# Patient Record
Sex: FEMALE | Race: WHITE | Employment: OTHER | URBAN - METROPOLITAN AREA
[De-identification: names, ages, dates, MRNs, and addresses within clinical notes are randomized per-mention and may not be internally consistent; named-entity substitution may affect disease eponyms.]

---

## 2017-04-04 ENCOUNTER — TELEPHONE (OUTPATIENT)
Dept: DERMATOLOGY | Facility: AMBULATORY SURGERY CENTER | Age: 77
End: 2017-04-04

## 2017-04-04 NOTE — TELEPHONE ENCOUNTER
Patient Appointment Date:   04/13/17  9:30am      Guilherme Gandhi, 78 y.o., female  Is calling for their Mohs Pre-Op Assessment    does not have Hepatitis C or HIV  does confirm site   does ID site. Allergies: Allergies   Allergen Reactions    Norvasc [Amlodipine] Swelling         does not have a Pacemaker  does not have a Defibrillator    does not need antibiotics      is not taking NSAIDs    is taking aspirin (due to stent placement in 06/2012)      is not taking garlic  is not taking ginkgo  is not taking Ginseng  is taking fish oils  is not taking vit E    does not take a blood thinner    is not taking Coumadin/Warfarin         Pre operative assessment questions asked to patient. Patient has a general understanding of the procedure, and has been versed that there will be local anesthesia used in the procedure and that She will be ok to drive themselves to and from the appointment.

## 2017-04-13 ENCOUNTER — OFFICE VISIT (OUTPATIENT)
Dept: DERMATOLOGY | Facility: AMBULATORY SURGERY CENTER | Age: 77
End: 2017-04-13

## 2017-04-13 VITALS
OXYGEN SATURATION: 96 % | DIASTOLIC BLOOD PRESSURE: 71 MMHG | HEART RATE: 58 BPM | TEMPERATURE: 98.7 F | BODY MASS INDEX: 31.89 KG/M2 | RESPIRATION RATE: 18 BRPM | HEIGHT: 63 IN | SYSTOLIC BLOOD PRESSURE: 124 MMHG | WEIGHT: 180 LBS

## 2017-04-13 DIAGNOSIS — C44.219 BASAL CELL CARCINOMA OF HELIX, LEFT: Primary | ICD-10-CM

## 2017-04-13 RX ORDER — LIDOCAINE HYDROCHLORIDE AND EPINEPHRINE 10; 10 MG/ML; UG/ML
3 INJECTION, SOLUTION INFILTRATION; PERINEURAL ONCE
Qty: 1 VIAL | Refills: 0
Start: 2017-04-13 | End: 2017-04-13

## 2017-04-13 RX ORDER — BUPIVACAINE HYDROCHLORIDE AND EPINEPHRINE 2.5; 5 MG/ML; UG/ML
INJECTION, SOLUTION INFILTRATION; PERINEURAL
Qty: 1.5 ML | Refills: 0
Start: 2017-04-13 | End: 2018-01-16 | Stop reason: ALTCHOICE

## 2017-04-13 NOTE — MR AVS SNAPSHOT
Visit Information Date & Time Provider Department Dept. Phone Encounter #  
 4/13/2017  9:30 AM Casey Reynoso MD [place] [id number] [phone] [id number] Your Appointments 6/13/2017  2:30 PM  
ANNUAL with Jesus Manuel Chatterjee MD  
[facility]) Appt Note: per Dr. Ayana Young cp? [address] [facility]  
[phone] [id number] [facility] Upcoming Health Maintenance Date Due DTaP/Tdap/Td series (1 - Tdap) 5/2/1963 FOBT Q 1 YEAR AGE 50-75 5/2/1992 ZOSTER VACCINE AGE 60> 5/2/2002 GLAUCOMA SCREENING Q2Y 5/2/2007 OSTEOPOROSIS SCREENING (DEXA) 5/2/2007 Pneumococcal 65+ Low/Medium Risk (1 of 2 - PCV13) 5/2/2007 MEDICARE YEARLY EXAM 5/2/2007 INFLUENZA AGE 9 TO ADULT 8/1/2016 Allergies as of 4/13/2017  Review Complete On: 4/13/2017 By: Casey Reynoso MD  
  
 Severity Noted Reaction Type Reactions Norvasc [Amlodipine]  12/02/2014    Swelling Current Immunizations  Reviewed on 9/21/2012 Name Date Influenza Vaccine Split 11/21/2011 Not reviewed this visit You Were Diagnosed With   
  
 Codes Comments Basal cell carcinoma of helix, left    -  Primary ICD-10-CM: W39.279 ICD-9-CM: 173.21 Vitals BP Pulse Temp Resp Height(growth percentile) Weight(growth percentile) 124/71 (BP 1 Location: Left arm, BP Patient Position: Sitting) (!) 58 98.7 °F (37.1 °C) (Oral) 18 5' 3\" (1.6 m) 180 lb (81.6 kg) SpO2 BMI OB Status Smoking Status 96% 31.89 kg/m2 Postmenopausal Former Smoker Vitals History BMI and BSA Data Body Mass Index Body Surface Area  
 31.89 kg/m 2 1.9 m 2 Preferred Pharmacy Pharmacy Name Phone [id number], [address] [place] [address] [phone] Your Updated Medication List  
  
   
 This list is accurate as of: 4/13/17  9:48 AM.  Always use your most recent med list.  
  
  
  
  
 aspirin 81 mg chewable tablet Take 1 Tab by mouth daily. b complex vitamins tablet Take 1 Tab by mouth daily. doxazosin 1 mg tablet Commonly known as:  CARDURA TAKE 1 TABLET BY MOUTH EVERY DAY  
  
 FISH OIL 1,000 mg Cap Generic drug:  omega-3 fatty acids-vitamin e Take 1 Cap by mouth two (2) times a day. LIPITOR 20 mg tablet Generic drug:  atorvastatin TAKE 1 TABLET BY MOUTH EVERY DAY  
  
 lisinopril-hydroCHLOROthiazide 20-12.5 mg per tablet Commonly known as:  PRINZIDE, ZESTORETIC  
TAKE 1 TABLET BY MOUTH TWO (2) TIMES A DAY. metoprolol tartrate 25 mg tablet Commonly known as:  LOPRESSOR  
TAKE 1 TABLET BY MOUTH EVERY 12 HOURS  
  
 multivitamin tablet Commonly known as:  ONE A DAY Take 1 Tab by mouth daily. NexIUM 20 mg capsule Generic drug:  esomeprazole Take 22.3 mg by mouth daily. nitroglycerin 0.4 mg SL tablet Commonly known as:  NITROSTAT  
1 tablet by SubLINGual route every five (5) minutes as needed (call 911 if not relieved by 3). VITAMIN D3 2,000 unit Tab Generic drug:  cholecalciferol (vitamin D3) Take  by mouth. ZANTAC 75 75 mg tablet Generic drug:  raNITIdine Take 75 mg by mouth two (2) times a day. * ZOLOFT 25 mg tablet Generic drug:  sertraline Take 25 mg by mouth daily. * ZOLOFT PO Take 50 mg by mouth daily. * Notice: This list has 2 medication(s) that are the same as other medications prescribed for you. Read the directions carefully, and ask your doctor or other care provider to review them with you. Patient Instructions WOUND CARE INSTRUCTIONS 1. Keep the dressing clean and dry and do not remove for 48 hours. 2. Then change the dressing once a day as follows: 
a. Wash hands before and after each dressing change. b. Remove dressing and wash site gently with mild soap and water, rinse, and pat dry. 
c. Apply an ointment (Bacitracin, Polysporin, Neosporin, Petroleum jelly or Aquaphor). d. Apply a non-stick (Telfa) dressing or Band-Aid to cover the wound. Remove pressure bandage, Saturday. You may shower daily at this point, no bandage necessary. Glue will eventually come off within the next 2 weeks. If you still feel rough glue at this point, you may apply vaseline to site daily until fully removed. 3. Watch for: BLEEDING: A small amount of drainage may occur. If bleeding occurs, elevate and rest the surgery site. Apply gauze and steady pressure for 15 minutes. If bleeding continues, call this office. INFECTION: Signs of infection include increased redness, pain, warmth, drainage of pus, and fever. If this occurs, call this office. 4. Special Instructions (follow any that are checked): ·  You have stitches that need to be removed in 0 days ·  Avoid bending at the waist and heavy lifting for two days. ·  Sleep with your head elevated for the next two nights. ·  Rest the surgery site and keep it elevated as much as possible for two days. ·  You may apply an ice-pack for 10-15 minutes every waking hour for the rest of the day. ·  Eat a soft diet and avoid hot food and hot drinks for the rest of the day. ·  Other instructions: Follow up as needed Take Tylenol for pain as needed. Once the site is healed with no remaining bandages or open areas, protect your surgical site and scar from the sun, as this area will be more sensitive. Use a broad spectrum sunscreen SPF 30 or higher daily, and a chemical free product (one containing zinc oxide or titanium dioxide) is a good choice if the area is sensitive. You may begin to gently massage the surgical site in 2-3 weeks, rubbing in a circular motion along the scar.  This can help reduce swelling and thickness of a scar. A scar cream may be used beginnning 1 month after the surgery. If you have any questions or concerns, please call our office Monday through Friday at [phone]. Introducing [facility]! Dear Chula Hays: Thank you for requesting a ENDYMION account. Our records indicate that you already have an active ENDYMION account. You can access your account anytime at [email or web address] [email or web address] Did you know that you can access your hospital and ER discharge instructions at any time in ENDYMION? You can also review all of your test results from your hospital stay or ER visit. Additional Information If you have questions, please visit the Frequently Asked Questions section of the ENDYMION website at [email or web address] Remember, ENDYMION is NOT to be used for urgent needs. For medical emergencies, dial 911. Now available from your iPhone and Android! Please provide this summary of care documentation to your next provider. Your primary care clinician is listed as Tereso Gamble. If you have any questions after today's visit, please call [phone].

## 2017-04-13 NOTE — PROGRESS NOTES
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 Procedure explained with full understanding. Vitals:    04/13/17 0928   BP: 124/71   Pulse: (!) 58   Resp: 18   Temp: 98.7 °F (37.1 °C)   TempSrc: Oral   SpO2: 96%   Weight: 81.6 kg (180 lb)   Height: 5' 3\" (1.6 m)     preoperatively, will continue to monitor. Post-op: Written and verbal post-op wound care instructions given to patient with full understanding of care. Surgical wound bandaged with Vaseline, Telfa, 2x2 gauze, and coverall tape. All questions and concerns addressed. Vitals stable postoperatively.

## 2017-04-13 NOTE — PATIENT INSTRUCTIONS
WOUND CARE INSTRUCTIONS    1. Keep the dressing clean and dry and do not remove for 48 hours. 2. Then change the dressing once a day as follows:  a. Wash hands before and after each dressing change. b. Remove dressing and wash site gently with mild soap and water, rinse, and pat dry.  c. Apply an ointment (Bacitracin, Polysporin, Neosporin, Petroleum jelly or Aquaphor). d. Apply a non-stick (Telfa) dressing or Band-Aid to cover the wound. Remove pressure bandage, Saturday. You may shower daily at this point, no bandage necessary. Glue will eventually come off within the next 2 weeks. If you still feel rough glue at this point, you may apply vaseline to site daily until fully removed. 3. Watch for:  BLEEDING: A small amount of drainage may occur. If bleeding occurs, elevate and rest the surgery site. Apply gauze and steady pressure for 15 minutes. If bleeding continues, call this office. INFECTION: Signs of infection include increased redness, pain, warmth, drainage of pus, and fever. If this occurs, call this office. 4. Special Instructions (follow any that are checked):  · [] You have stitches that need to be removed in 0 days  · [x] Avoid bending at the waist and heavy lifting for two days. · [x] Sleep with your head elevated for the next two nights. · [x] Rest the surgery site and keep it elevated as much as possible for two days. · [x] You may apply an ice-pack for 10-15 minutes every waking hour for the rest of the day. · [] Eat a soft diet and avoid hot food and hot drinks for the rest of the day. · [] Other instructions: Follow up as needed  Take Tylenol for pain as needed. Once the site is healed with no remaining bandages or open areas, protect your surgical site and scar from the sun, as this area will be more sensitive.   Use a broad spectrum sunscreen SPF 30 or higher daily, and a chemical free product (one containing zinc oxide or titanium dioxide) is a good choice if the area is sensitive. You may begin to gently massage the surgical site in 2-3 weeks, rubbing in a circular motion along the scar. This can help reduce swelling and thickness of a scar. A scar cream may be used beginnning 1 month after the surgery. If you have any questions or concerns, please call our office Monday through Friday at [phone].

## 2017-04-13 NOTE — PROGRESS NOTES
This note is written by Ha Polo, as dictated by Miguel A Martinez. Ro Dawn MD.    CC: Basal cell carcinoma on the left superior helical rim    History of present illness:     Bhakti Encarnacion is a 78 y.o. female referred by [facility]. 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 This is a new basal cell carcinoma present for several years described as an asymptomatic, slowly enlarging patch, no prior treatment. 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 She reports tenderness and bleeding while healing from the biopsy. She is feeling well and in her usual state of health today. She has no pain, no current illnesses, no other skin concerns. Her allergies, medications, medical, and social history are reviewed by me today. She reports a family history of skin cancer. Exam:     She is an awake, alert, and oriented 78 y.o. female who appears well and in no distress. There is no preauricular, submandibular, or cervical lymphadenopathy. I examined her face. She has an 8 x 6 mm indistinct pink papule on her left superior helical rim. She confirms location. Assessment/plan:    1. Basal cell carcinoma, left superior helical rim. I discussed the diagnosis of basal cell carcinoma and summarized the pathology report. Mohs surgery is indicated by site, size, and poor definition. The procedure was discussed, verbal and written consent were obtained. I performed the procedure. One stage was required to reach a tumor free plane. The surgical defect was managed with complex repair. There were no complications. She will follow up as needed as the site heals. Indications, risks, and options were discussed with Bhakti Encarnacion preoperatively. Risks including, but not limited to: pain, bleeding, infection, tumor recurrence, scarring and damage to motor and/or sensory nerves, were discussed.  Bhakti Encarnacion chose Mohs surgery. Luisa Estrada was an acceptable surgery candidate. Luisa Estrada was placed in the appropriate position on the operating table in the Mohs surgery procedure room. The area was prepped and draped in the standard manner. Gentian violet was used to outline the clinical margins of the tumor. Local anesthesia was then obtained. The grossly visible tumor was then removed, an underlying layer was excised and mapped according to the Mohs technique, and the individual specimens examined microscopically. The process was repeated until microscopic examination of the tissue specimens confirmed a tumor-free plane. Hemostasis was obtained with electrosurgery and pressure. The wound was covered between stages with moist saline gauze. Wound care instructions (written and verbal) and a follow up appointment were given to Luisa Estrada before discharge. Luisa Estrada was discharged in good condition. The wound management options of second intent healing, layered closure, local flap, and/or full thickness skin graft were discussed. Luisa Estrada understands the aims, risks, alternatives, and possible complications and elects to proceed with a complex layered closure. Wound margins were made vertical, edges undermined in the perichondrial plane, standing cones corrected at both poles followed by layered closure. The wound was closed with buried 6-0 polysorb suture in the muscle and deep subcutis to reduce width of the wound, a second layer in the dermis to reduce tension on the skin edges, and skin edges were approximated with 6-0 fast gut suture. The final closure length was 28 mm. The wound was bandaged with skin glue, Telfa, gauze and Coverroll. Wound care instructions (written and verbal) and a follow up appointment were given to Luisa Estrada before discharge. Lusia Estrada was discharged in good condition. 2. History of skin cancer.  I discussed the diagnosis and recommend routine [facility] for surveillance. The documentation recorded by the scribe accurately reflects the service I personally performed and the decisions made by me. [facility]   OFFICE PROCEDURE PROGRESS NOTE     Chart reviewed for the following:     Hunter Dan MD, have reviewed the History, Physical and updated the Allergic reactions for [address] performed immediately prior to start of procedure:     Hunter Dan MD, have performed the following reviews on Hua Hobbs prior to the start of the procedure:     * Patient was identified by name and date of birth   * Agreement on procedure being performed was verified   * Risks and Benefits explained to the patient   * Procedure site verified and marked as necessary   * Patient was positioned for comfort   * Consent was signed and verified     Time: 9:40 AM  Date of procedure: 4/13/2017  Procedure performed by: Nga Jiang.  Padmini Dan MD   Provider assisted by: LPN   Patient assisted by: self   How tolerated by patient: tolerated the procedure well with no complications   Comments: none

## 2017-05-16 ENCOUNTER — HOSPITAL ENCOUNTER (EMERGENCY)
Age: 77
Discharge: HOME | End: 2017-05-17
Attending: EMERGENCY MEDICINE
Payer: MEDICARE

## 2017-05-16 DIAGNOSIS — R05.9 COUGH: Primary | ICD-10-CM

## 2017-05-16 DIAGNOSIS — J02.9 SORE THROAT: ICD-10-CM

## 2017-05-16 DIAGNOSIS — R68.89 SENSATION OF SWOLLEN THROAT: ICD-10-CM

## 2017-05-16 PROCEDURE — 99283 EMERGENCY DEPT VISIT LOW MDM: CPT

## 2017-05-16 PROCEDURE — 94640 AIRWAY INHALATION TREATMENT: CPT

## 2017-05-16 PROCEDURE — 77030029684 HC NEB SM VOL KT MONA -A

## 2017-05-17 ENCOUNTER — APPOINTMENT (OUTPATIENT)
Dept: GENERAL RADIOLOGY | Age: 77
End: 2017-05-17
Attending: EMERGENCY MEDICINE
Payer: MEDICARE

## 2017-05-17 VITALS
SYSTOLIC BLOOD PRESSURE: 186 MMHG | HEIGHT: 63 IN | BODY MASS INDEX: 32.03 KG/M2 | RESPIRATION RATE: 18 BRPM | TEMPERATURE: 98.7 F | WEIGHT: 180.78 LBS | OXYGEN SATURATION: 96 % | HEART RATE: 92 BPM | DIASTOLIC BLOOD PRESSURE: 95 MMHG

## 2017-05-17 LAB
APPEARANCE UR: ABNORMAL
BACTERIA URNS QL MICRO: NEGATIVE /HPF
BILIRUB UR QL: NEGATIVE
COLOR UR: ABNORMAL
DEPRECATED S PYO AG THROAT QL EIA: NEGATIVE
EPITH CASTS URNS QL MICRO: ABNORMAL /LPF
GLUCOSE UR STRIP.AUTO-MCNC: NEGATIVE MG/DL
HGB UR QL STRIP: NEGATIVE
KETONES UR QL STRIP.AUTO: NEGATIVE MG/DL
LEUKOCYTE ESTERASE UR QL STRIP.AUTO: NEGATIVE
NITRITE UR QL STRIP.AUTO: NEGATIVE
PH UR STRIP: 5.5 [PH] (ref 5–8)
PROT UR STRIP-MCNC: NEGATIVE MG/DL
RBC #/AREA URNS HPF: ABNORMAL /HPF (ref 0–5)
SP GR UR REFRACTOMETRY: 1.02 (ref 1–1.03)
UA: UC IF INDICATED,UAUC: ABNORMAL
UROBILINOGEN UR QL STRIP.AUTO: 0.2 EU/DL (ref 0.2–1)
WBC URNS QL MICRO: ABNORMAL /HPF (ref 0–4)

## 2017-05-17 PROCEDURE — 81001 URINALYSIS AUTO W/SCOPE: CPT | Performed by: EMERGENCY MEDICINE

## 2017-05-17 PROCEDURE — 87880 STREP A ASSAY W/OPTIC: CPT | Performed by: EMERGENCY MEDICINE

## 2017-05-17 PROCEDURE — 70360 X-RAY EXAM OF NECK: CPT

## 2017-05-17 PROCEDURE — 74011000250 HC RX REV CODE- 250: Performed by: EMERGENCY MEDICINE

## 2017-05-17 PROCEDURE — 87070 CULTURE OTHR SPECIMN AEROBIC: CPT | Performed by: EMERGENCY MEDICINE

## 2017-05-17 PROCEDURE — 74011250637 HC RX REV CODE- 250/637: Performed by: EMERGENCY MEDICINE

## 2017-05-17 RX ORDER — PREDNISONE 10 MG/1
TABLET ORAL
Qty: 48 TAB | Refills: 0 | Status: SHIPPED | OUTPATIENT
Start: 2017-05-17 | End: 2017-11-07 | Stop reason: ALTCHOICE

## 2017-05-17 RX ORDER — IPRATROPIUM BROMIDE AND ALBUTEROL SULFATE 2.5; .5 MG/3ML; MG/3ML
SOLUTION RESPIRATORY (INHALATION)
Status: DISCONTINUED
Start: 2017-05-17 | End: 2017-05-17 | Stop reason: HOSPADM

## 2017-05-17 RX ORDER — IPRATROPIUM BROMIDE AND ALBUTEROL SULFATE 2.5; .5 MG/3ML; MG/3ML
3 SOLUTION RESPIRATORY (INHALATION)
Status: COMPLETED | OUTPATIENT
Start: 2017-05-17 | End: 2017-05-17

## 2017-05-17 RX ORDER — CODEINE PHOSPHATE AND GUAIFENESIN 10; 100 MG/5ML; MG/5ML
5 SOLUTION ORAL
Qty: 118 ML | Refills: 0 | Status: SHIPPED | OUTPATIENT
Start: 2017-05-17 | End: 2018-07-19 | Stop reason: ALTCHOICE

## 2017-05-17 RX ORDER — DEXAMETHASONE SODIUM PHOSPHATE 4 MG/ML
10 INJECTION, SOLUTION INTRA-ARTICULAR; INTRALESIONAL; INTRAMUSCULAR; INTRAVENOUS; SOFT TISSUE
Status: COMPLETED | OUTPATIENT
Start: 2017-05-17 | End: 2017-05-17

## 2017-05-17 RX ORDER — LIDOCAINE HYDROCHLORIDE 20 MG/ML
15 SOLUTION OROPHARYNGEAL
Status: COMPLETED | OUTPATIENT
Start: 2017-05-17 | End: 2017-05-17

## 2017-05-17 RX ADMIN — DEXAMETHASONE SODIUM PHOSPHATE 10 MG: 4 INJECTION, SOLUTION INTRAMUSCULAR; INTRAVENOUS at 00:24

## 2017-05-17 RX ADMIN — IPRATROPIUM BROMIDE AND ALBUTEROL SULFATE 3 ML: .5; 3 SOLUTION RESPIRATORY (INHALATION) at 02:44

## 2017-05-17 RX ADMIN — ALUMINUM HYDROXIDE AND MAGNESIUM HYDROXIDE 30 ML: 200; 200 SUSPENSION ORAL at 00:24

## 2017-05-17 RX ADMIN — LIDOCAINE HYDROCHLORIDE 15 ML: 20 SOLUTION ORAL; TOPICAL at 00:24

## 2017-05-17 NOTE — ED NOTES
Provider at bedside for dispo and follow up. Discharge plan reviewed and paperwork given, teaching demonstrated and read back, pain level within manageable comfortable limits, ambulatory to exit, gait steady, safety maintained.

## 2017-05-17 NOTE — DISCHARGE INSTRUCTIONS
Cough: Care Instructions  Your Care Instructions  A cough is your body's response to something that bothers your throat or airways. Many things can cause a cough. You might cough because of a cold or the flu, bronchitis, or asthma. Smoking, postnasal drip, allergies, and stomach acid that backs up into your throat also can cause coughs. A cough is a symptom, not a disease. Most coughs stop when the cause, such as a cold, goes away. You can take a few steps at home to cough less and feel better. Follow-up care is a key part of your treatment and safety. Be sure to make and go to all appointments, and call your doctor if you are having problems. It's also a good idea to know your test results and keep a list of the medicines you take. How can you care for yourself at home? · Drink lots of water and other fluids. This helps thin the mucus and soothes a dry or sore throat. Honey or lemon juice in hot water or tea may ease a dry cough. · Take cough medicine as directed by your doctor. · Prop up your head on pillows to help you breathe and ease a dry cough. · Try cough drops to soothe a dry or sore throat. Cough drops don't stop a cough. Medicine-flavored cough drops are no better than candy-flavored drops or hard candy. · Do not smoke. Avoid secondhand smoke. If you need help quitting, talk to your doctor about stop-smoking programs and medicines. These can increase your chances of quitting for good. When should you call for help? Call 911 anytime you think you may need emergency care. For example, call if:  · You have severe trouble breathing. Call your doctor now or seek immediate medical care if:  · You cough up blood. · You have new or worse trouble breathing. · You have a new or higher fever. · You have a new rash.   Watch closely for changes in your health, and be sure to contact your doctor if:  · You cough more deeply or more often, especially if you notice more mucus or a change in the color of your mucus. · You have new symptoms, such as a sore throat, an earache, or sinus pain. · You do not get better as expected. Where can you learn more? Go to http://anita-keaton.info/. Enter D279 in the search box to learn more about \"Cough: Care Instructions. \"  Current as of: May 27, 2016  Content Version: 11.2  © 2006-2017 [employer]. Care instructions adapted under license by [employer] (which disclaims liability or warranty for this information). If you have questions about a medical condition or this instruction, always ask your healthcare professional. [place] [address] any warranty or liability for your use of this information. Sore Throat: Care Instructions  Your Care Instructions    Infection by bacteria or a virus causes most sore throats. Cigarette smoke, dry air, air pollution, allergies, and yelling can also cause a sore throat. Sore throats can be painful and annoying. Fortunately, most sore throats go away on their own. If you have a bacterial infection, your doctor may prescribe antibiotics. Follow-up care is a key part of your treatment and safety. Be sure to make and go to all appointments, and call your doctor if you are having problems. It's also a good idea to know your test results and keep a list of the medicines you take. How can you care for yourself at home? · If your doctor prescribed antibiotics, take them as directed. Do not stop taking them just because you feel better. You need to take the full course of antibiotics. · Gargle with warm salt water once an hour to help reduce swelling and relieve discomfort. Use 1 teaspoon of salt mixed in 1 cup of warm water. · Take an over-the-counter pain medicine, such as acetaminophen (Tylenol), ibuprofen (Advil, Motrin), or naproxen (Aleve). Read and follow all instructions on the label.   · Be careful when taking over-the-counter cold or flu medicines and Tylenol at the same time. Many of these medicines have acetaminophen, which is Tylenol. Read the labels to make sure that you are not taking more than the recommended dose. Too much acetaminophen (Tylenol) can be harmful. · Drink plenty of fluids. Fluids may help soothe an irritated throat. Hot fluids, such as tea or soup, may help decrease throat pain. · Use over-the-counter throat lozenges to soothe pain. Regular cough drops or hard candy may also help. These should not be given to young children because of the risk of choking. · Do not smoke or allow others to smoke around you. If you need help quitting, talk to your doctor about stop-smoking programs and medicines. These can increase your chances of quitting for good. · Use a vaporizer or humidifier to add moisture to your bedroom. Follow the directions for cleaning the machine. When should you call for help? Call your doctor now or seek immediate medical care if:  · You have new or worse trouble swallowing. · Your sore throat gets much worse on one side. Watch closely for changes in your health, and be sure to contact your doctor if you do not get better as expected. Where can you learn more? Go to http://anita-keaton.info/. Enter 062 441 80 [id number] in the search box to learn more about \"Sore Throat: Care Instructions. \"  Current as of: July 29, 2016  Content Version: 11.2  © 1415-7233 [employer]. Care instructions adapted under license by [employer] (which disclaims liability or warranty for this information). If you have questions about a medical condition or this instruction, always ask your healthcare professional. [place] [address] any warranty or liability for your use of this information. Rapid Strep Test: About This Test  What is it? A rapid strep test checks the bacteria in your throat to see if strep is the cause of your sore throat. Why is this test done?   It may be done so your doctor can find out right away whether you have strep throat. There is another test for strep, called a throat culture, but that test takes a few days to get the results. How can you prepare for the test?  You don't need to do anything before you have this test.  What happens during the test?  · You will be asked to tilt your head back and open your mouth as wide as possible. · Your doctor will press your tongue down with a flat stick (tongue depressor) and then examine your mouth and throat. · A clean cotton swab will be rubbed over the back of your throat, around your tonsils, and over any red areas or sores to collect a sample. How long does the test take? · The test takes less than a minute. · Results are available in 10 to 15 minutes. When should you call for help? Call your doctor now or seek immediate medical care if:  · Your pain gets worse on one side of your throat, or you have trouble opening your mouth. · You have a new or higher fever, or you have a fever with a stiff neck or severe headache. · Swallowing becomes harder, or you have any trouble breathing. · You are sensitive to light or feel very sleepy or confused. Watch closely for changes in your health, and be sure to contact your doctor if:  · You do not start to feel better after 2 days. Follow-up care is a key part of your treatment and safety. Be sure to make and go to all appointments, and call your doctor if you are having problems. It's also a good idea to keep a list of the medicines you take. Ask your doctor when you can expect to have your test results. Where can you learn more? Go to http://anita-keaton.info/. Enter B356 in the search box to learn more about \"Rapid Strep Test: About This Test.\"  Current as of: July 29, 2016  Content Version: 11.2  © 7228-5690 [employer]. Care instructions adapted under license by [employer] (which disclaims liability or warranty for this information).  If you have questions about a medical condition or this instruction, always ask your healthcare professional. [place] [address] any warranty or liability for your use of this information.

## 2017-05-17 NOTE — ED PROVIDER NOTES
HPI Comments: Johs Jones is a 78 y.o. female with PMhx significant for HTN, depression, hypercholesteremia, CAD, and skin CA who presents ambulatory to the ED with cc of a progressively worsening sore throat since 5/15/17. Pt states that since yesterday (5/16) she has also been experiencing progressively worsening productive cough, noting she has coughing fits where she feels short of breath. She denies any hx of similar symptoms. She reports a hx of CAD (s/p stent placement), noting she has a follow up with Dr. Jeff Steinberg (cardiology) next month. She reports daily use of ASA 81 mg. She denies any fevers, nausea, or vomiting. Social Hx: - Tobacco, - EtOH, - Illicit Drugs  PMHx significant for: HTN, depression, hypercholesteremia, CAD, skin CA  Family PMhx is significant for: Skin CA  PSHx significant for: Cardiac cath, stent placement  Cardiology: Dr. Jeff Steinberg  PCP: Michaela Goode MD    There are no other complaints, changes or physical findings at this time. The history is provided by the patient. No  was used. Past Medical History:   Diagnosis Date    CAD (coronary artery disease)     Family history of skin cancer     Hypercholesteremia     Hypertension     Psychiatric disorder     depression    Skin cancer        Past Surgical History:   Procedure Laterality Date    AMB POC MOHS 1 STAGE H/N/HF/G      CARDIAC CATHETERIZATION  5/19/2012         DRUG ELUTING STENT SINGLE VESS  5/19/2012     Dr Alejandra Yuan MOHS PROCEDURES  04/13/2017    BCC left superior helical rim by Dr. Kari Garcia         Family History:   Problem Relation Age of Onset    Coronary Artery Disease Other      daughter stent age 28       Social History     Social History    Marital status:      Spouse name: N/A    Number of children: N/A    Years of education: N/A     Occupational History    Not on file.      Social History Main Topics    Smoking status: Former Smoker     Quit date: 5/25/2012    Smokeless tobacco: Current User    Alcohol use Yes      Comment: Socially.  Drug use: No    Sexual activity: Not on file     Other Topics Concern    Not on file     Social History Narrative         ALLERGIES: Norvasc [amlodipine]    Review of Systems   Constitutional: Negative. Negative for fever. HENT: Positive for sore throat. Eyes: Negative. Respiratory: Positive for cough and shortness of breath. Cardiovascular: Negative for chest pain. Gastrointestinal: Negative for abdominal pain, nausea and vomiting. Endocrine: Negative. Genitourinary: Negative. Negative for difficulty urinating, dysuria and hematuria. Musculoskeletal: Negative. Skin: Negative. Allergic/Immunologic: Negative. Neurological: Negative. Psychiatric/Behavioral: Negative for suicidal ideas. All other systems reviewed and are negative. Vitals:    05/16/17 2355   BP: (!) 178/93   Pulse: 96   Resp: 18   Temp: 98.7 °F (37.1 °C)   SpO2: 95%   Weight: 82 kg (180 lb 12.4 oz)   Height: 5' 3\" (1.6 m)            Physical Exam   Constitutional: She is oriented to person, place, and time. She appears well-developed and well-nourished. No distress. HENT:   Head: Normocephalic and atraumatic. Nose: Nose normal.   Mouth/Throat: Uvula is midline, oropharynx is clear and moist and mucous membranes are normal. No oral lesions. No trismus in the jaw. No uvula swelling. Eyes: Conjunctivae and EOM are normal. No scleral icterus. Neck: Normal range of motion. No tracheal deviation present. Cardiovascular: Normal rate, regular rhythm, normal heart sounds and intact distal pulses. Exam reveals no friction rub. No murmur heard. Pulmonary/Chest: Effort normal and breath sounds normal. No stridor. No respiratory distress. She has no wheezes. She has no rales. Abdominal: Soft. Bowel sounds are normal. She exhibits no distension. There is no tenderness. There is no rebound.    Musculoskeletal: Normal range of motion. She exhibits no tenderness. Neurological: She is alert and oriented to person, place, and time. No cranial nerve deficit. Skin: Skin is warm and dry. No rash noted. She is not diaphoretic. Psychiatric: She has a normal mood and affect. Her speech is normal and behavior is normal. Judgment and thought content normal. Cognition and memory are normal.   Nursing note and vitals reviewed. MDM  Number of Diagnoses or Management Options  Cough:   Sensation of swollen throat:   Sore throat:   Diagnosis management comments: DDX:  Pharyngitis, uti, esophagitis    Plan:  Strep swab, cxr, lidocaine, decadron, ua    Impression:  Uri, cough, sensation of throat swelling       Amount and/or Complexity of Data Reviewed  Clinical lab tests: reviewed and ordered  Tests in the radiology section of CPT®: ordered and reviewed  Review and summarize past medical records: yes    Patient Progress  Patient progress: stable    ED Course       Procedures    I reviewed our electronic medical record system for any past medical records that were available that may contribute to the patients current condition, the nursing notes and and vital signs from today's visit    Nursing notes will be reviewed as they become available in realtime while the pt has been in the ED. Kortney Benitez MD    I personally reviewed pt's imaging. Official read by radiology listed below. Kortney Benitez MD    3:36 AM  Progress note:  Pt noted to be feeling better at this time, Feels ready for discharge. She states that she feels like her breathing has improved and her voice is coming back, although she notes her throat is still sore. She states she would like to be discharged at this time. Discussed lab and imaging findings with pt. Will follow up as instructed. All questions have been answered, pt voiced understanding and agreement with plan.   Specific return precautions provided as well as instructions to return to the ED should sx worsen at any time. Linda Harrington MD    LABORATORY TESTS:  Recent Results (from the past 12 hour(s))   STREP AG SCREEN, GROUP A    Collection Time: 05/17/17 12:12 AM   Result Value Ref Range    Group A Strep Ag ID NEGATIVE  NEG     URINALYSIS W/ REFLEX CULTURE    Collection Time: 05/17/17  2:00 AM   Result Value Ref Range    Color YELLOW/STRAW      Appearance CLOUDY (A) CLEAR      Specific gravity 1.022 1.003 - 1.030      pH (UA) 5.5 5.0 - 8.0      Protein NEGATIVE  NEG mg/dL    Glucose NEGATIVE  NEG mg/dL    Ketone NEGATIVE  NEG mg/dL    Bilirubin NEGATIVE  NEG      Blood NEGATIVE  NEG      Urobilinogen 0.2 0.2 - 1.0 EU/dL    Nitrites NEGATIVE  NEG      Leukocyte Esterase NEGATIVE  NEG      WBC 0-4 0 - 4 /hpf    RBC 0-5 0 - 5 /hpf    Epithelial cells MODERATE (A) FEW /lpf    Bacteria NEGATIVE  NEG /hpf    UA:UC IF INDICATED CULTURE NOT INDICATED BY UA RESULT CNI         IMAGING RESULTS:  XR NECK SOFT TISSUE   Final Result   EXAM: Soft tissue neck series.     CLINICAL INDICATION: Pain     COMPARISON: None.     TECHNIQUE: AP and lateral views of the neck are performed with soft tissue  technique.      FINDINGS: There is normal appearance of the aerodigestive tract with no  abnormality of the hypopharynx, larynx, epiglottis, aryepiglottic folds,  valleculae, or partial trachea evident. There is no prevertebral soft tissue  swelling. No radiographically evident foreign body. Osseous structures and  visualized lung apices appear grossly unremarkable. Atherosclerotic  calcifications of the carotid bifurcations and aortic arch are noted.     IMPRESSION  IMPRESSION: No acute findings. Taty Bourgeois        MEDICATIONS GIVEN:  Medications   lidocaine (XYLOCAINE) 2 % viscous solution 15 mL (15 mL Mouth/Throat Given 5/17/17 0024)   aluminum-magnesium hydroxide (MAALOX) oral suspension 30 mL (30 mL Oral Given 5/17/17 0024)   dexamethasone (DECADRON) 4 mg/mL injection 10 mg (10 mg Oral Given 5/17/17 0024)   albuterol-ipratropium (Dyke Karma) 2.5 MG-0.5 MG/3 ML (3 mL Nebulization Given 5/17/17 0244)       IMPRESSION:  1. Cough    2. Sore throat    3. Sensation of swollen throat        PLAN:  1. Current Discharge Medication List      START taking these medications    Details   guaiFENesin-codeine (CHERATUSSIN AC) 100-10 mg/5 mL solution Take 5 mL by mouth three (3) times daily as needed for Cough. Max Daily Amount: 15 mL. Qty: 118 mL, Refills: 0      predniSONE (STERAPRED DS) 10 mg dose pack Take as directed on packaging  Qty: 48 Tab, Refills: 0           2. Follow-up Information     Follow up With Details Comments [address] Zaid Baumann MD Schedule an appointment as soon as possible for a visit in 2 days  [address] [place] [address]  [phone]          Return to ED if worse     Discharge Note:  3:39 AM  The patient has been re-evaluated and is ready for discharge. Reviewed available results with patient. Counseled patient on diagnosis and care plan. Patient has expressed understanding, and all questions have been answered. Patient agrees with plan and agrees to follow up as recommended, or return to the ED if their symptoms worsen. Discharge instructions have been provided and explained to the patient, along with reasons to return to the ED. Attestation: This note is prepared by James Anderson, acting as Scribe for Josef Woods MD.    Josef Woods MD: The scribe's documentation has been prepared under my direction and personally reviewed by me in its entirety. I confirm that the note above accurately reflects all work, treatment, procedures, and medical decision making performed by me. Patsy Hummel

## 2017-05-19 LAB
BACTERIA SPEC CULT: NORMAL
SERVICE CMNT-IMP: NORMAL

## 2017-07-24 ENCOUNTER — OFFICE VISIT (OUTPATIENT)
Dept: CARDIOLOGY CLINIC | Age: 77
End: 2017-07-24

## 2017-07-24 VITALS
DIASTOLIC BLOOD PRESSURE: 78 MMHG | HEART RATE: 88 BPM | HEIGHT: 63 IN | RESPIRATION RATE: 18 BRPM | BODY MASS INDEX: 31.45 KG/M2 | WEIGHT: 177.5 LBS | SYSTOLIC BLOOD PRESSURE: 122 MMHG | OXYGEN SATURATION: 9 %

## 2017-07-24 DIAGNOSIS — I10 BENIGN ESSENTIAL HYPERTENSION: ICD-10-CM

## 2017-07-24 DIAGNOSIS — E78.2 MIXED HYPERLIPIDEMIA: ICD-10-CM

## 2017-07-24 DIAGNOSIS — I25.10 CORONARY ARTERY DISEASE INVOLVING NATIVE CORONARY ARTERY OF NATIVE HEART WITHOUT ANGINA PECTORIS: Primary | ICD-10-CM

## 2017-07-24 DIAGNOSIS — Z98.61 S/P PTCA (PERCUTANEOUS TRANSLUMINAL CORONARY ANGIOPLASTY): ICD-10-CM

## 2017-07-24 DIAGNOSIS — I44.7 LEFT BUNDLE-BRANCH BLOCK: ICD-10-CM

## 2017-07-24 RX ORDER — LANOLIN ALCOHOL/MO/W.PET/CERES
1000 CREAM (GRAM) TOPICAL DAILY
COMMUNITY

## 2017-07-24 NOTE — MR AVS SNAPSHOT
Visit Information Date & Time Provider Department Dept. Phone Encounter #  
 7/24/2017  1:45 PM Jin Elliott, [address] [place] [address] [facility] [phone] [id number] Upcoming Health Maintenance Date Due DTaP/Tdap/Td series (1 - Tdap) 5/2/1963 ZOSTER VACCINE AGE 60> 3/2/2002 GLAUCOMA SCREENING Q2Y 5/2/2007 OSTEOPOROSIS SCREENING (DEXA) 5/2/2007 Pneumococcal 65+ Low/Medium Risk (1 of 2 - PCV13) 5/2/2007 MEDICARE YEARLY EXAM 5/2/2007 INFLUENZA AGE 9 TO ADULT 8/1/2017 Allergies as of 7/24/2017  Review Complete On: 7/24/2017 By: Jin Elliott MD  
  
 Severity Noted Reaction Type Reactions Norvasc [Amlodipine]  12/02/2014    Swelling Current Immunizations  Reviewed on 9/21/2012 Name Date Influenza Vaccine Split 11/21/2011 Not reviewed this visit You Were Diagnosed With   
  
 Codes Comments Coronary artery disease involving native coronary artery of native heart without angina pectoris    -  Primary ICD-10-CM: I25.10 ICD-9-CM: 414.01 Benign essential hypertension     ICD-10-CM: I10 
ICD-9-CM: 401.1 Left bundle-branch block     ICD-10-CM: I44.7 ICD-9-CM: 426.3 S/P PTCA (percutaneous transluminal coronary angioplasty)     ICD-10-CM: Z98.61 ICD-9-CM: V45.82 Mixed hyperlipidemia     ICD-10-CM: E78.2 ICD-9-CM: 272.2 Vitals BP Pulse Resp Height(growth percentile) Weight(growth percentile) SpO2  
 122/78 (BP 1 Location: Right arm, BP Patient Position: Sitting) 88 18 5' 3\" (1.6 m) 177 lb 8 oz (80.5 kg) (!) 9% BMI OB Status Smoking Status 31.44 kg/m2 Postmenopausal Former Smoker Vitals History BMI and BSA Data Body Mass Index Body Surface Area  
 31.44 kg/m 2 1.89 m 2 Preferred Pharmacy Pharmacy Name Phone [id number], [address] [facility] [phone] Your Updated Medication List  
  
   
 This list is accurate as of: 7/24/17  2:16 PM.  Always use your most recent med list.  
  
  
  
  
 aspirin 81 mg chewable tablet Take 1 Tab by mouth daily. b complex vitamins tablet Take 1 Tab by mouth daily. bupivacaine-EPINEPHrine 0.25 %-1:200,000 Soln Commonly known as:  Benz Savory Used for mohs surgery  
  
 cyanocobalamin 1,000 mcg tablet Take 1,000 mcg by mouth daily. doxazosin 1 mg tablet Commonly known as:  CARDURA TAKE 1 TABLET BY MOUTH EVERY DAY  
  
 FISH OIL 1,000 mg Cap Generic drug:  omega-3 fatty acids-vitamin e Take 1 Cap by mouth two (2) times a day. guaiFENesin-codeine 100-10 mg/5 mL solution Commonly known as:  Vernel Chevy Take 5 mL by mouth three (3) times daily as needed for Cough. Max Daily Amount: 15 mL. LIPITOR 20 mg tablet Generic drug:  atorvastatin TAKE 1 TABLET BY MOUTH EVERY DAY  
  
 lisinopril-hydroCHLOROthiazide 20-12.5 mg per tablet Commonly known as:  PRINZIDE, ZESTORETIC  
TAKE 1 TABLET BY MOUTH TWO (2) TIMES A DAY. metoprolol tartrate 25 mg tablet Commonly known as:  LOPRESSOR  
TAKE 1 TABLET BY MOUTH EVERY 12 HOURS  
  
 multivitamin tablet Commonly known as:  ONE A DAY Take 1 Tab by mouth daily. NexIUM 20 mg capsule Generic drug:  esomeprazole Take 22.3 mg by mouth daily. nitroglycerin 0.4 mg SL tablet Commonly known as:  NITROSTAT  
1 tablet by SubLINGual route every five (5) minutes as needed (call 911 if not relieved by 3). predniSONE 10 mg dose pack Commonly known as:  STERAPRED DS Take as directed on packaging VITAMIN D3 2,000 unit Tab Generic drug:  cholecalciferol (vitamin D3) Take  by mouth. ZANTAC 75 75 mg tablet Generic drug:  raNITIdine Take 75 mg by mouth two (2) times a day. * ZOLOFT 25 mg tablet Generic drug:  sertraline Take 25 mg by mouth daily. * ZOLOFT PO Take 50 mg by mouth daily. * Notice: This list has 2 medication(s) that are the same as other medications prescribed for you. Read the directions carefully, and ask your doctor or other care provider to review them with you. We Performed the Following AMB POC EKG ROUTINE W/ 12 LEADS, INTER & REP [70818 CPT(R)] Introducing [facility] SERVICES! Dear Manson Gitelman: Thank you for requesting a [employer] account. Our records indicate that you already have an active [employer] account. You can access your account anytime at [email or web address] Did you know that you can access your hospital and ER discharge instructions at any time in [employer]? You can also review all of your test results from your hospital stay or ER visit. Additional Information If you have questions, please visit the Frequently Asked Questions section of the [employer] website at [email or web address] Remember, [employer] is NOT to be used for urgent needs. For medical emergencies, dial 911. Now available from your iPhone and Android! Please provide this summary of care documentation to your next provider. Your primary care clinician is listed as Patel Augustine. If you have any questions after today's visit, please call [phone].

## 2017-07-24 NOTE — PROGRESS NOTES
[facility], [address] [facility]  [phone]     Subjective:      Janene Andres is a 78 y.o. female is here for routine f/u. The patient denies chest pain/ shortness of breath, orthopnea, PND, LE edema, palpitations, syncope, or presyncope. Patient Active Problem List    Diagnosis Date Noted    Benign essential hypertension 05/19/2012    Hyperlipidemia 05/19/2012    Depression 05/19/2012    Unstable angina pectoris ([address] 05/19/2012    Left bundle-branch block 05/19/2012    CAD (coronary artery disease) 05/19/2012    S/P PTCA (percutaneous transluminal coronary angioplasty) 05/19/2012      Michelle Lopez MD  Past Medical History:   Diagnosis Date    CAD (coronary artery disease)     Family history of skin cancer     Hypercholesteremia     Hypertension     Psychiatric disorder     depression    Skin cancer       Past Surgical History:   Procedure Laterality Date    AMB POC MOHS 1 STAGE H/N/HF/G      CARDIAC CATHETERIZATION  5/19/2012         DRUG ELUTING STENT SINGLE VESS  5/19/2012     [facility]    BCC left superior helical rim by Dr. Kayla Brennan [Amlodipine] Swelling      Family History   Problem Relation Age of Onset    Coronary Artery Disease Other      daughter stent age 28      Social History     Social History    Marital status:      Spouse name: N/A    Number of children: N/A    Years of education: N/A     Occupational History    Not on file.      Social History Main Topics    Smoking status: Former Smoker     Packs/day: 0.50     Years: 50.00     Quit date: 5/25/2012    Smokeless tobacco: Never Used    Alcohol use Yes      Comment: occasional    Drug use: No    Sexual activity: Not on file     Other Topics Concern    Not on file     Social History Narrative      Current Outpatient Prescriptions   Medication Sig    cyanocobalamin 1,000 mcg tablet Take 1,000 mcg by mouth daily.  LIPITOR 20 mg tablet TAKE 1 TABLET BY MOUTH EVERY DAY    lisinopril-hydroCHLOROthiazide (PRINZIDE, ZESTORETIC) 20-12.5 mg per tablet TAKE 1 TABLET BY MOUTH TWO (2) TIMES A DAY.  doxazosin (CARDURA) 1 mg tablet TAKE 1 TABLET BY MOUTH EVERY DAY    metoprolol tartrate (LOPRESSOR) 25 mg tablet TAKE 1 TABLET BY MOUTH EVERY 12 HOURS    ZOLOFT 25 mg tablet Take 25 mg by mouth daily.  nitroglycerin (NITROSTAT) 0.4 mg SL tablet 1 tablet by SubLINGual route every five (5) minutes as needed (call 911 if not relieved by 3).  omega-3 fatty acids-vitamin e (FISH OIL) 1,000 mg Cap Take 1 Cap by mouth two (2) times a day.  cholecalciferol, vitamin D3, (VITAMIN D3) 2,000 unit Tab Take  by mouth.  aspirin 81 mg chewable tablet Take 1 Tab by mouth daily.  guaiFENesin-codeine (CHERATUSSIN AC) 100-10 mg/5 mL solution Take 5 mL by mouth three (3) times daily as needed for Cough. Max Daily Amount: 15 mL.  predniSONE (STERAPRED DS) 10 mg dose pack Take as directed on packaging    bupivacaine-EPINEPHrine (MARCAINE-EPINEPHRINE) 0.25 %-1:200,000 soln Used for mohs surgery    ranitidine (ZANTAC 75) 75 mg tablet Take 75 mg by mouth two (2) times a day.  b complex vitamins tablet Take 1 Tab by mouth daily.  esomeprazole (NEXIUM) 20 mg capsule Take 22.3 mg by mouth daily.  multivitamin (ONE A DAY) tablet Take 1 Tab by mouth daily.  SERTRALINE HCL (ZOLOFT PO) Take 50 mg by mouth daily. No current facility-administered medications for this visit. Review of Symptoms:  11 systems reviewed, negative other than as stated in the HPI    Physical ExamPhysical Exam:    Vitals:    07/24/17 1331 07/24/17 1342   BP: 122/80 122/78   Pulse: 88    Resp: 18    SpO2: (!) 9%    Weight: 177 lb 8 oz (80.5 kg)    Height: 5' 3\" (1.6 m)      Body mass index is 31.44 kg/(m^2). General PE   Gen:  NAD  Mental Status - Alert. General Appearance - Not in acute distress.    Chest and Lung Exam   Inspection: Accessory muscles - No use of accessory muscles in breathing. Auscultation:   Breath sounds: - Normal.   Cardiovascular   Inspection: Jugular vein - Bilateral - Inspection Normal.   Palpation/Percussion:   Apical Impulse: - Normal.   Auscultation: Rhythm - Regular. Heart Sounds - S1 WNL and S2 WNL. No S3 or S4. Murmurs & Other Heart Sounds: Auscultation of the heart reveals - No Murmurs. Peripheral Vascular   Upper Extremity: Inspection - Bilateral - No Cyanotic nailbeds or Digital clubbing. Lower Extremity:   Palpation: Edema - Bilateral - No edema. Abdomen:   Soft, non-tender, bowel sounds are active.   Neuro: A&O times 3, CN and motor grossly WNL    Labs:   Lab Results   Component Value Date/Time    Cholesterol, total 161 10/16/2014 10:38 AM    Cholesterol, total 168 09/17/2013 12:00 AM    Cholesterol, total 172 01/17/2013 10:41 AM    Cholesterol, total 164 09/05/2012 10:30 AM    Cholesterol, total 131 05/20/2012 03:50 AM    HDL Cholesterol 85 10/16/2014 10:38 AM    HDL Cholesterol 82 09/17/2013 12:00 AM    HDL Cholesterol 84 01/17/2013 10:41 AM    HDL Cholesterol 65 09/05/2012 10:30 AM    HDL Cholesterol 48 05/20/2012 03:50 AM    LDL, calculated 65 10/16/2014 10:38 AM    LDL, calculated 70 09/17/2013 12:00 AM    LDL, calculated 66 01/17/2013 10:41 AM    LDL, calculated 66 09/05/2012 10:30 AM    LDL, calculated 46.8 05/20/2012 03:50 AM    Triglyceride 56 10/16/2014 10:38 AM    Triglyceride 79 09/17/2013 12:00 AM    Triglyceride 111 01/17/2013 10:41 AM    Triglyceride 167 09/05/2012 10:30 AM    Triglyceride 181 05/20/2012 03:50 AM    CHOL/HDL Ratio 2.7 05/20/2012 03:50 AM     Lab Results   Component Value Date/Time    CK 77 04/18/2013 02:41 AM     Lab Results   Component Value Date/Time    Sodium 139 01/02/2015 09:00 AM    Potassium 4.0 01/02/2015 09:00 AM    Chloride 96 01/02/2015 09:00 AM    CO2 26 01/02/2015 09:00 AM    Anion gap 9 09/12/2014 01:25 PM    Glucose 96 01/02/2015 09:00 AM BUN 18 01/02/2015 09:00 AM    Creatinine 0.85 01/02/2015 09:00 AM    BUN/Creatinine ratio 21 01/02/2015 09:00 AM    GFR est AA 79 01/02/2015 09:00 AM    GFR est non-AA 69 01/02/2015 09:00 AM    Calcium 9.4 01/02/2015 09:00 AM    Bilirubin, total 0.5 10/16/2014 10:38 AM    AST (SGOT) 24 10/16/2014 10:38 AM    Alk. phosphatase 99 10/16/2014 10:38 AM    Protein, total 6.5 10/16/2014 10:38 AM    Albumin 4.4 10/16/2014 10:38 AM    Globulin 3.7 09/12/2014 01:25 PM    A-G Ratio 2.1 10/16/2014 10:38 AM    ALT (SGPT) 11 10/16/2014 10:38 AM       EKG:  NSR      Assessment       1. Coronary artery disease involving native coronary artery of native heart without angina pectoris    2. Benign essential hypertension    3. Left bundle-branch block    4. S/P PTCA (percutaneous transluminal coronary angioplasty)    5. Mixed hyperlipidemia        Orders Placed This Encounter    AMB POC EKG ROUTINE W/ 12 LEADS, INTER & REP     Order Specific Question:   Reason for Exam:     Answer:   routine    cyanocobalamin 1,000 mcg tablet     Sig: Take 1,000 mcg by mouth daily. Plan:     Doing well with no cardiac symptoms. CAD:  Stable. Promus element CLEMENTE to the proximal LAD May 19, 2012. Negative stress test in 2015. Continue aspirin, beta-blocker, and statin. Lipids and labs followed by PCP, scanned and at goal 12/2016. HTN controlled    Preventive:  Counseled on diet and exercise- eventual goal of 30-60 minutes 5-7 times a week as per AHA guidelines. She previously goes out every day and does something in a climate-controlled environment such as walking around in a store for a long period of time. Encourage more of the same. Continue current care and f/u in 12 months.     Stacia Jones MD

## 2017-07-31 RX ORDER — DOXAZOSIN 1 MG/1
TABLET ORAL
Qty: 90 TAB | Refills: 2 | Status: SHIPPED | OUTPATIENT
Start: 2017-07-31 | End: 2018-05-01 | Stop reason: SDUPTHER

## 2017-09-12 RX ORDER — METOPROLOL TARTRATE 25 MG/1
TABLET, FILM COATED ORAL
Qty: 180 TAB | Refills: 2 | Status: SHIPPED | OUTPATIENT
Start: 2017-09-12 | End: 2017-10-17 | Stop reason: SDUPTHER

## 2017-10-04 ENCOUNTER — TELEPHONE (OUTPATIENT)
Dept: CARDIOLOGY CLINIC | Age: 77
End: 2017-10-04

## 2017-10-04 NOTE — TELEPHONE ENCOUNTER
Please call patient regarding symptoms, irregular heart rate and high blood pressure, and short winded. .. She keeps feeling like she wants to take a deep breathe. Thanks.

## 2017-10-04 NOTE — TELEPHONE ENCOUNTER
If palpitations are occurring every day, she would benefit from a 24-hour Holter monitor. If more severe palpitations occurring greater than 48 hours apart, she should get an event monitor. Follow-up to be determined after that.

## 2017-10-04 NOTE — TELEPHONE ENCOUNTER
Patient C/O palpitations off and on since weekend with fatigue her BP this am 176/102 pulse 68 current weight 178#  BP this evening 131/86 pulse 61 no distress at this time please advise thanks

## 2017-10-05 ENCOUNTER — CLINICAL SUPPORT (OUTPATIENT)
Dept: CARDIOLOGY CLINIC | Age: 77
End: 2017-10-05

## 2017-10-05 DIAGNOSIS — R00.2 PALPITATIONS: Primary | ICD-10-CM

## 2017-10-12 ENCOUNTER — TELEPHONE (OUTPATIENT)
Dept: CARDIOLOGY CLINIC | Age: 77
End: 2017-10-12

## 2017-10-12 PROBLEM — I48.0 PAF (PAROXYSMAL ATRIAL FIBRILLATION) (HCC): Status: ACTIVE | Noted: 2017-10-12

## 2017-10-12 NOTE — TELEPHONE ENCOUNTER
----- Message from Tamika Cardoza MD sent at 10/12/2017 12:20 PM EDT -----  Bonnie Jaqui- a few short runs of PAF- please f/u with me or an NP in the near future to discuss.

## 2017-10-12 NOTE — TELEPHONE ENCOUNTER
Verified patient with two identifiers. Pt informed of monitor results. Appt made to see Abhishek Squires DNP to discuss. Pt verbalized understanding.

## 2017-10-17 ENCOUNTER — OFFICE VISIT (OUTPATIENT)
Dept: CARDIOLOGY CLINIC | Age: 77
End: 2017-10-17

## 2017-10-17 VITALS
HEIGHT: 63 IN | RESPIRATION RATE: 20 BRPM | WEIGHT: 180.5 LBS | OXYGEN SATURATION: 96 % | SYSTOLIC BLOOD PRESSURE: 124 MMHG | DIASTOLIC BLOOD PRESSURE: 68 MMHG | BODY MASS INDEX: 31.98 KG/M2 | HEART RATE: 62 BPM

## 2017-10-17 DIAGNOSIS — I10 BENIGN ESSENTIAL HYPERTENSION: ICD-10-CM

## 2017-10-17 DIAGNOSIS — I25.10 CORONARY ARTERY DISEASE INVOLVING NATIVE CORONARY ARTERY OF NATIVE HEART WITHOUT ANGINA PECTORIS: ICD-10-CM

## 2017-10-17 DIAGNOSIS — I48.0 PAF (PAROXYSMAL ATRIAL FIBRILLATION) (HCC): Primary | ICD-10-CM

## 2017-10-17 DIAGNOSIS — Z98.61 S/P PTCA (PERCUTANEOUS TRANSLUMINAL CORONARY ANGIOPLASTY): ICD-10-CM

## 2017-10-17 DIAGNOSIS — I44.7 LEFT BUNDLE-BRANCH BLOCK: ICD-10-CM

## 2017-10-17 RX ORDER — METOPROLOL TARTRATE 50 MG/1
50 TABLET ORAL 2 TIMES DAILY
Qty: 60 TAB | Refills: 6 | Status: SHIPPED | OUTPATIENT
Start: 2017-10-17 | End: 2017-11-07 | Stop reason: ALTCHOICE

## 2017-10-17 RX ORDER — LISINOPRIL AND HYDROCHLOROTHIAZIDE 12.5; 2 MG/1; MG/1
1 TABLET ORAL DAILY
Qty: 180 TAB | Refills: 3 | Status: SHIPPED | OUTPATIENT
Start: 2017-10-17 | End: 2017-11-07 | Stop reason: SDUPTHER

## 2017-10-17 NOTE — PROGRESS NOTES
Dash Martínez DNP, ANP-BC  Subjective/HPI:     Yvette Henry is a 78 y.o. female is here for Holter monitor follow-up noting episodes of paroxysmal atrial fibrillation with associated symptoms. Initially Ms. Katerina Degroot had reported 3 days of fluttering and palpitations which started after 1/4 mile walk. She has maintained aspirin as well as beta-blocker therapy at present time she continues to feel an occasional palpitation but the frequency and intensity has steadily been decreasing. Results:   Underlying Rhythm: Normal sinus rhythm      Atrial Arrhythmias: premature atrial contractions; rare and 4 short runs of symptomatic paroxysmal atrial fibrillation with RVR    AV Conduction: bundle branch block    Ventricular Arrhythmias: premature ventricular contractions; rare     ST Segment Analysis:non-specific changes     Symptom Correlation:  Palpitations correspond to PAF with RVR. PCP Provider  Ubaldo Taylor MD  Past Medical History:   Diagnosis Date    CAD (coronary artery disease)     Family history of skin cancer     Hypercholesteremia     Hypertension     Psychiatric disorder     depression    Skin cancer       Past Surgical History:   Procedure Laterality Date    AMB POC MOHS 1 STAGE H/N/HF/G      CARDIAC CATHETERIZATION  5/19/2012         DRUG ELUTING STENT SINGLE VESS  5/19/2012     Dr Michelle Ramon   [place]  04/13/2017    BCC left superior helical rim by Dr. Kinga Wellington [Amlodipine] Swelling      Family History   Problem Relation Age of Onset    Coronary Artery Disease Other      daughter stent age 28      Current Outpatient Prescriptions   Medication Sig    metoprolol tartrate (LOPRESSOR) 50 mg tablet Take 1 Tab by mouth two (2) times a day.  lisinopril-hydroCHLOROthiazide (PRINZIDE, ZESTORETIC) 20-12.5 mg per tablet Take 1 Tab by mouth daily.  apixaban (ELIQUIS) 5 mg tablet Take 1 Tab by mouth two (2) times a day.     doxazosin (CARDURA) 1 mg tablet TAKE 1 TABLET BY MOUTH EVERY DAY    cyanocobalamin 1,000 mcg tablet Take 1,000 mcg by mouth daily.  LIPITOR 20 mg tablet TAKE 1 TABLET BY MOUTH EVERY DAY    ZOLOFT 25 mg tablet Take 25 mg by mouth daily.  ranitidine (ZANTAC 75) 75 mg tablet Take 75 mg by mouth as needed.  b complex vitamins tablet Take 1 Tab by mouth daily.  esomeprazole (NEXIUM) 20 mg capsule Take 22.3 mg by mouth as needed.  nitroglycerin (NITROSTAT) 0.4 mg SL tablet 1 tablet by SubLINGual route every five (5) minutes as needed (call 911 if not relieved by 3).  omega-3 fatty acids-vitamin e (FISH OIL) 1,000 mg Cap Take 1 Cap by mouth two (2) times a day.  cholecalciferol, vitamin D3, (VITAMIN D3) 2,000 unit Tab Take  by mouth.  aspirin 81 mg chewable tablet Take 1 Tab by mouth daily.  guaiFENesin-codeine (CHERATUSSIN AC) 100-10 mg/5 mL solution Take 5 mL by mouth three (3) times daily as needed for Cough. Max Daily Amount: 15 mL.  predniSONE (STERAPRED DS) 10 mg dose pack Take as directed on packaging    bupivacaine-EPINEPHrine (MARCAINE-EPINEPHRINE) 0.25 %-1:200,000 soln Used for mohs surgery    multivitamin (ONE A DAY) tablet Take 1 Tab by mouth daily. No current facility-administered medications for this visit. Vitals:    10/17/17 1405 10/17/17 1415   BP: 122/70 124/68   Pulse: 62    Resp: 20    SpO2: 96%    Weight: 180 lb 8 oz (81.9 kg)    Height: 5' 3\" (1.6 m)      Social History     Social History    Marital status:      Spouse name: N/A    Number of children: N/A    Years of education: N/A     Occupational History    Not on file.      Social History Main Topics    Smoking status: Former Smoker     Packs/day: 0.50     Years: 50.00     Quit date: 5/25/2012    Smokeless tobacco: Never Used    Alcohol use Yes      Comment: occasional    Drug use: No    Sexual activity: Not on file     Other Topics Concern    Not on file     Social History Narrative I have reviewed the nurses notes, vitals, problem list, allergy list, medical history, family, social history and medications. Review of Symptoms: At present time today    General: Pt denies excessive weight gain or loss. Pt is able to conduct ADL's  HEENT: Denies blurred vision, headaches, epistaxis and difficulty swallowing. Respiratory: Denies shortness of breath, REYNA, wheezing or stridor. Cardiovascular: Denies precordial pain, palpitations, edema or PND  Gastrointestinal: Denies poor appetite, indigestion, abdominal pain or blood in stool  Musculoskeletal: Denies pain or swelling from muscles or joints  Neurologic: Denies tremor, paresthesias, or sensory motor disturbance  Skin: Denies rash, itching or texture change. Physical Exam:      General: Well developed, in no acute distress, cooperative and alert  HEENT: No carotid bruits, no JVD, trach is midline. Neck Supple, PEERL, EOM intact. Heart:  Normal S1/S2 negative S3 or S4. Regular, no murmur, gallop or rub.   Respiratory: Clear bilaterally x 4, no wheezing or rales  Abdomen:   Soft, non-tender, no masses, bowel sounds are active.   Extremities:  No edema, normal cap refill, no cyanosis, atraumatic. Neuro: A&Ox3, speech clear, gait stable. Skin: Skin color is normal. No rashes or lesions.  Non diaphoretic  Vascular: 2+ pulses symmetric in all extremities    Cardiographics    ECG: Sinus rhythm with left bundle branch block   Results for orders placed or performed in visit on 10/05/17   CARDIAC HOLTER MONITOR, 24 HOURS    Narrative    ECG Monitor/24 hours, Complete    Reason for Holter Monitor   PALPITATIONS    Heartbeat    Slowest 48  Average 60  Fastest  91      Results:   Underlying Rhythm: Normal sinus rhythm      Atrial Arrhythmias: premature atrial contractions; rare and 4 short runs of symptomatic paroxysmal atrial fibrillation with RVR    AV Conduction: bundle branch block    Ventricular Arrhythmias: premature ventricular contractions; rare     ST Segment Analysis:non-specific changes     Symptom Correlation:  Palpitations correspond to PAF with RVR. Devon Hyde MD      Results for orders placed or performed during the hospital encounter of 09/12/14   EKG, 12 LEAD, INITIAL   Result Value Ref Range    Ventricular Rate 60 BPM    Atrial Rate 60 BPM    P-R Interval 172 ms    QRS Duration 146 ms    Q-T Interval 470 ms    QTC Calculation (Bezet) 470 ms    Calculated P Axis 12 degrees    Calculated R Axis -7 degrees    Calculated T Axis 57 degrees    Diagnosis       Normal sinus rhythm  Left bundle branch block  When compared with ECG of 18-APR-2013 02:38,  No significant change was found  Confirmed by Karey Beyer ([id number]) on 9/12/2014 1:39:12 PM         Cardiology Labs:  Lab Results   Component Value Date/Time    Cholesterol, total 161 10/16/2014 10:38 AM    HDL Cholesterol 85 10/16/2014 10:38 AM    LDL, calculated 65 10/16/2014 10:38 AM    Triglyceride 56 10/16/2014 10:38 AM    CHOL/HDL Ratio 2.7 05/20/2012 03:50 AM       Lab Results   Component Value Date/Time    Sodium 139 01/02/2015 09:00 AM    Potassium 4.0 01/02/2015 09:00 AM    Chloride 96 01/02/2015 09:00 AM    CO2 26 01/02/2015 09:00 AM    Anion gap 9 09/12/2014 01:25 PM    Glucose 96 01/02/2015 09:00 AM    BUN 18 01/02/2015 09:00 AM    Creatinine 0.85 01/02/2015 09:00 AM    BUN/Creatinine ratio 21 01/02/2015 09:00 AM    GFR est AA 79 01/02/2015 09:00 AM    GFR est non-AA 69 01/02/2015 09:00 AM    Calcium 9.4 01/02/2015 09:00 AM    Bilirubin, total 0.5 10/16/2014 10:38 AM    AST (SGOT) 24 10/16/2014 10:38 AM    Alk. phosphatase 99 10/16/2014 10:38 AM    Protein, total 6.5 10/16/2014 10:38 AM    Albumin 4.4 10/16/2014 10:38 AM    Globulin 3.7 09/12/2014 01:25 PM    A-G Ratio 2.1 10/16/2014 10:38 AM    ALT (SGPT) 11 10/16/2014 10:38 AM           Assessment:     Assessment:     Diagnoses and all orders for this visit:    1.  [facility]ox[facility]  - AMB POC EKG ROUTINE W/ 12 LEADS, INTER & REP  -     METABOLIC PANEL, COMPREHENSIVE  -     THYROID PANEL W/TSH  -     MAGNESIUM    2. Benign essential hypertension    3. Coronary artery disease involving native coronary artery of native heart without angina pectoris    4. S/P PTCA (percutaneous transluminal coronary angioplasty)    5. Left bundle-branch block    Other orders  -     metoprolol tartrate (LOPRESSOR) 50 mg tablet; Take 1 Tab by mouth two (2) times a day. -     lisinopril-hydroCHLOROthiazide (PRINZIDE, ZESTORETIC) 20-12.5 mg per tablet; Take 1 Tab by mouth daily. -     apixaban (ELIQUIS) 5 mg tablet; Take 1 Tab by mouth two (2) times a day. ICD-10-CM ICD-9-CM    1. PAF (paroxysmal atrial fibrillation) ([facility]) I48.0 427.31 AMB POC EKG ROUTINE W/ 12 LEADS, INTER & REP      METABOLIC PANEL, COMPREHENSIVE      THYROID PANEL W/TSH      MAGNESIUM   2. Benign essential hypertension I10 401.1    3. Coronary artery disease involving native coronary artery of native heart without angina pectoris I25.10 414.01    4. S/P PTCA (percutaneous transluminal coronary angioplasty) Z98.61 V45.82    5. Left bundle-branch block I44.7 426.3      Orders Placed This Encounter    METABOLIC PANEL, COMPREHENSIVE    THYROID PANEL W/TSH    MAGNESIUM    AMB POC EKG ROUTINE W/ 12 LEADS, INTER & REP     Order Specific Question:   Reason for Exam:     Answer:   Lorrie Quintanilla    metoprolol tartrate (LOPRESSOR) 50 mg tablet     Sig: Take 1 Tab by mouth two (2) times a day. Dispense:  60 Tab     Refill:  6    lisinopril-hydroCHLOROthiazide (PRINZIDE, ZESTORETIC) 20-12.5 mg per tablet     Sig: Take 1 Tab by mouth daily. Dispense:  180 Tab     Refill:  3    apixaban (ELIQUIS) 5 mg tablet     Sig: Take 1 Tab by mouth two (2) times a day. Dispense:  60 Tab     Refill:  3        Plan:     Patient is a 74-year-old female with newly diagnosed atrial fibrillation on Holter monitor coinciding with her symptoms.   Presenting in normal sinus rhythm today with chronic left bundle branch block I will increase beta-blocker Lopressor 50 mg twice a day, reduce lisinopril hydrochlorothiazide to reduce the chances of hypotension. Chads vas 2 score is 4. Will start patient on Eliquis 5 mg twice a day, checking labs to include metabolic panel thyroid magnesium.  ECHO  Follow-up in 1 month    Nan Kay NP

## 2017-10-17 NOTE — MR AVS SNAPSHOT
Visit Information Date & Time Provider Department Dept. Phone Encounter #  
 10/17/2017  2:15 PM Larence Heimlich, NP [facility] [phone] Your Appointments 12/26/2017  8:30 AM  
SURGERY with MD Ramez Swann [address] [place] [address]) Appt Note: Est  BCC  LT nose Dr. Claudell Fells [facility] [address] Lopez[address] [facility] [address]  
[address] [place] [address] [address] [facility] [address] Upcoming Health Maintenance Date Due DTaP/Tdap/Td series (1 - Tdap) 5/2/1963 ZOSTER VACCINE AGE 60> 3/2/2002 GLAUCOMA SCREENING Q2Y 5/2/2007 OSTEOPOROSIS SCREENING (DEXA) 5/2/2007 Pneumococcal 65+ Low/Medium Risk (1 of 2 - PCV13) 5/2/2007 MEDICARE YEARLY EXAM 5/2/2007 INFLUENZA AGE 9 TO ADULT 8/1/2017 Allergies as of 10/17/2017  Review Complete On: 10/17/2017 By: Richie Neither, LPN Severity Noted Reaction Type Reactions Norvasc [Amlodipine]  12/02/2014    Swelling Current Immunizations  Reviewed on 9/21/2012 Name Date Influenza Vaccine Split 11/21/2011 Not reviewed this visit You Were Diagnosed With   
  
 Codes Comments PAF (paroxysmal atrial fibrillation) ([facility]    -  Primary ICD-10-CM: I48.0 ICD-9-CM: 427.31 Vitals BP Pulse Resp Height(growth percentile) Weight(growth percentile) SpO2  
 124/68 (BP 1 Location: Right arm, BP Patient Position: Sitting) 62 20 5' 3\" (1.6 m) 180 lb 8 oz (81.9 kg) 96% BMI OB Status Smoking Status 31.97 kg/m2 Postmenopausal Former Smoker Vitals History BMI and BSA Data Body Mass Index Body Surface Area  
 31.97 kg/m 2 1.91 m 2 Preferred Pharmacy Pharmacy Name Phone [id number] [address] [phone] Your Updated Medication List  
  
   
 This list is accurate as of: 10/17/17  2:59 PM.  Always use your most recent med list.  
  
  
  
  
 apixaban 5 mg tablet Commonly known as:  Ren Fraser Take 1 Tab by mouth two (2) times a day. aspirin 81 mg chewable tablet Take 1 Tab by mouth daily. b complex vitamins tablet Take 1 Tab by mouth daily. bupivacaine-EPINEPHrine 0.25 %-1:200,000 Soln Commonly known as:  Juan Bryson Used for mohs surgery  
  
 cyanocobalamin 1,000 mcg tablet Take 1,000 mcg by mouth daily. doxazosin 1 mg tablet Commonly known as:  CARDURA TAKE 1 TABLET BY MOUTH EVERY DAY  
  
 FISH OIL 1,000 mg Cap Generic drug:  omega-3 fatty acids-vitamin e Take 1 Cap by mouth two (2) times a day. guaiFENesin-codeine 100-10 mg/5 mL solution Commonly known as:  [place] Sow Take 5 mL by mouth three (3) times daily as needed for Cough. Max Daily Amount: 15 mL. LIPITOR 20 mg tablet Generic drug:  atorvastatin TAKE 1 TABLET BY MOUTH EVERY DAY  
  
 lisinopril-hydroCHLOROthiazide 20-12.5 mg per tablet Commonly known as:  Helyn Blinks Take 1 Tab by mouth daily. metoprolol tartrate 50 mg tablet Commonly known as:  LOPRESSOR Take 1 Tab by mouth two (2) times a day. multivitamin tablet Commonly known as:  ONE A DAY Take 1 Tab by mouth daily. NexIUM 20 mg capsule Generic drug:  esomeprazole Take 22.3 mg by mouth as needed. nitroglycerin 0.4 mg SL tablet Commonly known as:  NITROSTAT  
1 tablet by SubLINGual route every five (5) minutes as needed (call 911 if not relieved by 3). predniSONE 10 mg dose pack Commonly known as:  STERAPRED DS Take as directed on packaging VITAMIN D3 2,000 unit Tab Generic drug:  cholecalciferol (vitamin D3) Take  by mouth. ZANTAC 75 75 mg tablet Generic drug:  raNITIdine Take 75 mg by mouth as needed. ZOLOFT 25 mg tablet Generic drug:  sertraline Take 25 mg by mouth daily. Prescriptions Sent to Pharmacy Refills  
 metoprolol tartrate (LOPRESSOR) 50 mg tablet 6 Sig: Take 1 Tab by mouth two (2) times a day. Class: Normal  
 [facility] Ph #: [phone] Route: Oral  
 lisinopril-hydroCHLOROthiazide (PRINZIDE, ZESTORETIC) 20-12.5 mg per tablet 3 Sig: Take 1 Tab by mouth daily. Class: Normal  
 Pharmacy: [facility]/pharmacy #[id number] Ph #: [phone] Route: Oral  
 apixaban (ELIQUIS) 5 mg tablet 3 Sig: Take 1 Tab by mouth two (2) times a day. Class: Normal  
 [id number] Ph #: [phone] Route: Oral  
  
We Performed the Following AMB POC EKG ROUTINE W/ 12 LEADS, INTER & REP [11820 CPT(R)] MAGNESIUM [id number] CPT(R)] METABOLIC PANEL, COMPREHENSIVE [83577 CPT(R)] THYROID PANEL W/TSH [55005 CPT(R)] Introducing [facility]! Dear Nayely Higgins: Thank you for requesting a CastleOS account. Our records indicate that you already have an active CastleOS account. You can access your account anytime at [email or web address] Did you know that you can access your hospital and ER discharge instructions at any time in CastleOS? You can also review all of your test results from your hospital stay or ER visit. Additional Information If you have questions, please visit the Frequently Asked Questions section of the CastleOS website at [email or web address] Remember, CastleOS is NOT to be used for urgent needs. For medical emergencies, dial 911. Now available from your iPhone and Android! Please provide this summary of care documentation to your next provider. Your primary care clinician is listed as Emerson Huff.  If you have any questions after today's visit, please call [phone].

## 2017-10-18 ENCOUNTER — TELEPHONE (OUTPATIENT)
Dept: CARDIOLOGY CLINIC | Age: 77
End: 2017-10-18

## 2017-10-18 LAB
ALBUMIN SERPL-MCNC: 4.3 G/DL (ref 3.5–4.8)
ALBUMIN/GLOB SERPL: 1.7 {RATIO} (ref 1.2–2.2)
ALP SERPL-CCNC: 93 IU/L (ref 39–117)
ALT SERPL-CCNC: 11 IU/L (ref 0–32)
AST SERPL-CCNC: 17 IU/L (ref 0–40)
BILIRUB SERPL-MCNC: 0.6 MG/DL (ref 0–1.2)
BUN SERPL-MCNC: 13 MG/DL (ref 8–27)
BUN/CREAT SERPL: 16 (ref 12–28)
CALCIUM SERPL-MCNC: 9.7 MG/DL (ref 8.7–10.3)
CHLORIDE SERPL-SCNC: 94 MMOL/L (ref 96–106)
CO2 SERPL-SCNC: 27 MMOL/L (ref 18–29)
CREAT SERPL-MCNC: 0.79 MG/DL (ref 0.57–1)
FT4I SERPL CALC-MCNC: 1.2 (ref 1.2–4.9)
GLOBULIN SER CALC-MCNC: 2.6 G/DL (ref 1.5–4.5)
GLUCOSE SERPL-MCNC: 89 MG/DL (ref 65–99)
MAGNESIUM SERPL-MCNC: 1.5 MG/DL (ref 1.6–2.3)
POTASSIUM SERPL-SCNC: 4 MMOL/L (ref 3.5–5.2)
PROT SERPL-MCNC: 6.9 G/DL (ref 6–8.5)
SODIUM SERPL-SCNC: 138 MMOL/L (ref 134–144)
T3RU NFR SERPL: 26 % (ref 24–39)
T4 SERPL-MCNC: 4.7 UG/DL (ref 4.5–12)
TSH SERPL DL<=0.005 MIU/L-ACNC: 1.81 UIU/ML (ref 0.45–4.5)

## 2017-10-18 RX ORDER — LANOLIN ALCOHOL/MO/W.PET/CERES
400 CREAM (GRAM) TOPICAL DAILY
Qty: 30 TAB | Refills: 6 | Status: SHIPPED | OUTPATIENT
Start: 2017-10-18 | End: 2017-11-07 | Stop reason: ALTCHOICE

## 2017-10-18 NOTE — TELEPHONE ENCOUNTER
----- Message from Anitra Moser NP sent at 10/18/2017  8:57 AM EDT -----  Please call patient, magnesium level is low which can also contribute to a fib. I sent in script for mag ox 400mg daily.

## 2017-10-18 NOTE — PROGRESS NOTES
Please call patient, magnesium level is low which can also contribute to a fib. I sent in script for mag ox 400mg daily.

## 2017-11-07 ENCOUNTER — OFFICE VISIT (OUTPATIENT)
Dept: CARDIOLOGY CLINIC | Age: 77
End: 2017-11-07

## 2017-11-07 VITALS
HEIGHT: 63 IN | HEART RATE: 60 BPM | WEIGHT: 180 LBS | OXYGEN SATURATION: 95 % | DIASTOLIC BLOOD PRESSURE: 90 MMHG | BODY MASS INDEX: 31.89 KG/M2 | RESPIRATION RATE: 12 BRPM | SYSTOLIC BLOOD PRESSURE: 160 MMHG

## 2017-11-07 DIAGNOSIS — I10 BENIGN ESSENTIAL HYPERTENSION: ICD-10-CM

## 2017-11-07 DIAGNOSIS — I44.7 LEFT BUNDLE-BRANCH BLOCK: ICD-10-CM

## 2017-11-07 DIAGNOSIS — I25.10 CORONARY ARTERY DISEASE INVOLVING NATIVE CORONARY ARTERY OF NATIVE HEART WITHOUT ANGINA PECTORIS: ICD-10-CM

## 2017-11-07 DIAGNOSIS — E78.2 MIXED HYPERLIPIDEMIA: ICD-10-CM

## 2017-11-07 DIAGNOSIS — I48.0 PAF (PAROXYSMAL ATRIAL FIBRILLATION) (HCC): Primary | ICD-10-CM

## 2017-11-07 RX ORDER — METOPROLOL SUCCINATE 50 MG/1
50 TABLET, EXTENDED RELEASE ORAL
Qty: 90 TAB | Refills: 3 | Status: SHIPPED | OUTPATIENT
Start: 2017-11-07 | End: 2018-10-24 | Stop reason: SDUPTHER

## 2017-11-07 RX ORDER — LISINOPRIL AND HYDROCHLOROTHIAZIDE 12.5; 2 MG/1; MG/1
1 TABLET ORAL 2 TIMES DAILY
Qty: 180 TAB | Refills: 3 | Status: SHIPPED | OUTPATIENT
Start: 2017-11-07 | End: 2018-11-16 | Stop reason: SDUPTHER

## 2017-11-07 NOTE — MR AVS SNAPSHOT
Visit Information Date & Time Provider Department Dept. Phone Encounter #  
 11/7/2017  1:15 PM Antony Bear NP [facility] [phone] [id number] Your Appointments 12/26/2017  8:30 AM  
SURGERY with MD Kash Fall [address]) Appt Note: Est  BCC  LT nose Dr. Jarett Portillo [facility] [id number] [place] [address] Upcoming Health Maintenance Date Due DTaP/Tdap/Td series (1 - Tdap) 5/2/1963 ZOSTER VACCINE AGE 60> 3/2/2002 GLAUCOMA SCREENING Q2Y 5/2/2007 OSTEOPOROSIS SCREENING (DEXA) 5/2/2007 Pneumococcal 65+ Low/Medium Risk (1 of 2 - PCV13) 5/2/2007 MEDICARE YEARLY EXAM 5/2/2007 Influenza Age 5 to Adult 8/1/2017 Allergies as of 11/7/2017  Review Complete On: 11/7/2017 By: Joan Bryson LPN Severity Noted Reaction Type Reactions Norvasc [Amlodipine]  12/02/2014    Swelling Current Immunizations  Reviewed on 9/21/2012 Name Date Influenza Vaccine Split 11/21/2011 Not reviewed this visit You Were Diagnosed With   
  
 Codes Comments PAF (paroxysmal atrial fibrillation) ([facility]    -  Primary ICD-10-CM: I48.0 ICD-9-CM: 427.31 Benign essential hypertension     ICD-10-CM: I10 
ICD-9-CM: 401.1 Mixed hyperlipidemia     ICD-10-CM: E78.2 ICD-9-CM: 272.2 Coronary artery disease involving native coronary artery of native heart without angina pectoris     ICD-10-CM: I25.10 ICD-9-CM: 414.01 Left bundle-branch block     ICD-10-CM: I44.7 ICD-9-CM: 426. 3 Vitals BP Pulse Resp Height(growth percentile) Weight(growth percentile) SpO2  
 160/90 (BP Patient Position: Sitting) 60 12 5' 3\" (1.6 m) 180 lb (81.6 kg) 95% BMI OB Status Smoking Status 31.89 kg/m2 Postmenopausal Former Smoker BMI and BSA Data Body Mass Index Body Surface Area  
 31.89 kg/m 2 1.9 m 2 Preferred Pharmacy Pharmacy Name Phone [id number] Weathers, [address] [place] [address] Street [phone] Your Updated Medication List  
  
   
This list is accurate as of: 11/7/17  1:54 PM.  Always use your most recent med list.  
  
  
  
  
 apixaban 5 mg tablet Commonly known as:  Shanae Spatz Take 1 Tab by mouth two (2) times a day. aspirin 81 mg chewable tablet Take 1 Tab by mouth daily. b complex vitamins tablet Take 1 Tab by mouth daily. bupivacaine-EPINEPHrine 0.25 %-1:200,000 Soln Commonly known as:  Alek Gallon Used for mohs surgery  
  
 cyanocobalamin 1,000 mcg tablet Take 1,000 mcg by mouth daily. doxazosin 1 mg tablet Commonly known as:  CARDURA TAKE 1 TABLET BY MOUTH EVERY DAY  
  
 FISH OIL 1,000 mg Cap Generic drug:  omega-3 fatty acids-vitamin e Take 1 Cap by mouth two (2) times a day. guaiFENesin-codeine 100-10 mg/5 mL solution Commonly known as:  [place] Garcia Take 5 mL by mouth three (3) times daily as needed for Cough. Max Daily Amount: 15 mL. LIPITOR 20 mg tablet Generic drug:  atorvastatin TAKE 1 TABLET BY MOUTH EVERY DAY  
  
 lisinopril-hydroCHLOROthiazide 20-12.5 mg per tablet Commonly known as:  [place] Haste Take 1 Tab by mouth two (2) times a day. metoprolol succinate 50 mg XL tablet Commonly known as:  TOPROL-XL Take 1 Tab by mouth nightly. NexIUM 20 mg capsule Generic drug:  esomeprazole Take 22.3 mg by mouth as needed. nitroglycerin 0.4 mg SL tablet Commonly known as:  NITROSTAT  
1 tablet by SubLINGual route every five (5) minutes as needed (call 911 if not relieved by 3). VITAMIN D3 2,000 unit Tab Generic drug:  cholecalciferol (vitamin D3) Take  by mouth. ZANTAC 75 75 mg tablet Generic drug:  raNITIdine Take 75 mg by mouth as needed. ZOLOFT 25 mg tablet Generic drug:  sertraline Take 25 mg by mouth daily. Prescriptions Sent to Pharmacy Refills  
 lisinopril-hydroCHLOROthiazide (PRINZIDE, ZESTORETIC) 20-12.5 mg per tablet 3 Sig: Take 1 Tab by mouth two (2) times a day. Class: Normal  
 [id number] [address] Ph #: [phone] Route: Oral  
 metoprolol succinate (TOPROL-XL) 50 mg XL tablet 3 Sig: Take 1 Tab by mouth nightly. Class: Normal  
 Pharmacy: [facility]/pharmacy #[id number] Ph #: [phone] Route: Oral  
  
We Performed the Following MAGNESIUM [id number] CPT(R)] Introducing [facility]! Dear Jaskaran Harrington: Thank you for requesting a Vrvana account. Our records indicate that you already have an active Vrvana account. You can access your account anytime at [email or web address] Did you know that you can access your hospital and ER discharge instructions at any time in Vrvana? You can also review all of your test results from your hospital stay or ER visit. Additional Information If you have questions, please visit the Frequently Asked Questions section of the Vrvana website at [email or web address] Remember, Vrvana is NOT to be used for urgent needs. For medical emergencies, dial 911. Now available from your iPhone and Android! Please provide this summary of care documentation to your next provider. Your primary care clinician is listed as Harsha Chester. If you have any questions after today's visit, please call [phone].

## 2017-11-07 NOTE — PROGRESS NOTES
Puneet Ruiz DNP, ANP-BC  Subjective/HPI:     Sheryl Field is a 78 y.o. female is here for routine f/u. The patient denies chest pain/ shortness of breath, orthopnea, PND, LE edema, palpitations, syncope, presyncope or fatigue. PCP Provider  Lucy Franklin MD  Past Medical History:   Diagnosis Date    CAD (coronary artery disease)     Family history of skin cancer     Hypercholesteremia     Hypertension     Psychiatric disorder     depression    Skin cancer       Past Surgical History:   Procedure Laterality Date    AMB POC MOHS 1 STAGE H/N/HF/G      CARDIAC CATHETERIZATION  5/19/2012         DRUG ELUTING STENT SINGLE VESS  5/19/2012     Dr Jamie Flores Hard MOHS PROCEDURES  04/13/2017    BCC left superior helical rim by Dr. Enoch Ford [Amlodipine] Swelling      Family History   Problem Relation Age of Onset    Coronary Artery Disease Other      daughter stent age 28      Current Outpatient Prescriptions   Medication Sig    lisinopril-hydroCHLOROthiazide (PRINZIDE, ZESTORETIC) 20-12.5 mg per tablet Take 1 Tab by mouth two (2) times a day.  metoprolol succinate (TOPROL-XL) 50 mg XL tablet Take 1 Tab by mouth nightly.  apixaban (ELIQUIS) 5 mg tablet Take 1 Tab by mouth two (2) times a day.  doxazosin (CARDURA) 1 mg tablet TAKE 1 TABLET BY MOUTH EVERY DAY    cyanocobalamin 1,000 mcg tablet Take 1,000 mcg by mouth daily.  guaiFENesin-codeine (CHERATUSSIN AC) 100-10 mg/5 mL solution Take 5 mL by mouth three (3) times daily as needed for Cough. Max Daily Amount: 15 mL.  bupivacaine-EPINEPHrine (MARCAINE-EPINEPHRINE) 0.25 %-1:200,000 soln Used for mohs surgery    LIPITOR 20 mg tablet TAKE 1 TABLET BY MOUTH EVERY DAY    ZOLOFT 25 mg tablet Take 25 mg by mouth daily.  ranitidine (ZANTAC 75) 75 mg tablet Take 75 mg by mouth as needed.  b complex vitamins tablet Take 1 Tab by mouth daily.     esomeprazole (NEXIUM) 20 mg capsule Take 22.3 mg by mouth as needed.  nitroglycerin (NITROSTAT) 0.4 mg SL tablet 1 tablet by SubLINGual route every five (5) minutes as needed (call 911 if not relieved by 3).  omega-3 fatty acids-vitamin e (FISH OIL) 1,000 mg Cap Take 1 Cap by mouth two (2) times a day.  cholecalciferol, vitamin D3, (VITAMIN D3) 2,000 unit Tab Take  by mouth.  aspirin 81 mg chewable tablet Take 1 Tab by mouth daily. No current facility-administered medications for this visit. Vitals:    11/07/17 1331   BP: 160/90   Pulse: 60   Resp: 12   SpO2: 95%   Weight: 180 lb (81.6 kg)   Height: 5' 3\" (1.6 m)     Social History     Social History    Marital status:      Spouse name: N/A    Number of children: N/A    Years of education: N/A     Occupational History    Not on file. Social History Main Topics    Smoking status: Former Smoker     Packs/day: 0.50     Years: 50.00     Quit date: 5/25/2012    Smokeless tobacco: Never Used    Alcohol use Yes      Comment: occasional    Drug use: No    Sexual activity: Not on file     Other Topics Concern    Not on file     Social History Narrative       I have reviewed the nurses notes, vitals, problem list, allergy list, medical history, family, social history and medications. Review of Symptoms:    General: Pt denies excessive weight gain or loss. Pt is able to conduct ADL's  HEENT: Denies blurred vision, headaches, epistaxis and difficulty swallowing. Respiratory: Denies shortness of breath, REYNA, wheezing or stridor. Cardiovascular: Denies precordial pain, palpitations, edema or PND  Gastrointestinal: Denies poor appetite, indigestion, abdominal pain or blood in stool  Musculoskeletal: Denies pain or swelling from muscles or joints  Neurologic: Denies tremor, paresthesias, or sensory motor disturbance  Skin: Denies rash, itching or texture change.       Physical Exam:      General: Well developed, in no acute distress, cooperative and alert  HEENT: No carotid bruits, no JVD, trach is midline. Neck Supple, PEERL, EOM intact. Heart:  Normal S1/S2 negative S3 or S4. Regular, no murmur, gallop or rub.   Respiratory: Clear bilaterally x 4, no wheezing or rales  Abdomen:   Soft, non-tender, no masses, bowel sounds are active.   Extremities:  No edema, normal cap refill, no cyanosis, atraumatic. Neuro: A&Ox3, speech clear, gait stable. Skin: Skin color is normal. No rashes or lesions. Non diaphoretic  Vascular: 2+ pulses symmetric in all extremities    Cardiographics    ECG: Sinus left bundle  Results for orders placed or performed in visit on 10/05/17   CARDIAC HOLTER MONITOR, 24 HOURS    Narrative    ECG Monitor/24 hours, Complete    Reason for Holter Monitor   PALPITATIONS    Heartbeat    Slowest 48  Average 60  Fastest  91      Results:   Underlying Rhythm: Normal sinus rhythm      Atrial Arrhythmias: premature atrial contractions; rare and 4 short runs of symptomatic paroxysmal atrial fibrillation with RVR    AV Conduction: bundle branch block    Ventricular Arrhythmias: premature ventricular contractions; rare     ST Segment Analysis:non-specific changes     Symptom Correlation:  Palpitations correspond to PAF with RVR.       Morris Mondragon MD      Results for orders placed or performed during the hospital encounter of 09/12/14   EKG, 12 LEAD, INITIAL   Result Value Ref Range    Ventricular Rate 60 BPM    Atrial Rate 60 BPM    P-R Interval 172 ms    QRS Duration 146 ms    Q-T Interval 470 ms    QTC Calculation (Bezet) 470 ms    Calculated P Axis 12 degrees    Calculated R Axis -7 degrees    Calculated T Axis 57 degrees    Diagnosis       Normal sinus rhythm  Left bundle branch block  When compared with ECG of 18-APR-2013 02:38,  No significant change was found  Confirmed by Zuhair Claire ([id number]) on 9/12/2014 1:39:12 PM         Cardiology Labs:  Lab Results   Component Value Date/Time    Cholesterol, total 161 10/16/2014 10:38 AM    HDL Cholesterol 85 10/16/2014 10:38 AM    LDL, calculated 65 10/16/2014 10:38 AM    Triglyceride 56 10/16/2014 10:38 AM    CHOL/HDL Ratio 2.7 05/20/2012 03:50 AM       Lab Results   Component Value Date/Time    Sodium 138 10/17/2017 03:12 PM    Potassium 4.0 10/17/2017 03:12 PM    Chloride 94 10/17/2017 03:12 PM    CO2 27 10/17/2017 03:12 PM    Anion gap 9 09/12/2014 01:25 PM    Glucose 89 10/17/2017 03:12 PM    BUN 13 10/17/2017 03:12 PM    Creatinine 0.79 10/17/2017 03:12 PM    BUN/Creatinine ratio 16 10/17/2017 03:12 PM    GFR est AA 85 10/17/2017 03:12 PM    GFR est non-AA 73 10/17/2017 03:12 PM    Calcium 9.7 10/17/2017 03:12 PM    Bilirubin, total 0.6 10/17/2017 03:12 PM    AST (SGOT) 17 10/17/2017 03:12 PM    Alk. phosphatase 93 10/17/2017 03:12 PM    Protein, total 6.9 10/17/2017 03:12 PM    Albumin 4.3 10/17/2017 03:12 PM    Globulin 3.7 09/12/2014 01:25 PM    A-G Ratio 1.7 10/17/2017 03:12 PM    ALT (SGPT) 11 10/17/2017 03:12 PM           Assessment:     Assessment:     Diagnoses and all orders for this visit:    1. PAF (paroxysmal atrial fibrillation) (HCC)  -     MAGNESIUM    2. Benign essential hypertension  -     MAGNESIUM    3. Mixed hyperlipidemia  -     MAGNESIUM    4. Coronary artery disease involving native coronary artery of native heart without angina pectoris  -     MAGNESIUM    5. Left bundle-branch block  -     MAGNESIUM    Other orders  -     lisinopril-hydroCHLOROthiazide (PRINZIDE, ZESTORETIC) 20-12.5 mg per tablet; Take 1 Tab by mouth two (2) times a day. -     metoprolol succinate (TOPROL-XL) 50 mg XL tablet; Take 1 Tab by mouth nightly. ICD-10-CM ICD-9-CM    1.  PAF (paroxysmal atrial fibrillation) (HCC) I48.0 427.31 MAGNESIUM   2. Benign essential hypertension I10 401.1 MAGNESIUM   3. Mixed hyperlipidemia E78.2 272.2 MAGNESIUM   4. Coronary artery disease involving native coronary artery of native heart without angina pectoris I25.10 414.01 MAGNESIUM   5. Left bundle-branch block I44.7 426.3 MAGNESIUM     Orders Placed This Encounter    MAGNESIUM    lisinopril-hydroCHLOROthiazide (PRINZIDE, ZESTORETIC) 20-12.5 mg per tablet     Sig: Take 1 Tab by mouth two (2) times a day. Dispense:  180 Tab     Refill:  3    metoprolol succinate (TOPROL-XL) 50 mg XL tablet     Sig: Take 1 Tab by mouth nightly. Dispense:  90 Tab     Refill:  3        Plan:     1. Hypertension: Elevated will resume lisinopril/hydrochlorothiazide twice daily. 2.  Paroxysmal atrial fibrillation: Tolerating metoprolol tartrate twice daily however experiencing fatigue, will change metoprolol XL 50 mg nightly. Continue home blood pressure monitoring call if systolic blood pressures greater than 140 mmHg. Follow-up in 2 months. patient pending dermatological procedure for small area of skin cancer, when requested may hold Eliquis 24 hours prior to the procedure, resume evening of the procedure.   Leo Antunez, OPAL

## 2017-11-23 LAB — MAGNESIUM SERPL-MCNC: 1.7 MG/DL (ref 1.6–2.3)

## 2017-11-24 ENCOUNTER — TELEPHONE (OUTPATIENT)
Dept: CARDIOLOGY CLINIC | Age: 77
End: 2017-11-24

## 2017-11-24 NOTE — PROGRESS NOTES
Please call patient magnesium back to normal but on low side, I would recommend taking Mag Ox on a regular basis, lets do Magnesium Oxide 400mg 1 po every other day as a standing script.

## 2017-11-24 NOTE — TELEPHONE ENCOUNTER
----- Message from Nevaeh Reyes NP sent at 11/24/2017  9:02 AM EST -----  Please call patient magnesium back to normal but on low side, I would recommend taking Mag Ox on a regular basis, lets do Magnesium Oxide 400mg 1 po every other day as a standing script.

## 2017-11-24 NOTE — TELEPHONE ENCOUNTER
Patient informed she has purchased the OTC magnesium 250 mg recommend 2 capsules every other  day if wants the prescription after this will call has refused from the pharmacy the 400 mg at this time.

## 2017-12-15 ENCOUNTER — TELEPHONE (OUTPATIENT)
Dept: DERMATOLOGY | Facility: AMBULATORY SURGERY CENTER | Age: 77
End: 2017-12-15

## 2017-12-15 NOTE — TELEPHONE ENCOUNTER
Patient Appointment Date: tues 12/26 @ [address] [facility], 78 y.o., female  Is calling for their Mohs Pre-Op Assessment    does not have Hepatitis C   does not have HIV (If YES, set up consult appointment)  does confirm site (if pathology available)  does ID site. (Can they still visibly see the site)    Brief description of tumor: (symptoms, If prior treatment, duration)    Allergies: Allergies   Allergen Reactions    Norvasc [Amlodipine] Swelling         does not have an Electrical Implanted Device (Pacemaker, Defibrillator, AICD, brain stimulator)      does not need antibiotics      is not taking NSAIDs    is taking aspirin      is not taking Garlic  is not taking Ginkgo  is not taking Ginseng  is taking Fish oils  is not taking Vit E    does take a blood thinner(i.e. Coumadin/Warfarin, Plavix, Brilinta, Pradaxa, Xarelto, Effient) Eliquis    is not taking Coumadin/Warfarin (If taking needs to have PT/INR drawn and faxed results within a week of surgery)      Pre operative assessment questions asked to patient. Patient has a general understanding of the procedure, and has been versed that there will be local anesthesia used in the procedure and that She will be ok to drive themselves to and from the appointment.

## 2017-12-15 NOTE — TELEPHONE ENCOUNTER
KELLEM regarding MOHs pre op assessment. Pt instructed to call back at earliest convenience.      Appointment on tues 12/26 @ 327  Site: L nose

## 2017-12-26 ENCOUNTER — OFFICE VISIT (OUTPATIENT)
Dept: DERMATOLOGY | Facility: AMBULATORY SURGERY CENTER | Age: 77
End: 2017-12-26

## 2017-12-26 VITALS
OXYGEN SATURATION: 98 % | WEIGHT: 180 LBS | SYSTOLIC BLOOD PRESSURE: 138 MMHG | HEIGHT: 63 IN | BODY MASS INDEX: 31.89 KG/M2 | HEART RATE: 63 BPM | RESPIRATION RATE: 16 BRPM | TEMPERATURE: 98 F | DIASTOLIC BLOOD PRESSURE: 70 MMHG

## 2017-12-26 DIAGNOSIS — C44.311 BASAL CELL CARCINOMA OF LEFT NASAL SIDEWALL: Primary | ICD-10-CM

## 2017-12-26 NOTE — PROGRESS NOTES
This note is written by Nancie Hamm, as dictated by Everett Monreal. Yenifer Awan MD.    CC: Basal cell carcinoma on the left nasal sidewall     History of present illness:     Gene Zayas is a 78 y.o. female referred by Romy Ferguson PA-C. She has a biopsy-proven nodular basal cell carcinoma on the lest nasal sidewall. This is a new basal cell carcinoma present for less than six months described as a red spot with no prior treatment. Biopsy confirmed the diagnosis of basal cell carcinoma, and I reviewed the written pathology. She is feeling well and in her usual state of health today. She has no pain, no current illnesses, no other skin concerns. Her allergies, medications, medical, and social history are reviewed by me today. I performed Mohs surgery on 04/13/2017 to treat a basal cell carcinoma on her left superior helical rim. Exam:     She is an awake, alert, and oriented 78 y.o. female who appears well and in no distress. There is no preauricular, submandibular, or cervical lymphadenopathy. I examined her nose. She has a 3 x 3 mm pink scar on her left nasal sidewall. She confirms location. She has a well healed surgical site on her left superior helical rim, no evidence of lesion recurrence. Assessment/plan:    1. Basal cell carcinoma, left nasal sidewall. I discussed the diagnosis of basal cell carcinoma and summarized the pathology report. Mohs surgery is indicated by site and size. The procedure was discussed, verbal and written consent were obtained. I performed the procedure. One stage was required to reach a tumor free plane. The surgical defect was managed with intermediate repair. There were no complications. She will follow up as needed as the site heals. Indications, risks, and options were discussed with Gene Zayas preoperatively.  Risks including, but not limited to: pain, bleeding, infection, tumor recurrence, scarring and damage to motor and/or sensory nerves, were discussed. Panchito Tyler chose Mohs surgery. Panchito Tyler was an acceptable surgery candidate. Panchito Tyler was placed in the appropriate position on the operating table in the Mohs surgery procedure room. The area was prepped and draped in the standard manner. Gentian violet was used to outline the clinical margins of the tumor. Local anesthesia was then obtained. The grossly visible tumor was then removed, an underlying layer was excised and mapped according to the Mohs technique, and the individual specimens examined microscopically. The process was repeated until microscopic examination of the tissue specimens confirmed a tumor-free plane. Hemostasis was obtained with electrosurgery and pressure. The wound was covered between stages with moist saline gauze. The wound management options of second intent healing, layered closure, local flap, and/or full thickness skin graft were discussed. Panchito Tyler understands the aims, risks, alternatives, and possible complications and elects to proceed with an intermediate layered closure. Wound margins were made vertical, edges undermined in the muscular plane, standing cones corrected at both poles followed by layered closure. The wound was closed with buried 6-0 polysorb suture in the subcutis to reduce tension on the skin edges, and skin edges were approximated with 6-0 polysorb suture in the dermis to reduce tension on the epidermis. The final closure length was 11 mm. The wound was bandaged with Vaseline, Telfa, gauze and Coverroll. Wound care instructions (written and verbal) and a follow up appointment were given to Panchito Tyler before discharge. Panchito Tyler was discharged in good condition. 2. History of nonmelanoma skin cancer. I discussed the diagnosis and recommend routine examinations with Hilda Bain PA-C for surveillance.     The documentation recorded by the scribe accurately reflects the service I personally performed and the decisions made by me. KEVIN [facility]   OFFICE PROCEDURE PROGRESS NOTE     Chart reviewed for the following:     Behzad Fernandez MD, have reviewed the History, Physical and updated the Allergic reactions for [address] performed immediately prior to start of procedure:     Behzad Fernandez MD, have performed the following reviews on Lamont Almonte prior to the start of the procedure:     * Patient was identified by name and date of birth   * Agreement on procedure being performed was verified   * Risks and Benefits explained to the patient   * Procedure site verified and marked as necessary   * Patient was positioned for comfort   * Consent was signed and verified     Time: 8:15 AM   Date of procedure: 12/26/2017  Procedure performed by: Nahun Prather.  Blake Fernandez MD   Provider assisted by: MA  Patient assisted by: self   How tolerated by patient: tolerated the procedure well with no complications   Comments: none

## 2017-12-26 NOTE — PATIENT INSTRUCTIONS
WOUND CARE INSTRUCTIONS    1. Keep the dressing clean and dry and do not remove for 48 hours. 2. Then change the dressing once a day as follows:  a. Wash hands before and after each dressing change. b. Remove dressing and wash site gently with mild soap and water, rinse, and pat dry.  c. Apply an ointment (Bacitracin, Polysporin, Neosporin, Petroleum jelly or Aquaphor). d. Apply a non-stick (Telfa) dressing or Band-Aid to cover the wound. Remove pressure bandage on Thursday. You may shower daily at this point, no bandage necessary. Glue will eventually come off within the next 2 weeks. If you still feel rough glue at this point, you may apply vaseline to site daily until fully removed. 3. Watch for:  BLEEDING: A small amount of drainage may occur. If bleeding occurs, elevate and rest the surgery site. Apply gauze and steady pressure for 15 minutes. If bleeding continues, call this office. INFECTION: Signs of infection include increased redness, pain, warmth, drainage of pus, and fever. If this occurs, call this office. 4. Special Instructions (follow any that are checked):  · [x] You have stitches that DO NOT need to be removed. · [x] Avoid bending at the waist and heavy lifting for two days. · [x] Sleep with your head elevated for the next two nights. · [x] Rest the surgery site and keep it elevated as much as possible for two days. · [x] You may apply an ice-pack for 10-15 minutes every waking hour for the rest of the day. · [] Eat a soft diet and avoid hot food and hot drinks for the rest of the day. · [] Other instructions: Follow up as directed. Take Tylenol or Ibuprofen for pain as needed. Once the site is healed with no remaining bandages or open areas, protect your surgical site and scar from the sun, as this area will be more sensitive.   Use a broad spectrum sunscreen SPF 30 or higher daily, and a chemical free product (one containing zinc oxide or titanium dioxide) is a good choice if the area is sensitive. You may begin to gently massage the surgical site in 2-3 weeks, rubbing in a circular motion along the scar. This can help reduce swelling and thickness of a scar. A scar cream may be used beginnning 1 month after the surgery. If you have any questions or concerns, please call our office Monday through Friday at [phone].

## 2017-12-26 NOTE — MR AVS SNAPSHOT
Visit Information Date & Time Provider Department Dept. Phone Encounter #  
 12/26/2017  8:30 AM [facility] [address] [phone] [id number] Your Appointments 1/3/2018  2:00 PM  
2 MONTH with [facility]) [id number] [facility]  
[phone] [address] [facility] Upcoming Health Maintenance Date Due DTaP/Tdap/Td series (1 - Tdap) 5/2/1963 ZOSTER VACCINE AGE 60> 3/2/2002 GLAUCOMA SCREENING Q2Y 5/2/2007 OSTEOPOROSIS SCREENING (DEXA) 5/2/2007 Pneumococcal 65+ Low/Medium Risk (1 of 2 - PCV13) 5/2/2007 MEDICARE YEARLY EXAM 5/2/2007 Influenza Age 5 to Adult 8/1/2017 Allergies as of 12/26/2017  Review Complete On: 12/26/2017 By: Rosaline Steinberg Severity Noted Reaction Type Reactions Norvasc [Amlodipine]  12/02/2014    Swelling Current Immunizations  Reviewed on 9/21/2012 Name Date Influenza Vaccine Split 11/21/2011 Not reviewed this visit You Were Diagnosed With   
  
 Codes Comments Basal cell carcinoma of left nasal sidewall    -  Primary ICD-10-CM: C44.311 ICD-9-CM: 173.31 Vitals BP Pulse Temp Resp Height(growth percentile) Weight(growth percentile) 138/70 (BP 1 Location: Left arm, BP Patient Position: Sitting) 63 98 °F (36.7 °C) (Oral) 16 5' 3\" (1.6 m) 180 lb (81.6 kg) SpO2 BMI OB Status Smoking Status 98% 31.89 kg/m2 Postmenopausal Former Smoker BMI and BSA Data Body Mass Index Body Surface Area  
 31.89 kg/m 2 1.9 m 2 Preferred Pharmacy Pharmacy Name Phone [id number] [address] [phone] Your Updated Medication List  
  
   
This list is accurate as of: 12/26/17  8:46 AM.  Always use your most recent med list.  
  
  
  
  
 apixaban 5 mg tablet Commonly known as:  Inna Falling Take 1 Tab by mouth two (2) times a day. aspirin 81 mg chewable tablet Take 1 Tab by mouth daily. b complex vitamins tablet Take 1 Tab by mouth daily. bupivacaine-EPINEPHrine 0.25 %-1:200,000 Soln Commonly known as:  Roylene Punt Used for mohs surgery  
  
 cyanocobalamin 1,000 mcg tablet Take 1,000 mcg by mouth daily. doxazosin 1 mg tablet Commonly known as:  CARDURA TAKE 1 TABLET BY MOUTH EVERY DAY  
  
 FISH OIL 1,000 mg Cap Generic drug:  omega-3 fatty acids-vitamin e Take 1 Cap by mouth two (2) times a day. guaiFENesin-codeine 100-10 mg/5 mL solution Commonly known as:  Gwenlyn Hugo Take 5 mL by mouth three (3) times daily as needed for Cough. Max Daily Amount: 15 mL. LIPITOR 20 mg tablet Generic drug:  atorvastatin TAKE 1 TABLET BY MOUTH EVERY DAY  
  
 lisinopril-hydroCHLOROthiazide 20-12.5 mg per tablet Commonly known as:  Joe Posey Take 1 Tab by mouth two (2) times a day. metoprolol succinate 50 mg XL tablet Commonly known as:  TOPROL-XL Take 1 Tab by mouth nightly. NexIUM 20 mg capsule Generic drug:  esomeprazole Take 22.3 mg by mouth as needed. nitroglycerin 0.4 mg SL tablet Commonly known as:  NITROSTAT  
1 tablet by SubLINGual route every five (5) minutes as needed (call 911 if not relieved by 3). VITAMIN D3 2,000 unit Tab Generic drug:  cholecalciferol (vitamin D3) Take  by mouth. ZANTAC 75 75 mg tablet Generic drug:  raNITIdine Take 75 mg by mouth as needed. ZOLOFT 25 mg tablet Generic drug:  sertraline Take 25 mg by mouth daily. Patient Instructions WOUND CARE INSTRUCTIONS 1. Keep the dressing clean and dry and do not remove for 48 hours. 2. Then change the dressing once a day as follows: 
a. Wash hands before and after each dressing change. b. Remove dressing and wash site gently with mild soap and water, rinse, and pat dry. 
c. Apply an ointment (Bacitracin, Polysporin, Neosporin, Petroleum jelly or Aquaphor). d. Apply a non-stick (Telfa) dressing or Band-Aid to cover the wound. Remove pressure bandage on Thursday. You may shower daily at this point, no bandage necessary. Glue will eventually come off within the next 2 weeks. If you still feel rough glue at this point, you may apply vaseline to site daily until fully removed. 3. Watch for: BLEEDING: A small amount of drainage may occur. If bleeding occurs, elevate and rest the surgery site. Apply gauze and steady pressure for 15 minutes. If bleeding continues, call this office. INFECTION: Signs of infection include increased redness, pain, warmth, drainage of pus, and fever. If this occurs, call this office. 4. Special Instructions (follow any that are checked): ·  You have stitches that DO NOT need to be removed. ·  Avoid bending at the waist and heavy lifting for two days. ·  Sleep with your head elevated for the next two nights. ·  Rest the surgery site and keep it elevated as much as possible for two days. ·  You may apply an ice-pack for 10-15 minutes every waking hour for the rest of the day. ·  Eat a soft diet and avoid hot food and hot drinks for the rest of the day. ·  Other instructions: Follow up as directed. Take Tylenol or Ibuprofen for pain as needed. Once the site is healed with no remaining bandages or open areas, protect your surgical site and scar from the sun, as this area will be more sensitive. Use a broad spectrum sunscreen SPF 30 or higher daily, and a chemical free product (one containing zinc oxide or titanium dioxide) is a good choice if the area is sensitive. You may begin to gently massage the surgical site in 2-3 weeks, rubbing in a circular motion along the scar.  This can help reduce swelling and thickness of a scar. A scar cream may be used beginnning 1 month after the surgery. If you have any questions or concerns, please call our office Monday through Friday at [phone]. Introducing [facility] SERVICES! Dear Nayely Higgins: Thank you for requesting a Curves account. Our records indicate that you have previously registered for a Curves account but its currently inactive. Please call our Curves support line at [phone]. Additional Information If you have questions, please visit the Frequently Asked Questions section of the Curves website at [email or web address] Remember, Curves is NOT to be used for urgent needs. For medical emergencies, dial 911. Now available from your iPhone and Android! Please provide this summary of care documentation to your next provider. Your primary care clinician is listed as Emerson Huff. If you have any questions after today's visit, please call [phone].

## 2017-12-26 NOTE — PROGRESS NOTES
Pre-op: Patient presents today for the evaluation of BCC to the L nasal sidewall. Procedure explained with full understanding. Vitals:    12/26/17 0825   BP: 138/70   Pulse: 63   Resp: 16   Temp: 98 °F (36.7 °C)   TempSrc: Oral   SpO2: 98%   Weight: 81.6 kg (180 lb)   Height: 5' 3\" (1.6 m)     preoperatively, will continue to monitor. Post-op: Written and verbal post-op wound care instructions given to patient with full understanding of care. Surgical wound bandaged with Vaseline, Telfa, 2x2 gauze, and coverall tape. All questions and concerns addressed. Vitals stable postoperatively.

## 2018-01-16 ENCOUNTER — OFFICE VISIT (OUTPATIENT)
Dept: CARDIOLOGY CLINIC | Age: 78
End: 2018-01-16

## 2018-01-16 VITALS
OXYGEN SATURATION: 95 % | BODY MASS INDEX: 32.36 KG/M2 | DIASTOLIC BLOOD PRESSURE: 60 MMHG | HEART RATE: 73 BPM | HEIGHT: 63 IN | WEIGHT: 182.6 LBS | SYSTOLIC BLOOD PRESSURE: 102 MMHG

## 2018-01-16 DIAGNOSIS — I10 BENIGN ESSENTIAL HYPERTENSION: Primary | ICD-10-CM

## 2018-01-16 DIAGNOSIS — E78.2 MIXED HYPERLIPIDEMIA: ICD-10-CM

## 2018-01-16 DIAGNOSIS — I48.0 PAF (PAROXYSMAL ATRIAL FIBRILLATION) (HCC): ICD-10-CM

## 2018-01-16 NOTE — PROGRESS NOTES
Mariia Oliveira DNP, ANP-BC  Subjective/HPI:     Billie Anthony is a 78 y.o. female with pmhx CAD remote PTCA CLEMENTE pox LAD 05'12, HTN, HLD, PAF currently maintaining SR who presents to the office for f/u on BP. Since last visit patient is restarted lisinopril hydrochlorothiazide, with improvement in her overall blood pressure. She also reports the daily dosing of metoprolol XL is triggering less fatigue. Patient denies chest pain/ shortness of breath, orthopnea, PND, LE edema, fluttering or palpitations. Overall, she is tolerating current treatment plan. PCP Provider  Concetta Camacho MD  Past Medical History:   Diagnosis Date    CAD (coronary artery disease)     Family history of skin cancer     Hypercholesteremia     Hypertension     Psychiatric disorder     depression    Skin cancer       Past Surgical History:   Procedure Laterality Date    AMB POC MOHS 1 STAGE H/N/HF/G      CARDIAC CATHETERIZATION  5/19/2012         DRUG ELUTING STENT SINGLE VESS  5/19/2012     Dr Stefan Oscar    HX MOHS PROCEDURES  04/13/2017    BCC left superior helical rim by Dr. Ashley Liu  12/26/2017    BCC L nasal sidewall by Dr. Emmie Carrera [Amlodipine] Swelling      Family History   Problem Relation Age of Onset    Coronary Artery Disease Other      daughter stent age 28      Current Outpatient Prescriptions   Medication Sig    lisinopril-hydroCHLOROthiazide (PRINZIDE, ZESTORETIC) 20-12.5 mg per tablet Take 1 Tab by mouth two (2) times a day.  metoprolol succinate (TOPROL-XL) 50 mg XL tablet Take 1 Tab by mouth nightly.  apixaban (ELIQUIS) 5 mg tablet Take 1 Tab by mouth two (2) times a day.  doxazosin (CARDURA) 1 mg tablet TAKE 1 TABLET BY MOUTH EVERY DAY    cyanocobalamin 1,000 mcg tablet Take 1,000 mcg by mouth daily.  LIPITOR 20 mg tablet TAKE 1 TABLET BY MOUTH EVERY DAY    ZOLOFT 25 mg tablet Take 25 mg by mouth daily.     ranitidine (ZANTAC 75) 75 mg tablet Take 75 mg by mouth as needed.  b complex vitamins tablet Take 1 Tab by mouth daily.  esomeprazole (NEXIUM) 20 mg capsule Take 22.3 mg by mouth as needed.  nitroglycerin (NITROSTAT) 0.4 mg SL tablet 1 tablet by SubLINGual route every five (5) minutes as needed (call 911 if not relieved by 3).  omega-3 fatty acids-vitamin e (FISH OIL) 1,000 mg Cap Take 1 Cap by mouth two (2) times a day.  cholecalciferol, vitamin D3, (VITAMIN D3) 2,000 unit Tab Take  by mouth.  aspirin 81 mg chewable tablet Take 1 Tab by mouth daily.  guaiFENesin-codeine (CHERATUSSIN AC) 100-10 mg/5 mL solution Take 5 mL by mouth three (3) times daily as needed for Cough. Max Daily Amount: 15 mL. No current facility-administered medications for this visit. Vitals:    01/16/18 1306 01/16/18 1317   BP: 106/58 102/60   Pulse: 73    SpO2: 95%    Weight: 182 lb 9.6 oz (82.8 kg)    Height: 5' 3\" (1.6 m)      Social History     Social History    Marital status:      Spouse name: N/A    Number of children: N/A    Years of education: N/A     Occupational History    Not on file. Social History Main Topics    Smoking status: Former Smoker     Packs/day: 0.50     Years: 50.00     Quit date: 5/25/2012    Smokeless tobacco: Never Used    Alcohol use Yes      Comment: occasional    Drug use: No    Sexual activity: Not on file     Other Topics Concern    Not on file     Social History Narrative       I have reviewed the nurses notes, vitals, problem list, allergy list, medical history, family, social history and medications. Review of Symptoms:    General: Pt denies excessive weight gain or loss. Pt is able to conduct ADL's  HEENT: Denies blurred vision, headaches, epistaxis and difficulty swallowing. Respiratory: Denies shortness of breath, REYNA, wheezing or stridor.   Cardiovascular: Denies precordial pain, palpitations, edema or PND  Gastrointestinal: Denies poor appetite, indigestion, abdominal pain or blood in stool  Musculoskeletal: Denies pain or swelling from muscles or joints  Neurologic: Denies tremor, paresthesias, or sensory motor disturbance  Skin: Denies rash, itching or texture change. Physical Exam:      General: Well developed, in no acute distress, cooperative and alert  HEENT: No carotid bruits, no JVD, trach is midline. Neck Supple, PEERL, EOM intact. Heart:  Normal S1/S2 negative S3 or S4. Regular, no murmur, gallop or rub.   Respiratory: Clear bilaterally x 4, no wheezing or rales  Abdomen:   Soft, non-tender, no masses, bowel sounds are active.   Extremities:  No edema, normal cap refill, no cyanosis, atraumatic. Neuro: A&Ox3, speech clear, gait stable. Skin: Skin color is normal. No rashes or lesions. Non diaphoretic  Vascular: 2+ pulses symmetric in all extremities    Cardiographics    ECG: Sinus rhythm with left bundle branch block unchanged from previous tracing   Results for orders placed or performed in visit on 10/05/17   CARDIAC HOLTER MONITOR, 24 HOURS    Narrative    ECG Monitor/24 hours, Complete    Reason for Holter Monitor   PALPITATIONS    Heartbeat    Slowest 48  Average 60  Fastest  91      Results:   Underlying Rhythm: Normal sinus rhythm      Atrial Arrhythmias: premature atrial contractions; rare and 4 short runs of symptomatic paroxysmal atrial fibrillation with RVR    AV Conduction: bundle branch block    Ventricular Arrhythmias: premature ventricular contractions; rare     ST Segment Analysis:non-specific changes     Symptom Correlation:  Palpitations correspond to PAF with RVR.       Claudia Vo MD      Results for orders placed or performed during the hospital encounter of 09/12/14   EKG, 12 LEAD, INITIAL   Result Value Ref Range    Ventricular Rate 60 BPM    Atrial Rate 60 BPM    P-R Interval 172 ms    QRS Duration 146 ms    Q-T Interval 470 ms    QTC Calculation (Bezet) 470 ms    Calculated P Axis 12 degrees Calculated R Axis -7 degrees    Calculated T Axis 57 degrees    Diagnosis       Normal sinus rhythm  Left bundle branch block  When compared with ECG of 18-APR-2013 02:38,  No significant change was found  Confirmed by Dannie Frank ([id number]) on 9/12/2014 1:39:12 PM         Cardiology Labs:  Lab Results   Component Value Date/Time    Cholesterol, total 161 10/16/2014 10:38 AM    HDL Cholesterol 85 10/16/2014 10:38 AM    LDL, calculated 65 10/16/2014 10:38 AM    Triglyceride 56 10/16/2014 10:38 AM    CHOL/HDL Ratio 2.7 05/20/2012 03:50 AM       Lab Results   Component Value Date/Time    Sodium 138 10/17/2017 03:12 PM    Potassium 4.0 10/17/2017 03:12 PM    Chloride 94 10/17/2017 03:12 PM    CO2 27 10/17/2017 03:12 PM    Anion gap 9 09/12/2014 01:25 PM    Glucose 89 10/17/2017 03:12 PM    BUN 13 10/17/2017 03:12 PM    Creatinine 0.79 10/17/2017 03:12 PM    BUN/Creatinine ratio 16 10/17/2017 03:12 PM    GFR est AA 85 10/17/2017 03:12 PM    GFR est non-AA 73 10/17/2017 03:12 PM    Calcium 9.7 10/17/2017 03:12 PM    Bilirubin, total 0.6 10/17/2017 03:12 PM    AST (SGOT) 17 10/17/2017 03:12 PM    Alk. phosphatase 93 10/17/2017 03:12 PM    Protein, total 6.9 10/17/2017 03:12 PM    Albumin 4.3 10/17/2017 03:12 PM    Globulin 3.7 09/12/2014 01:25 PM    A-G Ratio 1.7 10/17/2017 03:12 PM    ALT (SGPT) 11 10/17/2017 03:12 PM           Assessment:     Assessment:     Diagnoses and all orders for this visit:    1. Benign essential hypertension  -     AMB POC EKG ROUTINE W/ 12 LEADS, INTER & REP    2. PAF (paroxysmal atrial fibrillation) (HCC)  -     AMB POC EKG ROUTINE W/ 12 LEADS, INTER & REP    3. Mixed hyperlipidemia        ICD-10-CM ICD-9-CM    1. Benign essential hypertension I10 401.1 AMB POC EKG ROUTINE W/ 12 LEADS, INTER & REP   2. PAF (paroxysmal atrial fibrillation) (HCC) I48.0 427.31 AMB POC EKG ROUTINE W/ 12 LEADS, INTER & REP   3.  Mixed hyperlipidemia E78.2 272.2      Orders Placed This Encounter    AMB POC EKG ROUTINE W/ 12 LEADS, INTER & REP     Order Specific Question:   Reason for Exam:     Answer:   ROUTINE        Plan:     1. Paroxysmal atrial fibrillation: Maintaining sinus rhythm with metoprolol XL continue current therapy. Tolerating Eliquis without bruising or bleeding. 2.  Hypertension: Controlled with current regimen (BB, Hctz/Ace-I, Doxazosin). 3.  HLD - Cont ASA, Lipitor, fish oil. Pt has NTG if needed. PCP following lab. Follow-up in 6 months  Leeann Dudley NP    This note was created using voice recognition software. Despite editing, there may be syntax errors.

## 2018-01-16 NOTE — PROGRESS NOTES
Chief Complaint   Patient presents with    Irregular Heart Beat     1. Have you been to the ER, urgent care clinic since your last visit? Hospitalized since your last visit? NO    2. Have you seen or consulted any other health care providers outside of the [facility] since your last visit? Include any pap smears or colon screening.  WAS SEEN BY PCP 12/17

## 2018-01-16 NOTE — MR AVS SNAPSHOT
Lilliana Milian [address] [facility]. 
[phone] Patient: Jani Garcia MRN: [id number] OMF:9/5/1921 Visit Information Date & Time Provider Department Dept. Phone Encounter #  
 1/16/2018  1:15 PM Cheryle Morelle, NP [facility] [phone] Upcoming Health Maintenance Date Due DTaP/Tdap/Td series (1 - Tdap) 5/2/1963 ZOSTER VACCINE AGE 60> 3/2/2002 GLAUCOMA SCREENING Q2Y 5/2/2007 OSTEOPOROSIS SCREENING (DEXA) 5/2/2007 Pneumococcal 65+ Low/Medium Risk (1 of 2 - PCV13) 5/2/2007 MEDICARE YEARLY EXAM 5/2/2007 Influenza Age 5 to Adult 8/1/2017 Allergies as of 1/16/2018  Review Complete On: 1/16/2018 By: Cheryle Morelle, NP Severity Noted Reaction Type Reactions Norvasc [Amlodipine]  12/02/2014    Swelling Current Immunizations  Reviewed on 9/21/2012 Name Date Influenza Vaccine Split 11/21/2011 Not reviewed this visit You Were Diagnosed With   
  
 Codes Comments Benign essential hypertension    -  Primary ICD-10-CM: I10 
ICD-9-CM: 401.1 PAF (paroxysmal atrial fibrillation) (HCC)     ICD-10-CM: I48.0 ICD-9-CM: 427.31 Mixed hyperlipidemia     ICD-10-CM: E78.2 ICD-9-CM: 272.2 Vitals BP Pulse Height(growth percentile) Weight(growth percentile) SpO2 BMI  
 102/60 73 5' 3\" (1.6 m) 182 lb 9.6 oz (82.8 kg) 95% 32.35 kg/m2 OB Status Smoking Status Postmenopausal Former Smoker Vitals History BMI and BSA Data Body Mass Index Body Surface Area  
 32.35 kg/m 2 1.92 m 2 Preferred Pharmacy Pharmacy Name Phone [id number] [address] [phone] Your Updated Medication List  
  
   
This list is accurate as of: 1/16/18  1:43 PM.  Always use your most recent med list.  
  
  
  
  
 apixaban 5 mg tablet Commonly known as:  Chuy Pore Take 1 Tab by mouth two (2) times a day. aspirin 81 mg chewable tablet Take 1 Tab by mouth daily. b complex vitamins tablet Take 1 Tab by mouth daily. cyanocobalamin 1,000 mcg tablet Take 1,000 mcg by mouth daily. doxazosin 1 mg tablet Commonly known as:  CARDURA TAKE 1 TABLET BY MOUTH EVERY DAY  
  
 FISH OIL 1,000 mg Cap Generic drug:  omega-3 fatty acids-vitamin e Take 1 Cap by mouth two (2) times a day. guaiFENesin-codeine 100-10 mg/5 mL solution Commonly known as:  Suszanne Minor Take 5 mL by mouth three (3) times daily as needed for Cough. Max Daily Amount: 15 mL. LIPITOR 20 mg tablet Generic drug:  atorvastatin TAKE 1 TABLET BY MOUTH EVERY DAY  
  
 lisinopril-hydroCHLOROthiazide 20-12.5 mg per tablet Commonly known as:  Charlaine Kohut Take 1 Tab by mouth two (2) times a day. metoprolol succinate 50 mg XL tablet Commonly known as:  TOPROL-XL Take 1 Tab by mouth nightly. NexIUM 20 mg capsule Generic drug:  esomeprazole Take 22.3 mg by mouth as needed. nitroglycerin 0.4 mg SL tablet Commonly known as:  NITROSTAT  
1 tablet by SubLINGual route every five (5) minutes as needed (call 911 if not relieved by 3). VITAMIN D3 2,000 unit Tab Generic drug:  cholecalciferol (vitamin D3) Take  by mouth. ZANTAC 75 75 mg tablet Generic drug:  raNITIdine Take 75 mg by mouth as needed. ZOLOFT 25 mg tablet Generic drug:  sertraline Take 25 mg by mouth daily. We Performed the Following AMB POC EKG ROUTINE W/ 12 LEADS, INTER & REP [39319 CPT(R)] Introducing [facility]! Dear Antoine Peña: Thank you for requesting a WebPesados account. Our records indicate that you have previously registered for a WebPesados account but its currently inactive. Please call our WebPesados support line at [phone]. Additional Information If you have questions, please visit the Frequently Asked Questions section of the [employer] website at [email or web address] Remember, Edicy is NOT to be used for urgent needs. For medical emergencies, dial 911. Now available from your iPhone and Android! Please provide this summary of care documentation to your next provider. Your primary care clinician is listed as Sierra Bhatti. If you have any questions after today's visit, please call [phone].

## 2018-02-06 RX ORDER — APIXABAN 5 MG/1
TABLET, FILM COATED ORAL
Qty: 60 TAB | Refills: 3 | Status: SHIPPED | OUTPATIENT
Start: 2018-02-06 | End: 2018-05-30 | Stop reason: SDUPTHER

## 2018-05-02 RX ORDER — DOXAZOSIN 1 MG/1
TABLET ORAL
Qty: 90 TAB | Refills: 2 | Status: SHIPPED | OUTPATIENT
Start: 2018-05-02 | End: 2019-02-06 | Stop reason: SDUPTHER

## 2018-05-30 RX ORDER — APIXABAN 5 MG/1
TABLET, FILM COATED ORAL
Qty: 60 TAB | Refills: 3 | Status: SHIPPED | OUTPATIENT
Start: 2018-05-30 | End: 2018-09-19 | Stop reason: SDUPTHER

## 2018-07-19 ENCOUNTER — OFFICE VISIT (OUTPATIENT)
Dept: CARDIOLOGY CLINIC | Age: 78
End: 2018-07-19

## 2018-07-19 VITALS
OXYGEN SATURATION: 97 % | BODY MASS INDEX: 32.92 KG/M2 | HEIGHT: 63 IN | DIASTOLIC BLOOD PRESSURE: 70 MMHG | WEIGHT: 185.8 LBS | RESPIRATION RATE: 16 BRPM | SYSTOLIC BLOOD PRESSURE: 120 MMHG | HEART RATE: 60 BPM

## 2018-07-19 DIAGNOSIS — I10 BENIGN ESSENTIAL HYPERTENSION: ICD-10-CM

## 2018-07-19 DIAGNOSIS — I25.10 ASHD (ARTERIOSCLEROTIC HEART DISEASE): ICD-10-CM

## 2018-07-19 DIAGNOSIS — Z98.61 S/P PTCA (PERCUTANEOUS TRANSLUMINAL CORONARY ANGIOPLASTY): ICD-10-CM

## 2018-07-19 DIAGNOSIS — I48.0 PAF (PAROXYSMAL ATRIAL FIBRILLATION) (HCC): Primary | ICD-10-CM

## 2018-07-19 DIAGNOSIS — E78.2 MIXED HYPERLIPIDEMIA: ICD-10-CM

## 2018-07-19 DIAGNOSIS — I44.7 LEFT BUNDLE-BRANCH BLOCK: ICD-10-CM

## 2018-07-19 RX ORDER — BACLOFEN 20 MG
TABLET ORAL
COMMUNITY
End: 2021-08-03

## 2018-07-19 NOTE — MR AVS SNAPSHOT
[address] [employer] 
[phone] Patient: Gene Zayas MRN: [id number]:[id number] Visit Information Date & Time Provider Department Dept. Phone Encounter #  
 7/19/2018  9:45 AM Celina Monte, [address] [place] [address] [facility] [phone] [id number] Follow-up Instructions Routing History Follow-up and Disposition History Your Appointments 8/13/2018  8:00 AM  
ECHO CARDIOGRAMS 2D with ECHO, [facility] Appt Note: $15CP 7/19/18ksr / per Dr W/2D ECHO COMPLETE ADULT (TTE) W OR WO CONTR [56766 CPT(R)]  
 [id number] [facility]  
[phone] [address] [facility] Upcoming Health Maintenance Date Due DTaP/Tdap/Td series (1 - Tdap) 5/2/1963 ZOSTER VACCINE AGE 60> 3/2/2002 GLAUCOMA SCREENING Q2Y 5/2/2007 Bone Densitometry (Dexa) Screening 5/2/2007 Pneumococcal 65+ Low/Medium Risk (1 of 2 - PCV13) 5/2/2007 MEDICARE YEARLY EXAM 3/14/2018 Influenza Age 5 to Adult 8/1/2018 Allergies as of 7/19/2018  Review Complete On: 7/19/2018 By: Celina Monte MD  
  
 Severity Noted Reaction Type Reactions Norvasc [Amlodipine]  12/02/2014    Swelling Current Immunizations  Reviewed on 9/21/2012 Name Date Influenza Vaccine Split 11/21/2011 Not reviewed this visit You Were Diagnosed With   
  
 Codes Comments PAF (paroxysmal [facility] fibrillation) ([facility]    -  Primary ICD-10-CM: I48.0 ICD-9-CM: 427.31 Benign essential hypertension     ICD-10-CM: I10 
ICD-9-CM: 401.1 ASHD (arteriosclerotic heart disease)     ICD-10-CM: I25.10 ICD-9-CM: 414.00 S/P PTCA (percutaneous transluminal coronary angioplasty)     ICD-10-CM: Z98.61 ICD-9-CM: V45.82 Mixed hyperlipidemia     ICD-10-CM: E78.2 ICD-9-CM: 272.2 Left bundle-branch block     ICD-10-CM: I44.7 ICD-9-CM: 426. 3 Vitals BP Pulse Resp Height(growth percentile) Weight(growth percentile) SpO2  
 120/70 (BP 1 Location: Right arm, BP Patient Position: Sitting) 60 16 5' 3\" (1.6 m) 185 lb 12.8 oz (84.3 kg) 97% BMI OB Status Smoking Status 32.91 kg/m2 Postmenopausal Former Smoker Vitals History BMI and BSA Data Body Mass Index Body Surface Area  
 32.91 kg/m 2 1.94 m 2 Preferred Pharmacy Pharmacy Name Phone [id number] [phone] Your Updated Medication List  
  
   
This list is accurate as of 7/19/18 10:47 AM.  Always use your most recent med list.  
  
  
  
  
 aspirin 81 mg chewable tablet Take 1 Tab by mouth daily. atorvastatin 20 mg tablet Commonly known as:  LIPITOR Take 1 Tab by mouth daily. Refills per Dr. Juanjo Khan who is doing labs, please  
  
 b complex vitamins tablet Take 1 Tab by mouth daily. cyanocobalamin 1,000 mcg tablet Take 1,000 mcg by mouth daily. doxazosin 1 mg tablet Commonly known as:  CARDURA TAKE 1 TABLET BY MOUTH EVERY DAY  
  
 ELIQUIS 5 mg tablet Generic drug:  apixaban TAKE 1 TAB BY MOUTH TWO (2) TIMES A DAY. FISH OIL 1,000 mg Cap Generic drug:  omega-3 fatty acids-vitamin e Take 1 Cap by mouth two (2) times a day. lisinopril-hydroCHLOROthiazide 20-12.5 mg per tablet Commonly known as:  Donnamarie Parrot Take 1 Tab by mouth two (2) times a day. magnesium oxide 500 mg Tab Take  by mouth.  
  
 metoprolol succinate 50 mg XL tablet Commonly known as:  TOPROL-XL Take 1 Tab by mouth nightly. NexIUM 20 mg capsule Generic drug:  esomeprazole Take 22.3 mg by mouth as needed. nitroglycerin 0.4 mg SL tablet Commonly known as:  NITROSTAT  
1 tablet by SubLINGual route every five (5) minutes as needed (call 911 if not relieved by 3). VITAMIN D3 2,000 unit Tab Generic drug:  cholecalciferol (vitamin D3) Take  by mouth. ZOLOFT 25 mg tablet Generic drug:  sertraline Take 25 mg by mouth daily. We Performed the Following AMB POC EKG ROUTINE W/ 12 LEADS, INTER & REP [52081 CPT(R)] To-Do List   
 07/19/2018 ECHO:  2D ECHO COMPLETE ADULT (TTE) W OR WO CONTR Introducing [facility]! [facility] introduces Five Below patient portal. Now you can access parts of your medical record, email your doctor's office, and request medication refills online. 1. In your internet browser, go to [email or web address] 2. Click on the First Time User? Click Here link in the Sign In box. You will see the New Member Sign Up page. 3. Enter your Five Below Access Code exactly as it appears below. You will not need to use this code after youve completed the sign-up process. If you do not sign up before the expiration date, you must request a new code. · Five Below Access Code: [email or web address] Expires: 10/17/2018  9:13 AM 
 
4. Enter the last four digits of your Social Security Number (xxxx) and Date of Birth (mm/dd/yyyy) as indicated and click Submit. You will be taken to the next sign-up page. 5. Create a Five Below ID. This will be your Five Below login ID and cannot be changed, so think of one that is secure and easy to remember. 6. Create a Five Below password. You can change your password at any time. 7. Enter your Password Reset Question and Answer. This can be used at a later time if you forget your password. 8. Enter your e-mail address. You will receive e-mail notification when new information is available in [address] 9. Click Sign Up. You can now view and download portions of your medical record. 10. Click the Download Summary menu link to download a portable copy of your medical information.  
 
If you have questions, please visit the Frequently Asked Questions section of the [facility]. Remember, [employer] is NOT to be used for urgent needs. For medical emergencies, dial 911. Now available from your iPhone and Android! Please provide this summary of care documentation to your next provider. Your primary care clinician is listed as April Brizuela. If you have any questions after today's visit, please call [phone].

## 2018-07-19 NOTE — PROGRESS NOTES
1. Have you been to the ER, urgent care clinic since your last visit? Hospitalized since your last visit? No    2. Have you seen or consulted any other health care providers outside of the [address] [place] [address] since your last visit? Include any pap smears or colon screening. Yes Dermatology & PCP    Patient C/O fatigue and irregular heart beats.

## 2018-07-19 NOTE — PROGRESS NOTES
[address] [place] [address], [address] [facility]  [phone]     Subjective:      Yvette Henry is a 70 y.o. female is here for routine f/u. Reports occasional palpitation, unchanged mild REYNA, unchanged lack of energy. Unable to do much d/t limiting back pain. Denies chest pain orthopnea, PND, LE edema, palpitations, syncope, or presyncope. Patient Active Problem List    Diagnosis Date Noted    PA[facility]ox[facility]mal [facility] fibrillation) ([facility] 10/12/2017    Benign essential hypertension 05/19/2012    Hyperlipidemia 05/19/2012    Depression 05/19/2012    Unstable angina pectoris ([facility] 05/19/2012    Left bundle-branch block 05/19/2012    CAD (coronary artery disease) 05/19/2012    S/P PTCA (percutaneous transluminal coronary angioplasty) 05/19/2012      Ubaldo Taylor MD  Past Medical History:   Diagnosis Date    CAD (coronary artery disease)     Family history of skin cancer     Hypercholesteremia     Hypertension     Psychiatric disorder     depression    Skin cancer       Past Surgical History:   Procedure Laterality Date    AMB POC MOHS 1 STAGE H/N/HF/G      CARDIAC CATHETERIZATION  5/19/2012         DRUG ELUTING STENT SINGLE VESS  5/19/2012     Dr Michelle Ramon    HX MOHS PROCEDURES  04/13/2017    BCC left superior helical rim by Dr. Veda Littlejohn  12/26/2017    BCC L nasal sidewall by Dr. Elana Saint [Amlodipine] Swelling      Family History   Problem Relation Age of Onset    Coronary Artery Disease Other      daughter stent age 28      Social History     Social History    Marital status:      Spouse name: N/A    Number of children: N/A    Years of education: N/A     Occupational History    Not on file.      Social History Main Topics    Smoking status: Former Smoker     Packs/day: 0.50     Years: 50.00     Quit date: 5/25/2012    Smokeless tobacco: Never Used    Alcohol use Yes      Comment: occasional    Drug use: No    Sexual activity: Not on file     Other Topics Concern    Not on file     Social History Narrative      Current Outpatient Prescriptions   Medication Sig    magnesium oxide 500 mg tab Take  by mouth.  ELIQUIS 5 mg tablet TAKE 1 TAB BY MOUTH TWO (2) TIMES A DAY.  doxazosin (CARDURA) 1 mg tablet TAKE 1 TABLET BY MOUTH EVERY DAY    atorvastatin (LIPITOR) 20 mg tablet Take 1 Tab by mouth daily. Refills per Dr. Bam Johnson who is doing labs, please    lisinopril-hydroCHLOROthiazide (PRINZIDE, ZESTORETIC) 20-12.5 mg per tablet Take 1 Tab by mouth two (2) times a day.  metoprolol succinate (TOPROL-XL) 50 mg XL tablet Take 1 Tab by mouth nightly.  cyanocobalamin 1,000 mcg tablet Take 1,000 mcg by mouth daily.  ZOLOFT 25 mg tablet Take 25 mg by mouth daily.  b complex vitamins tablet Take 1 Tab by mouth daily.  esomeprazole (NEXIUM) 20 mg capsule Take 22.3 mg by mouth as needed.  nitroglycerin (NITROSTAT) 0.4 mg SL tablet 1 tablet by SubLINGual route every five (5) minutes as needed (call 911 if not relieved by 3).  omega-3 fatty acids-vitamin e (FISH OIL) 1,000 mg Cap Take 1 Cap by mouth two (2) times a day.  cholecalciferol, vitamin D3, (VITAMIN D3) 2,000 unit Tab Take  by mouth.  aspirin 81 mg chewable tablet Take 1 Tab by mouth daily. No current facility-administered medications for this visit. Review of Symptoms:  11 systems reviewed, negative other than as stated in the HPI    Physical ExamPhysical Exam:    Vitals:    07/19/18 0941 07/19/18 0942   BP: 110/70 120/70   Pulse: 60    Resp: 16    SpO2: 97%    Weight: 185 lb 12.8 oz (84.3 kg)    Height: 5' 3\" (1.6 m)      Body mass index is 32.91 kg/(m^2). General PE   Gen:  NAD  Mental Status - Alert. General Appearance - Not in acute distress. Chest and Lung Exam   Inspection: Accessory muscles - No use of accessory muscles in breathing.    Auscultation:   Breath sounds: - Normal. Cardiovascular   Inspection: Jugular vein - Bilateral - Inspection Normal.   Palpation/Percussion:   Apical Impulse: - Normal.   Auscultation: Rhythm - Regular. Heart Sounds - S1 WNL and S2 WNL. No S3 or S4. Murmurs & Other Heart Sounds: Auscultation of the heart reveals - No Murmurs. Peripheral Vascular   Upper Extremity: Inspection - Bilateral - No Cyanotic nailbeds or Digital clubbing. Lower Extremity:   Palpation: Edema - Bilateral - No edema. Abdomen:   Soft, non-tender, bowel sounds are active.   Neuro: A&O times 3, CN and motor grossly WNL    Labs:   Lab Results   Component Value Date/Time    Cholesterol, total 161 10/16/2014 10:38 AM    Cholesterol, total 168 09/17/2013 12:00 AM    Cholesterol, total 172 01/17/2013 10:41 AM    Cholesterol, total 164 09/05/2012 10:30 AM    Cholesterol, total 131 05/20/2012 03:50 AM    HDL Cholesterol 85 10/16/2014 10:38 AM    HDL Cholesterol 82 09/17/2013 12:00 AM    HDL Cholesterol 84 01/17/2013 10:41 AM    HDL Cholesterol 65 09/05/2012 10:30 AM    HDL Cholesterol 48 05/20/2012 03:50 AM    LDL, calculated 65 10/16/2014 10:38 AM    LDL, calculated 70 09/17/2013 12:00 AM    LDL, calculated 66 01/17/2013 10:41 AM    LDL, calculated 66 09/05/2012 10:30 AM    LDL, calculated 46.8 05/20/2012 03:50 AM    Triglyceride 56 10/16/2014 10:38 AM    Triglyceride 79 09/17/2013 12:00 AM    Triglyceride 111 01/17/2013 10:41 AM    Triglyceride 167 (H) 09/05/2012 10:30 AM    Triglyceride 181 (H) 05/20/2012 03:50 AM    CHOL/HDL Ratio 2.7 05/20/2012 03:50 AM     Lab Results   Component Value Date/Time    CK 77 04/18/2013 02:41 AM     Lab Results   Component Value Date/Time    Sodium 138 10/17/2017 03:12 PM    Potassium 4.0 10/17/2017 03:12 PM    Chloride 94 (L) 10/17/2017 03:12 PM    CO2 27 10/17/2017 03:12 PM    Anion gap 9 09/12/2014 01:25 PM    Glucose 89 10/17/2017 03:12 PM    BUN 13 10/17/2017 03:12 PM    Creatinine 0.79 10/17/2017 03:12 PM    BUN/Creatinine ratio 16 10/17/2017 03:12 PM    GFR est AA 85 10/17/2017 03:12 PM    GFR est non-AA 73 10/17/2017 03:12 PM    Calcium 9.7 10/17/2017 03:12 PM    Bilirubin, total 0.6 10/17/2017 03:12 PM    AST (SGOT) 17 10/17/2017 03:12 PM    Alk. phosphatase 93 10/17/2017 03:12 PM    Protein, total 6.9 10/17/2017 03:12 PM    Albumin 4.3 10/17/2017 03:12 PM    Globulin 3.7 09/12/2014 01:25 PM    A-G Ratio 1.7 10/17/2017 03:12 PM    ALT (SGPT) 11 10/17/2017 03:12 PM       EKG:  NSR, LBBB     Assessment:        1. PAF (paroxysmal atrial fibrillation) ([address]    2. Benign essential hypertension    3. ASHD (arteriosclerotic heart disease)    4. S/P PTCA (percutaneous transluminal coronary angioplasty)    5. Mixed hyperlipidemia    6. Left bundle-branch block        Orders Placed This Encounter    AMB POC EKG ROUTINE W/ 12 LEADS, INTER & REP     Order Specific Question:   Reason for Exam:     Answer:   routine    2D ECHO COMPLETE ADULT (TTE) W OR WO CONTR     Standing Status:   Future     Standing Expiration Date:   1/16/2019     Order Specific Question:   Reason for Exam:     Answer:   HF    magnesium oxide 500 mg tab     Sig: Take  by mouth. Plan:     Pt presents for 6 mos f/u    ASHD, remote PTCA CLEMENTE pox LAD 05'12  Negative nuclear stress test in 12/14  Mild unchanged REYNA. Stable. Continue ASA, BB, statin    PAF  Holter 10/17: Palpitations correspond to PAF with RVR. Occasional palpitations, mild fatigue when HR is \"irregular\"  Only one episode in the last few months that she can recall. Maintaining sinus rhythm with metoprolol XL continue current therapy. Tolerating Eliquis without bruising or bleeding. Check echo, none in years to r/o structural heart disease     Hypertension  Controlled with current regimen (BB, Hctz/Ace-I, Doxazosin). HLD   On statin. Lipids and labs followed by PCP. LDL 64 byPCP 12/2017. D/t limiting back pain, she is unable to do exercise therapy. Has seen back specialist years ago.   Advised consider ortho appointment and or PT. Continue current care and f/u in 12 months.     Radha Contreras MD

## 2018-08-13 ENCOUNTER — CLINICAL SUPPORT (OUTPATIENT)
Dept: CARDIOLOGY CLINIC | Age: 78
End: 2018-08-13

## 2018-08-13 DIAGNOSIS — I44.7 LEFT BUNDLE-BRANCH BLOCK: ICD-10-CM

## 2018-08-13 DIAGNOSIS — E78.2 MIXED HYPERLIPIDEMIA: ICD-10-CM

## 2018-08-13 DIAGNOSIS — Z98.61 S/P PTCA (PERCUTANEOUS TRANSLUMINAL CORONARY ANGIOPLASTY): ICD-10-CM

## 2018-08-13 DIAGNOSIS — I25.10 ASHD (ARTERIOSCLEROTIC HEART DISEASE): ICD-10-CM

## 2018-08-13 DIAGNOSIS — I10 BENIGN ESSENTIAL HYPERTENSION: ICD-10-CM

## 2018-08-13 DIAGNOSIS — I48.0 PAF (PAROXYSMAL ATRIAL FIBRILLATION) (HCC): ICD-10-CM

## 2018-09-19 RX ORDER — APIXABAN 5 MG/1
TABLET, FILM COATED ORAL
Qty: 60 TAB | Refills: 3 | Status: SHIPPED | OUTPATIENT
Start: 2018-09-19 | End: 2019-01-23 | Stop reason: SDUPTHER

## 2018-10-24 RX ORDER — METOPROLOL SUCCINATE 50 MG/1
50 TABLET, EXTENDED RELEASE ORAL
Qty: 90 TAB | Refills: 3 | Status: SHIPPED | OUTPATIENT
Start: 2018-10-24 | End: 2019-10-20 | Stop reason: SDUPTHER

## 2018-11-19 RX ORDER — LISINOPRIL AND HYDROCHLOROTHIAZIDE 12.5; 2 MG/1; MG/1
1 TABLET ORAL 2 TIMES DAILY
Qty: 180 TAB | Refills: 2 | Status: SHIPPED | OUTPATIENT
Start: 2018-11-19 | End: 2019-08-01 | Stop reason: SDUPTHER

## 2019-01-23 ENCOUNTER — APPOINTMENT (OUTPATIENT)
Dept: PHYSICAL THERAPY | Age: 79
End: 2019-01-23

## 2019-01-23 RX ORDER — APIXABAN 5 MG/1
TABLET, FILM COATED ORAL
Qty: 60 TAB | Refills: 3 | Status: SHIPPED | OUTPATIENT
Start: 2019-01-23 | End: 2019-05-15 | Stop reason: SDUPTHER

## 2019-02-06 RX ORDER — DOXAZOSIN 1 MG/1
TABLET ORAL
Qty: 90 TAB | Refills: 2 | Status: SHIPPED | OUTPATIENT
Start: 2019-02-06 | End: 2019-10-31 | Stop reason: SDUPTHER

## 2019-04-24 ENCOUNTER — HOSPITAL ENCOUNTER (OUTPATIENT)
Dept: GENERAL RADIOLOGY | Age: 79
Discharge: HOME | End: 2019-04-24
Attending: PHYSICIAN ASSISTANT
Payer: MEDICARE

## 2019-04-24 DIAGNOSIS — R13.10 DYSPHAGIA: ICD-10-CM

## 2019-04-24 DIAGNOSIS — Z83.71 FAMILY HISTORY OF COLONIC POLYPS: ICD-10-CM

## 2019-04-24 DIAGNOSIS — K21.9 GASTROESOPHAGEAL REFLUX DISEASE: ICD-10-CM

## 2019-04-24 DIAGNOSIS — Z01.89 OTHER LABORATORY EXAMINATION: ICD-10-CM

## 2019-04-24 DIAGNOSIS — K59.04 CHRONIC IDIOPATHIC CONSTIPATION: ICD-10-CM

## 2019-04-24 PROCEDURE — 74230 X-RAY XM SWLNG FUNCJ C+: CPT

## 2019-04-24 PROCEDURE — 92611 MOTION FLUOROSCOPY/SWALLOW: CPT

## 2019-04-24 NOTE — PROGRESS NOTES
[facility], [address] [place] [address]    Speech Pathology Modified barium swallow Study  Patient: Juan Manuel Joaquin (53 y.o. female)  Date: 4/24/2019  Referring Provider: ZANDER Chao    SUBJECTIVE:   Patient ambulatory to study. She reports she has a dry cough upon waking in the morning. She does not feel like she has a cough when she eats/drinks. She has a history of GERD but has stopped taking Nexium. OBJECTIVE:   Past Medical History:   Past Medical History:   Diagnosis Date    CAD (coronary artery disease)     Family history of skin cancer     Hypercholesteremia     Hypertension     Psychiatric disorder     depression    Skin cancer      Past Surgical History:   Procedure Laterality Date    AMB POC MOHS 1 STAGE H/N/HF/G      CARDIAC CATHETERIZATION  5/19/2012         DRUG ELUTING STENT SINGLE VESS  5/19/2012     Dr Jorge Wetzel    HX MOHS PROCEDURES  04/13/2017    BCC left superior helical rim by Dr. Yelena Carlson  12/26/2017    BCC L nasal sidewall by Dr. Dawson Yap      Current Dietary Status:  Regular/thin  Radiologist: (Dr. Nolvia Frausto)  Film Views: Lateral;Fluoro  Patient Position: (standing)    Trial 1:   Consistency Presented: Thin liquid; Solid;Puree   How Presented: Successive swallows;Cup/sip; Self-fed/presented;Spoon       Bolus Acceptance: No impairment   Bolus Formation/Control: No impairment:     Propulsion: No impairment   Oral Residue: None   Initiation of Swallow: No impairment   Timing: No impairment   Penetration: None   Aspiration/Timing: No evidence of aspiration   Pharyngeal Clearance: No residue                       Decreased Tongue Base Retraction?: No  Laryngeal Elevation: WFL (within functional limits)  Aspiration/Penetration Score: 1 (No penetration or aspiration-Contrast does not enter the airway)  Pharyngeal Symmetry: Symmetrical  Pharyngeal-Esophageal Segment: No impairment  Pharyngeal Dysfunction: None    Oral Phase Severity: No impairment  Pharyngeal Phase Severity: N/A    ASSESSMENT :  Based on the objective data described above, the patient presents with no oral or pharyngeal dysphagia. Timely and complete mastication. pharyngeal swallow initiation is timely, occurring at the base of tongue with liquids and vallecula with solids. Complete airway protection with no penetration/aspiration. No pharyngeal residue after any consistency. PLAN/RECOMMENDATIONS :  Diet as tolerated     COMMUNICATION/EDUCATION:   The above findings and recommendations were discussed with: patient who verbalized understanding.     Thank you for this referral.  JASMEET Balbuena  Time Calculation: 19 mins

## 2019-05-08 ENCOUNTER — HOSPITAL ENCOUNTER (OUTPATIENT)
Dept: GENERAL RADIOLOGY | Age: 79
Discharge: HOME | End: 2019-05-08
Payer: MEDICARE

## 2019-05-08 DIAGNOSIS — K59.04 CHRONIC IDIOPATHIC CONSTIPATION: ICD-10-CM

## 2019-05-08 PROCEDURE — 74018 RADEX ABDOMEN 1 VIEW: CPT

## 2019-05-15 RX ORDER — APIXABAN 5 MG/1
TABLET, FILM COATED ORAL
Qty: 60 TAB | Refills: 3 | Status: SHIPPED | OUTPATIENT
Start: 2019-05-15 | End: 2019-09-02 | Stop reason: SDUPTHER

## 2019-06-19 ENCOUNTER — ANESTHESIA (OUTPATIENT)
Dept: ENDOSCOPY | Age: 79
End: 2019-06-19
Payer: MEDICARE

## 2019-06-19 ENCOUNTER — HOSPITAL ENCOUNTER (OUTPATIENT)
Age: 79
Setting detail: OUTPATIENT SURGERY
Discharge: HOME | End: 2019-06-19
Attending: INTERNAL MEDICINE | Admitting: INTERNAL MEDICINE
Payer: MEDICARE

## 2019-06-19 ENCOUNTER — ANESTHESIA EVENT (OUTPATIENT)
Dept: ENDOSCOPY | Age: 79
End: 2019-06-19
Payer: MEDICARE

## 2019-06-19 VITALS
WEIGHT: 183 LBS | RESPIRATION RATE: 16 BRPM | HEIGHT: 62 IN | TEMPERATURE: 97.6 F | BODY MASS INDEX: 33.68 KG/M2 | HEART RATE: 73 BPM | DIASTOLIC BLOOD PRESSURE: 82 MMHG | SYSTOLIC BLOOD PRESSURE: 191 MMHG | OXYGEN SATURATION: 99 %

## 2019-06-19 PROCEDURE — 76060000031 HC ANESTHESIA FIRST 0.5 HR: Performed by: INTERNAL MEDICINE

## 2019-06-19 PROCEDURE — 74011000250 HC RX REV CODE- 250

## 2019-06-19 PROCEDURE — 88305 TISSUE EXAM BY PATHOLOGIST: CPT

## 2019-06-19 PROCEDURE — 77030019988 HC FCPS ENDOSC DISP BSC -B: Performed by: INTERNAL MEDICINE

## 2019-06-19 PROCEDURE — 74011250637 HC RX REV CODE- 250/637: Performed by: INTERNAL MEDICINE

## 2019-06-19 PROCEDURE — 74011250636 HC RX REV CODE- 250/636: Performed by: INTERNAL MEDICINE

## 2019-06-19 PROCEDURE — 74011250636 HC RX REV CODE- 250/636

## 2019-06-19 PROCEDURE — 76040000019: Performed by: INTERNAL MEDICINE

## 2019-06-19 RX ORDER — PROPOFOL 10 MG/ML
INJECTION, EMULSION INTRAVENOUS AS NEEDED
Status: DISCONTINUED | OUTPATIENT
Start: 2019-06-19 | End: 2019-06-19 | Stop reason: HOSPADM

## 2019-06-19 RX ORDER — SODIUM CHLORIDE 0.9 % (FLUSH) 0.9 %
5-40 SYRINGE (ML) INJECTION AS NEEDED
Status: DISCONTINUED | OUTPATIENT
Start: 2019-06-19 | End: 2019-06-19 | Stop reason: HOSPADM

## 2019-06-19 RX ORDER — ATROPINE SULFATE 0.1 MG/ML
0.5 INJECTION INTRAVENOUS
Status: DISCONTINUED | OUTPATIENT
Start: 2019-06-19 | End: 2019-06-19 | Stop reason: HOSPADM

## 2019-06-19 RX ORDER — EPINEPHRINE 0.1 MG/ML
1 INJECTION INTRACARDIAC; INTRAVENOUS
Status: DISCONTINUED | OUTPATIENT
Start: 2019-06-19 | End: 2019-06-19 | Stop reason: HOSPADM

## 2019-06-19 RX ORDER — SODIUM CHLORIDE 0.9 % (FLUSH) 0.9 %
5-40 SYRINGE (ML) INJECTION EVERY 8 HOURS
Status: DISCONTINUED | OUTPATIENT
Start: 2019-06-19 | End: 2019-06-19 | Stop reason: HOSPADM

## 2019-06-19 RX ORDER — LIDOCAINE HYDROCHLORIDE 20 MG/ML
INJECTION, SOLUTION EPIDURAL; INFILTRATION; INTRACAUDAL; PERINEURAL AS NEEDED
Status: DISCONTINUED | OUTPATIENT
Start: 2019-06-19 | End: 2019-06-19 | Stop reason: HOSPADM

## 2019-06-19 RX ORDER — FLUMAZENIL 0.1 MG/ML
0.2 INJECTION INTRAVENOUS
Status: DISCONTINUED | OUTPATIENT
Start: 2019-06-19 | End: 2019-06-19 | Stop reason: HOSPADM

## 2019-06-19 RX ORDER — NALOXONE HYDROCHLORIDE 0.4 MG/ML
0.4 INJECTION, SOLUTION INTRAMUSCULAR; INTRAVENOUS; SUBCUTANEOUS
Status: DISCONTINUED | OUTPATIENT
Start: 2019-06-19 | End: 2019-06-19 | Stop reason: HOSPADM

## 2019-06-19 RX ORDER — MIDAZOLAM HYDROCHLORIDE 1 MG/ML
.25-5 INJECTION, SOLUTION INTRAMUSCULAR; INTRAVENOUS
Status: DISCONTINUED | OUTPATIENT
Start: 2019-06-19 | End: 2019-06-19 | Stop reason: HOSPADM

## 2019-06-19 RX ORDER — GLYCOPYRROLATE 0.2 MG/ML
INJECTION INTRAMUSCULAR; INTRAVENOUS AS NEEDED
Status: DISCONTINUED | OUTPATIENT
Start: 2019-06-19 | End: 2019-06-19 | Stop reason: HOSPADM

## 2019-06-19 RX ORDER — SODIUM CHLORIDE 9 MG/ML
75 INJECTION, SOLUTION INTRAVENOUS CONTINUOUS
Status: DISCONTINUED | OUTPATIENT
Start: 2019-06-19 | End: 2019-06-19 | Stop reason: HOSPADM

## 2019-06-19 RX ORDER — DEXTROMETHORPHAN/PSEUDOEPHED 2.5-7.5/.8
1.2 DROPS ORAL
Status: DISCONTINUED | OUTPATIENT
Start: 2019-06-19 | End: 2019-06-19 | Stop reason: HOSPADM

## 2019-06-19 RX ADMIN — PROPOFOL 50 MG: 10 INJECTION, EMULSION INTRAVENOUS at 12:05

## 2019-06-19 RX ADMIN — PROPOFOL 30 MG: 10 INJECTION, EMULSION INTRAVENOUS at 12:02

## 2019-06-19 RX ADMIN — GLYCOPYRROLATE 0.2 MG: 0.2 INJECTION INTRAMUSCULAR; INTRAVENOUS at 11:47

## 2019-06-19 RX ADMIN — LIDOCAINE HYDROCHLORIDE 100 MG: 20 INJECTION, SOLUTION EPIDURAL; INFILTRATION; INTRACAUDAL; PERINEURAL at 11:50

## 2019-06-19 RX ADMIN — SODIUM CHLORIDE: 900 INJECTION, SOLUTION INTRAVENOUS at 11:42

## 2019-06-19 RX ADMIN — PROPOFOL 50 MG: 10 INJECTION, EMULSION INTRAVENOUS at 11:55

## 2019-06-19 RX ADMIN — PROPOFOL 50 MG: 10 INJECTION, EMULSION INTRAVENOUS at 11:50

## 2019-06-19 RX ADMIN — SIMETHICONE 80 MG: 20 SUSPENSION/ DROPS ORAL at 12:03

## 2019-06-19 RX ADMIN — PROPOFOL 50 MG: 10 INJECTION, EMULSION INTRAVENOUS at 11:59

## 2019-06-19 NOTE — H&P
Pre-endoscopy H and P    The patient was seen and examined in the room/pre-op holding area. The airway was assessed and documented. The problem list, past medical history, and medications were reviewed.      Patient Active Problem List   Diagnosis Code    Benign essential hypertension I10    Hyperlipidemia E78.5    Depression F32.9    Unstable angina pectoris (HCC) I20.0    Left bundle-branch block I44.7    CAD (coronary artery disease) I25.10    S/P PTCA (percutaneous transluminal coronary angioplasty) Z98.61    PAF (paroxysmal atrial fibrillation) (HCC) I48.0     Social History     Socioeconomic History    Marital status: SINGLE     Spouse name: Not on file    Number of children: Not on file    Years of education: Not on file    Highest education level: Not on file   Occupational History    Not on file   Social Needs    Financial resource strain: Not on file    Food insecurity:     Worry: Not on file     Inability: Not on file    Transportation needs:     Medical: Not on file     Non-medical: Not on file   Tobacco Use    Smoking status: Former Smoker     Packs/day: 0.50     Years: 50.00     Pack years: 25.00     Last attempt to quit: 2012     Years since quittin.0    Smokeless tobacco: Never Used   Substance and Sexual Activity    Alcohol use: Yes     Comment: occasional    Drug use: No    Sexual activity: Not on file   Lifestyle    Physical activity:     Days per week: Not on file     Minutes per session: Not on file    Stress: Not on file   Relationships    Social connections:     Talks on phone: Not on file     Gets together: Not on file     Attends Sabianism service: Not on file     Active member of club or organization: Not on file     Attends meetings of clubs or organizations: Not on file     Relationship status: Not on file    Intimate partner violence:     Fear of current or ex partner: Not on file     Emotionally abused: Not on file     Physically abused: Not on file Forced sexual activity: Not on file   Other Topics Concern    Not on file   Social History Narrative    Not on file     Past Medical History:   Diagnosis Date    CAD (coronary artery disease)     Family history of skin cancer     Hypercholesteremia     Hypertension     Psychiatric disorder     depression    Skin cancer          Prior to Admission Medications   Prescriptions Last Dose Informant Patient Reported? Taking? ELIQUIS 5 mg tablet 2019  No No   Sig: TAKE 1 TAB BY MOUTH TWO (2) TIMES A DAY. ZOLOFT 25 mg tablet 2019  Yes No   Sig: Take 25 mg by mouth daily. aspirin 81 mg chewable tablet 2019  No No   Sig: Take 1 Tab by mouth daily. atorvastatin (LIPITOR) 20 mg tablet 2019  No No   Sig: Take 1 Tab by mouth daily. Refills per Dr. Oksana Chatterjee who is doing labs, please   b complex vitamins tablet 2019  Yes No   Sig: Take 1 Tab by mouth daily. cholecalciferol, vitamin D3, (VITAMIN D3) 2,000 unit Tab 2019  Yes No   Sig: Take  by mouth.     cyanocobalamin 1,000 mcg tablet 2019  Yes No   Sig: Take 1,000 mcg by mouth daily. doxazosin (CARDURA) 1 mg tablet 2019  No No   Sig: TAKE 1 TABLET BY MOUTH EVERY DAY   esomeprazole (NEXIUM) 20 mg capsule 2019 at Unknown time  Yes Yes   Sig: Take 22.3 mg by mouth as needed. lisinopril-hydroCHLOROthiazide (PRINZIDE, ZESTORETIC) 20-12.5 mg per tablet 2019 at Unknown time  No Yes   Sig: TAKE 1 TAB BY MOUTH TWO (2) TIMES A DAY. Patient taking differently: Take 1 Tab by mouth daily. magnesium oxide 500 mg tab 2019  Yes No   Sig: Take  by mouth.   metoprolol succinate (TOPROL-XL) 50 mg XL tablet 2019 at Unknown time  No Yes   Sig: TAKE 1 TAB BY MOUTH NIGHTLY. nitroglycerin (NITROSTAT) 0.4 mg SL tablet   No No   Si tablet by SubLINGual route every five (5) minutes as needed (call 911 if not relieved by 3).    omega-3 fatty acids-vitamin e (FISH OIL) 1,000 mg Cap 2019  Yes No   Sig: Take 1 Cap by mouth daily. 788mg daily      Facility-Administered Medications: None           The review of systems is:  Negative  for shortness of breath or chest pain      The heart, lungs, and mental status were satisfactory for the administration of deep sedation and for the procedure. I discussed with the patient the objectives, risks, consequences and alternatives to the procedure.       Zay Roman MD  6/19/2019  11:47 AM

## 2019-06-19 NOTE — ROUTINE PROCESS
[facility] 1940 
[id number] Situation: 
Verbal report received from: LU Sanderson RN Procedure: Procedure(s): ESOPHAGOGASTRODUODENOSCOPY (EGD) COLONOSCOPY 
ESOPHAGOGASTRODUODENAL (EGD) BIOPSY Background: 
 
Preoperative diagnosis: chronic idiopathic constipation, dysphagia, fam hx colon polyps, gerd Postoperative diagnosis: EGD - Hiatal Hernia, Gastritis COLON - Diverticulosis, Hemorrhoids :  Dr. Lobato Risk 
Assistant(s): Endoscopy Technician-1: Giuliana Waggoner Endoscopy RN-1: Arn Bumpers, RN Specimens:  
ID Type Source Tests Collected by Time Destination 1 : Gastric bx Preservative Gastric  Manual MD Trent 6/19/2019 1158 Pathology 2 : GE Junction bx Preservative   David Newman MD 6/19/2019 1159 Pathology 3 : Mid to Distal Esophagus bx Preservative   Manual MD Trent 6/19/2019 1159 Pathology H. Pylori  no Assessment: 
Intra-procedure medications Anesthesia gave intra-procedure sedation and medications, see anesthesia flow sheet Intravenous fluids: NS@ Ariella Beata Vital signs stable Abdominal assessment: round and soft Recommendation: 
Discharge patient per MD order. Family or Friend Permission to share finding with family or friend yes

## 2019-06-19 NOTE — PROCEDURES
[place] Office: [phone]      Esophagogastroduodenoscopy Procedure Note      Lurdes Perry  1940  [id number]    Indication:  Chest pain, GERD     : Alek Starks MD    Referring Provider:  Marie Jimenez MD    Sedation:  MAC anesthesia Propofol    Procedure Details:  After detailed informed consent was obtained for the procedure, with all risks and benefits of procedure explained the patient was taken to the endoscopy suite and placed in the left lateral decubitus position. Following sequential administration of sedation as per above, the endoscope was inserted into the mouth and advanced under direct vision to second portion of the duodenum. A careful inspection was made as the gastroscope was withdrawn, including a retroflexed view of the proximal stomach; findings and interventions are described below. Findings:     Esophagus: The esophageal mucosa in the proximal, mid and distal esophagus is normal.   The squamo-columnar junction is at 38 cm where the Z-line was noted. No stricture is noted. Z line is irregular. I took biopsies. I also took biopsies of mid to distal esophagus. There is a 2-3 cm hiatal hernia. Stomach: The gastric mucosa has erythema in the body: Biopsies were obtained. The fundus was found to be normal with no lesions noted on retroflexion. The angularis is normal as well. Duodenum:   The bulb and post bulbar mucosa is normal in appearance. The duodenal folds are normal.     Therapies:  biopsy of esophagus  biopsy of stomach body    Specimen:  Specimens were collected as described and send to the laboratory. Complications:   None were encountered during the procedure. EBL:  None. Recommendations:     -Acid suppression with a proton pump inhibitor. ,   -Await pathology. ,   -Follow symptoms. ,   -GERD diet: avoid fried and fatty foods.  peppermint, chocolate, alcohol, coffee, citrus fruits and juices, tomoato products; avoid lying down for 2 to 3 hours after eating., -Follow up with primary care physician      Thank you for entrusting me with this patient's care. Please do not hesitate to contact me with any questions or if I can be of assistance with any of your other patients' GI needs. Rachid Leung MD  6/19/2019  12:15 PM

## 2019-06-19 NOTE — PROGRESS NOTES
Anesthesia reports 230 mg Propofol, 100 mg Lidocaine and 450 mL NS, 0.2 mg Robinul given during procedure. Received report from anesthesia staff on vital signs and status of patient.

## 2019-06-19 NOTE — PROCEDURES
Colonoscopy Procedure Note    Jean Kasper  1940  [id number]    Indications:  Please see below. Pre-operative Diagnosis: + fecal Cologuard test    Post-operative Diagnosis:  Diverticulosis, internal hemorrhoids       : Rajni Gonzalez MD    Referring Provider: Giovanni Moura MD    Sedation:  MAC anesthesia Propofol        Procedure Details:    After detailed informed consent was obtained with all risks and benefits of procedure explained and preoperative exam completed, the patient was taken to the endoscopy suite and placed in the left lateral decubitus position. Upon sequential sedation as per above, a digital rectal exam was performed  And was normal.  The Olympus videocolonoscope  was inserted in the rectum and carefully advanced to the cecum, which was identified by the ileocecal valve and appendiceal orifice. The quality of preparation was good. The colonoscope was slowly withdrawn with careful evaluation between folds. Retroflexion in the rectum was performed. Findings:   · The colon is normal appearing to the cecum. There are no polyps or masses noted. · Mucosa is normal.  · Mild sigmoid diverticulosis. · Medium internal hemorrhoids noted. Therapies:  none    Specimen:  None       Complications: None were encountered during the procedure. EBL:  None. Recommendations:     -Annual Fecal Occult blood testing.  -There are no specific guidelines, yet, on how to follow + Cologuard with a normal colonoscopy patients    .-For new bleeding, unexplained weight loss,change in bowel habits and anemia, acolonoscopy should be considered.  -Colonoscopy in 2 years. Rachid Gonzalez MD  6/19/2019  12:10 PM

## 2019-06-19 NOTE — DISCHARGE INSTRUCTIONS
[place] Office: [phone]    Panchito Tyler  [id number]  1940    EGD/COLONOSCOPY DISCHARGE INSTRUCTIONS  Discomfort:  Sore throat- throat lozenges or warm salt water gargle  redness at IV site- apply warm compress to area; if redness or soreness persist- contact your physician  Gaseous discomfort- walking, belching will help relieve any discomfort  You may not operate a vehicle for 12 hours  You may not engage in an occupation involving machinery or appliances for rest of today. You may not drink alcoholic beverages for at least 12 hours  Avoid making any critical decisions for at least 24 hour  DIET  You may resume your regular diet - however -  remember your colon is empty and a heavy meal will produce gas. Avoid these foods:  fried / greasy foods, excessive carbonated drinks or too much caffeine  MEDICATIONS   Regarding Aspirin or Nonsteroidal medications specifically, please see below. ACTIVITY  You may resume your normal daily activities. Spend the remainder of the day resting -  avoid any strenuous activity. CALL M.D. ANY SIGN OF   Increasing pain, nausea, vomiting  Abdominal distension (swelling)  New increased bleeding (oral or rectal)  Fever (chills)  Pain in chest area  Bloody discharge from nose or mouth  Shortness of breath    You may not take any Advil, Aspirin, Ibuprofen, Motrin, Aleve, or Goodys for 5 days, ONLY  Tylenol as needed for pain. Start Eliquis in 2 days. Follow-up Instructions:   Call  Rachid Travis MD for any questions or concerns  Results of procedure / biopsy in 7 days   Telephone # [phone]      Follow-up Information    None

## 2019-06-19 NOTE — ANESTHESIA POSTPROCEDURE EVALUATION
Procedure(s):  ESOPHAGOGASTRODUODENOSCOPY (EGD)  COLONOSCOPY  ESOPHAGOGASTRODUODENAL (EGD) BIOPSY. MAC    Anesthesia Post Evaluation        Patient location during evaluation: PACU  Note status: Adequate. Level of consciousness: responsive to verbal stimuli and sleepy but conscious  Pain management: satisfactory to patient  Airway patency: patent  Anesthetic complications: no  Cardiovascular status: acceptable  Respiratory status: acceptable  Hydration status: acceptable  Comments: +Post-Anesthesia Evaluation and Assessment    Patient: Celina Fitzpatrick MRN: [id number]  SSN: [id number]   YOB: 1940  Age: 70 y.o. Sex: female      Cardiovascular Function/Vital Signs    /58   Pulse 80   Temp 36.7 °C (98.1 °F)   Resp 18   Ht 5' 2\" (1.575 m)   Wt 83 kg (183 lb)   SpO2 97%   BMI 33.47 kg/m²     Patient is status post Procedure(s):  ESOPHAGOGASTRODUODENOSCOPY (EGD)  COLONOSCOPY  ESOPHAGOGASTRODUODENAL (EGD) BIOPSY. Nausea/Vomiting: Controlled. Postoperative hydration reviewed and adequate. Pain:  Pain Scale 1: Numeric (0 - 10) (06/19/19 1131)  Pain Intensity 1: 5 (06/19/19 1131)   Managed. Neurological Status: At baseline. Mental Status and Level of Consciousness: Arousable. Pulmonary Status:   O2 Device: CO2 nasal cannula (06/19/19 1210)   Adequate oxygenation and airway patent. Complications related to anesthesia: None    Post-anesthesia assessment completed. No concerns.     Signed By: Darrel Gomes MD    6/19/2019  Post anesthesia nausea and vomiting:  controlled      Vitals Value Taken Time   /58 6/19/2019 12:10 PM   Temp     Pulse 80 6/19/2019 12:10 PM   Resp 18 6/19/2019 12:10 PM   SpO2 97 % 6/19/2019 12:10 PM

## 2019-07-08 ENCOUNTER — PATIENT MESSAGE (OUTPATIENT)
Dept: CARDIOLOGY CLINIC | Age: 79
End: 2019-07-08

## 2019-07-08 ENCOUNTER — OFFICE VISIT (OUTPATIENT)
Dept: CARDIOLOGY CLINIC | Age: 79
End: 2019-07-08

## 2019-07-08 VITALS
HEIGHT: 62 IN | DIASTOLIC BLOOD PRESSURE: 82 MMHG | RESPIRATION RATE: 20 BRPM | BODY MASS INDEX: 33.99 KG/M2 | OXYGEN SATURATION: 98 % | SYSTOLIC BLOOD PRESSURE: 130 MMHG | HEART RATE: 67 BPM | WEIGHT: 184.7 LBS

## 2019-07-08 DIAGNOSIS — E78.2 MIXED HYPERLIPIDEMIA: ICD-10-CM

## 2019-07-08 DIAGNOSIS — I10 BENIGN ESSENTIAL HYPERTENSION: ICD-10-CM

## 2019-07-08 DIAGNOSIS — I48.0 PAF (PAROXYSMAL ATRIAL FIBRILLATION) (HCC): ICD-10-CM

## 2019-07-08 DIAGNOSIS — I44.7 LEFT BUNDLE-BRANCH BLOCK: ICD-10-CM

## 2019-07-08 DIAGNOSIS — I25.10 ASHD (ARTERIOSCLEROTIC HEART DISEASE): Primary | ICD-10-CM

## 2019-07-08 DIAGNOSIS — Z98.61 S/P PTCA (PERCUTANEOUS TRANSLUMINAL CORONARY ANGIOPLASTY): ICD-10-CM

## 2019-07-08 RX ORDER — CHOLECALCIFEROL (VITAMIN D3) 125 MCG
CAPSULE ORAL
COMMUNITY
End: 2020-01-23

## 2019-07-08 RX ORDER — PRAMIPEXOLE DIHYDROCHLORIDE 0.12 MG/1
0.12 TABLET ORAL 3 TIMES DAILY
COMMUNITY
End: 2019-07-16 | Stop reason: CLARIF

## 2019-07-08 RX ORDER — CALCIUM CARBONATE 500(1250)
TABLET ORAL DAILY
COMMUNITY

## 2019-07-08 NOTE — PROGRESS NOTES
[address], [address] [facility]  [phone]     Subjective:      Carlyle Santoyo is a 70 y.o. female is here for routine f/u. Denies chest pain, shortness of breath orthopnea, PND, LE edema, more than brief palpitations, syncope, or presyncope. She does point out that after walking for prolonged period of time her legs burn. She points out that she has superficial venous varicosities.     Patient Active Problem List    Diagnosis Date Noted    PAF (paroxysmal atrial fibrillation) ([facility] [address] 10/12/2017    Benign essential hypertension 05/19/2012    Hyperlipidemia 05/19/2012    Depression 05/19/2012    Unstable angina pectoris ([facility] [address] 05/19/2012    Left bundle-branch block 05/19/2012    CAD (coronary artery disease) 05/19/2012    S/P PTCA (percutaneous transluminal coronary angioplasty) 05/19/2012      Erika Calderon MD  Past Medical History:   Diagnosis Date    CAD (coronary artery disease)     Family history of skin cancer     Hypercholesteremia     Hypertension     Psychiatric disorder     depression    Skin cancer       Past Surgical History:   Procedure Laterality Date    AMB POC MOHS 1 STAGE H/N/HF/G      CARDIAC CATHETERIZATION  5/19/2012         COLONOSCOPY N/A 6/19/2019    COLONOSCOPY performed [facility] Fuentes [facility] END[facility]COPY    DRUG ELUTING STENT SINGLE VESS  5/19/2012     Dr Tabares Phlegm    HX [id number]  04/13/2017    [facility] left superior helical rim by Dr. Satnam Garcia  12/26/2017    BCC L nasal sidewall by Dr. Landy Cook [Amlodipine] Swelling      Family History   Problem Relation Age of Onset    Coronary Artery Disease Other         daughter stent age 28      Social History     Socioeconomic History    Marital status:      Spouse name: Not on file    Number of children: Not on file    Years of education: Not on file    Highest education level: Not on file Occupational History    Not on file   Social Needs    Financial resource strain: Not on file    Food insecurity:     Worry: Not on file     Inability: Not on file    Transportation needs:     Medical: Not on file     Non-medical: Not on file   Tobacco Use    Smoking status: Former Smoker     Packs/day: 0.50     Years: 50.00     Pack years: 25.00     Last attempt to quit: 2012     Years since quittin.1    Smokeless tobacco: Never Used   Substance and Sexual Activity    Alcohol use: Yes     Comment: occasional    Drug use: No    Sexual activity: Not on file   Lifestyle    Physical activity:     Days per week: Not on file     Minutes per session: Not on file    Stress: Not on file   Relationships    Social connections:     Talks on phone: Not on file     Gets together: Not on file     Attends Sabianism service: Not on file     Active member of club or organization: Not on file     Attends meetings of clubs or organizations: Not on file     Relationship status: Not on file    Intimate partner violence:     Fear of current or ex partner: Not on file     Emotionally abused: Not on file     Physically abused: Not on file     Forced sexual activity: Not on file   Other Topics Concern    Not on file   Social History Narrative    Not on file      Current Outpatient Medications   Medication Sig    calcium carbonate (OS-KYLE) 500 mg calcium (1,250 mg) tablet Take  by mouth daily.  TURMERIC PO Take  by mouth.  pramipexole (MIRAPEX) 0.125 mg tablet Take 0.125 mg by mouth three (3) times daily.  naproxen sodium (ALEVE) 220 mg cap Take  by mouth.  ELIQUIS 5 mg tablet TAKE 1 TAB BY MOUTH TWO (2) TIMES A DAY.  doxazosin (CARDURA) 1 mg tablet TAKE 1 TABLET BY MOUTH EVERY DAY    lisinopril-hydroCHLOROthiazide (PRINZIDE, ZESTORETIC) 20-12.5 mg per tablet TAKE 1 TAB BY MOUTH TWO (2) TIMES A DAY.  (Patient taking differently: Take 1 Tab by mouth daily.)    metoprolol succinate (TOPROL-XL) 50 mg XL tablet TAKE 1 TAB BY MOUTH NIGHTLY.  magnesium oxide 500 mg tab Take  by mouth.  atorvastatin (LIPITOR) 20 mg tablet Take 1 Tab by mouth daily. Refills per Dr. Feng Parker who is doing labs, please    cyanocobalamin 1,000 mcg tablet Take 1,000 mcg by mouth daily.  ZOLOFT 25 mg tablet Take 25 mg by mouth daily.  b complex vitamins tablet Take 1 Tab by mouth daily.  esomeprazole (NEXIUM) 20 mg capsule Take 22.3 mg by mouth as needed.  omega-3 fatty acids-vitamin e (FISH OIL) 1,000 mg Cap Take 1 Cap by mouth daily. 788mg daily    cholecalciferol, vitamin D3, (VITAMIN D3) 2,000 unit Tab Take  by mouth.  aspirin 81 mg chewable tablet Take 1 Tab by mouth daily.  nitroglycerin (NITROSTAT) 0.4 mg SL tablet 1 Tab by SubLINGual route every five (5) minutes as needed (call 911 if not relieved by 3). No current facility-administered medications for this visit. Review of Symptoms:  11 systems reviewed, negative other than as stated in the HPI    Physical ExamPhysical Exam:    Vitals:    07/08/19 1330   BP: 130/82   Pulse: 67   Resp: 20   SpO2: 98%   Weight: 184 lb 11.2 oz (83.8 kg)   Height: 5' 2\" (1.575 m)     Body mass index is 33.78 kg/m². General PE   Gen:  NAD  Mental Status - Alert. General Appearance - Not in acute distress. Chest and Lung Exam   Inspection: Accessory muscles - No use of accessory muscles in breathing. Auscultation:   Breath sounds: - Normal.   Cardiovascular   Inspection: Jugular vein - Bilateral - Inspection Normal.   Palpation/Percussion:   Apical Impulse: - Normal.   Auscultation: Rhythm - Regular. Heart Sounds - S1 WNL and S2 WNL. No S3 or S4. Murmurs & Other Heart Sounds: Auscultation of the heart reveals - No Murmurs. Peripheral Vascular   Upper Extremity: Inspection - Bilateral - No Cyanotic nailbeds or Digital clubbing. Lower Extremity:   Palpation: Edema - Bilateral - No edema. Abdomen:   Soft, non-tender, bowel sounds are active.   Neuro: A&O times 3, CN and motor grossly WNL    Labs:   Lab Results   Component Value Date/Time    Cholesterol, total 161 10/16/2014 10:38 AM    Cholesterol, total 168 09/17/2013 12:00 AM    Cholesterol, total 172 01/17/2013 10:41 AM    Cholesterol, total 164 09/05/2012 10:30 AM    Cholesterol, total 131 05/20/2012 03:50 AM    HDL Cholesterol 85 10/16/2014 10:38 AM    HDL Cholesterol 82 09/17/2013 12:00 AM    HDL Cholesterol 84 01/17/2013 10:41 AM    HDL Cholesterol 65 09/05/2012 10:30 AM    HDL Cholesterol 48 05/20/2012 03:50 AM    LDL, calculated 65 10/16/2014 10:38 AM    LDL, calculated 70 09/17/2013 12:00 AM    LDL, calculated 66 01/17/2013 10:41 AM    LDL, calculated 66 09/05/2012 10:30 AM    LDL, calculated 46.8 05/20/2012 03:50 AM    Triglyceride 56 10/16/2014 10:38 AM    Triglyceride 79 09/17/2013 12:00 AM    Triglyceride 111 01/17/2013 10:41 AM    Triglyceride 167 (H) 09/05/2012 10:30 AM    Triglyceride 181 (H) 05/20/2012 03:50 AM    CHOL/HDL Ratio 2.7 05/20/2012 03:50 AM     Lab Results   Component Value Date/Time    CK 77 04/18/2013 02:41 AM     Lab Results   Component Value Date/Time    Sodium 138 10/17/2017 03:12 PM    Potassium 4.0 10/17/2017 03:12 PM    Chloride 94 (L) 10/17/2017 03:12 PM    CO2 27 10/17/2017 03:12 PM    Anion gap 9 09/12/2014 01:25 PM    Glucose 89 10/17/2017 03:12 PM    BUN 13 10/17/2017 03:12 PM    Creatinine 0.79 10/17/2017 03:12 PM    BUN/Creatinine ratio 16 10/17/2017 03:12 PM    GFR est AA 85 10/17/2017 03:12 PM    GFR est non-AA 73 10/17/2017 03:12 PM    Calcium 9.7 10/17/2017 03:12 PM    Bilirubin, total 0.6 10/17/2017 03:12 PM    AST (SGOT) 17 10/17/2017 03:12 PM    Alk. phosphatase 93 10/17/2017 03:12 PM    Protein, total 6.9 10/17/2017 03:12 PM    Albumin 4.3 10/17/2017 03:12 PM    Globulin 3.7 09/12/2014 01:25 PM    A-G Ratio 1.7 10/17/2017 03:12 PM    ALT (SGPT) 11 10/17/2017 03:12 PM       EKG:  NSR, IVCD/borderline LBBB     Assessment:        1.  ASHD (arteriosclerotic heart disease)    2. S/P PTCA (percutaneous transluminal coronary angioplasty)    3. PAF (paroxysmal atrial fibrillation) ([facility] [address]    4. Mixed hyperlipidemia    5. Benign essential hypertension    6. Left bundle-branch block        Orders Placed This Encounter    AMB POC EKG ROUTINE W/ 12 LEADS, INTER & REP     Order Specific Question:   Reason for Exam:     Answer:   yearly visit    calcium carbonate (OS-KYLE) 500 mg calcium (1,250 mg) tablet     Sig: Take  by mouth daily.  TURMERIC PO     Sig: Take  by mouth.  pramipexole (MIRAPEX) 0.125 mg tablet     Sig: Take 0.125 mg by mouth three (3) times daily.  naproxen sodium (ALEVE) 220 mg cap     Sig: Take  by mouth. Plan:     Pt presents for 1 year follow-up. ASHD, remote PTCA CLEMENTE pox LAD 05'12  Negative nuclear stress test in 12/14  Mild unchanged REYNA. Stable. Continue ASA, BB, statin    PAF  Holter 10/17: Palpitations correspond to PAF with RVR. Occasional palpitations, mild fatigue when HR is \"irregular\"  Only one episode in the last few months that she can recall. Maintaining sinus rhythm with metoprolol XL continue current therapy. Tolerating Eliquis without bruising or bleeding. Echo August 2018 showed normal LVEF, 55 to 60%, with no significant valvular pathology. Hypertension  Controlled with current regimen     HLD   On statin. Lipids and labs followed by PCP. D/t limiting back pain, she is unable to do exercise therapy. Has seen back specialist years ago. She states that she went to see a spine specialist at [address] [place] [address] though since I saw her last and he thought that her pathology was not that bad on imaging, recommended treatment with medications. Leg burning with exercise, venous varicosities, rule out claudication:  Check ABIs and venous reflux study. If abnormal, follow-up with Dr. Oly Bardales. Otherwise, continue current care and f/u in 12 months.     Tobias Crabtree MD

## 2019-07-08 NOTE — LETTER
7/8/19 Patient: Peg Robison YOB: 1940 Date of Visit: 7/8/2019 Aggie Philippe MD 
[id number] [address] [address] VIA Facsimile: [phone] Dear Aggie Philippe MD, Thank you for referring Ms. Lenora Graham to [address] [place] [address] for evaluation. My notes for this consultation are attached. If you have questions, please do not hesitate to call me. I look forward to following your patient along with you.  
 
 
Sincerely, 
 
Randa Santana MD

## 2019-07-08 NOTE — PROGRESS NOTES
Chief Complaint   Patient presents with    Coronary Artery Disease     yearly follow up     1. Have you been to the ER, urgent care clinic since your last visit? No    Hospitalized since your last visit? No     2. Have you seen or consulted any other health care providers outside of the [address] [place] [address] since your last visit?   No

## 2019-07-16 RX ORDER — POLYETHYLENE GLYCOL 3350 17 G/17G
17 POWDER, FOR SOLUTION ORAL AS NEEDED
COMMUNITY

## 2019-07-22 ENCOUNTER — HOSPITAL ENCOUNTER (OUTPATIENT)
Dept: CT IMAGING | Age: 79
Discharge: HOME | End: 2019-07-22
Attending: INTERNAL MEDICINE
Payer: MEDICARE

## 2019-07-22 DIAGNOSIS — Z01.89 OTHER LABORATORY EXAMINATION: ICD-10-CM

## 2019-07-22 LAB — CREAT BLD-MCNC: 0.7 MG/DL (ref 0.6–1.3)

## 2019-07-22 PROCEDURE — 74177 CT ABD & PELVIS W/CONTRAST: CPT

## 2019-07-22 PROCEDURE — 74011636320 HC RX REV CODE- 636/320: Performed by: INTERNAL MEDICINE

## 2019-07-22 PROCEDURE — 82565 ASSAY OF CREATININE: CPT

## 2019-07-22 PROCEDURE — 74011000255 HC RX REV CODE- 255: Performed by: INTERNAL MEDICINE

## 2019-07-22 PROCEDURE — 71260 CT THORAX DX C+: CPT

## 2019-07-22 RX ORDER — BARIUM SULFATE 20 MG/ML
900 SUSPENSION ORAL
Status: COMPLETED | OUTPATIENT
Start: 2019-07-22 | End: 2019-07-22

## 2019-07-22 RX ORDER — SODIUM CHLORIDE 0.9 % (FLUSH) 0.9 %
10 SYRINGE (ML) INJECTION
Status: COMPLETED | OUTPATIENT
Start: 2019-07-22 | End: 2019-07-22

## 2019-07-22 RX ADMIN — BARIUM SULFATE 900 ML: 21 SUSPENSION ORAL at 10:58

## 2019-07-22 RX ADMIN — IOPAMIDOL 100 ML: 755 INJECTION, SOLUTION INTRAVENOUS at 10:57

## 2019-07-22 RX ADMIN — Medication 10 ML: at 10:57

## 2019-08-01 RX ORDER — LISINOPRIL AND HYDROCHLOROTHIAZIDE 12.5; 2 MG/1; MG/1
1 TABLET ORAL DAILY
Qty: 90 TAB | Refills: 3 | Status: SHIPPED | OUTPATIENT
Start: 2019-08-01 | End: 2019-11-27 | Stop reason: ALTCHOICE

## 2019-09-03 RX ORDER — APIXABAN 5 MG/1
TABLET, FILM COATED ORAL
Qty: 60 TAB | Refills: 3 | Status: SHIPPED | OUTPATIENT
Start: 2019-09-03 | End: 2019-12-23

## 2019-09-26 ENCOUNTER — TELEPHONE (OUTPATIENT)
Dept: CARDIOLOGY CLINIC | Age: 79
End: 2019-09-26

## 2019-09-26 RX ORDER — LISINOPRIL AND HYDROCHLOROTHIAZIDE 12.5; 2 MG/1; MG/1
1 TABLET ORAL DAILY
Qty: 90 TAB | Refills: 3 | Status: SHIPPED | OUTPATIENT
Start: 2019-09-26 | End: 2019-09-30 | Stop reason: ALTCHOICE

## 2019-09-30 RX ORDER — LISINOPRIL AND HYDROCHLOROTHIAZIDE 12.5; 2 MG/1; MG/1
2 TABLET ORAL DAILY
Qty: 180 TAB | Refills: 3 | Status: SHIPPED | OUTPATIENT
Start: 2019-09-30 | End: 2019-11-27 | Stop reason: SDUPTHER

## 2019-09-30 NOTE — TELEPHONE ENCOUNTER
Verified patient with 2 identifiers   Per chart notes patient is supposed to be taking one tablet twice daily. Last prescribed as once daily. Needs the correct amount sent in as she is almost out of the med. It was documented at last ov that patient was taking only one a day but patient states that was not true. States she takes one tab twice daily.

## 2019-09-30 NOTE — TELEPHONE ENCOUNTER
Patient called said she takes 2 pills a day of lisinopril. Pharmacy received her to take 1 pill. Anyhow patient is short her does . This happened back in Aug as well. Can you please resend with 2 tablets a day. . Thanks

## 2019-10-21 RX ORDER — METOPROLOL SUCCINATE 50 MG/1
50 TABLET, EXTENDED RELEASE ORAL
Qty: 90 TAB | Refills: 3 | Status: SHIPPED | OUTPATIENT
Start: 2019-10-21 | End: 2020-10-09

## 2019-10-31 RX ORDER — DOXAZOSIN 1 MG/1
TABLET ORAL
Qty: 90 TAB | Refills: 2 | Status: SHIPPED | OUTPATIENT
Start: 2019-10-31 | End: 2020-09-30

## 2019-11-27 ENCOUNTER — OFFICE VISIT (OUTPATIENT)
Dept: CARDIOLOGY CLINIC | Age: 79
End: 2019-11-27

## 2019-11-27 VITALS
RESPIRATION RATE: 16 BRPM | DIASTOLIC BLOOD PRESSURE: 70 MMHG | SYSTOLIC BLOOD PRESSURE: 130 MMHG | HEIGHT: 62 IN | OXYGEN SATURATION: 94 % | BODY MASS INDEX: 34.1 KG/M2 | HEART RATE: 79 BPM | WEIGHT: 185.3 LBS

## 2019-11-27 DIAGNOSIS — I25.10 ASHD (ARTERIOSCLEROTIC HEART DISEASE): Primary | ICD-10-CM

## 2019-11-27 DIAGNOSIS — E78.2 MIXED HYPERLIPIDEMIA: ICD-10-CM

## 2019-11-27 DIAGNOSIS — Z98.61 S/P PTCA (PERCUTANEOUS TRANSLUMINAL CORONARY ANGIOPLASTY): ICD-10-CM

## 2019-11-27 DIAGNOSIS — I10 BENIGN ESSENTIAL HYPERTENSION: ICD-10-CM

## 2019-11-27 DIAGNOSIS — I44.7 LEFT BUNDLE-BRANCH BLOCK: ICD-10-CM

## 2019-11-27 RX ORDER — GABAPENTIN 100 MG/1
100 CAPSULE ORAL
COMMUNITY
Start: 2019-01-16 | End: 2019-11-27

## 2019-11-27 RX ORDER — ALBUTEROL SULFATE 90 UG/1
AEROSOL, METERED RESPIRATORY (INHALATION)
Refills: 0 | COMMUNITY
Start: 2019-09-20

## 2019-11-27 RX ORDER — OLMESARTAN MEDOXOMIL AND HYDROCHLOROTHIAZIDE 40/25 40; 25 MG/1; MG/1
1 TABLET ORAL DAILY
Qty: 90 TAB | Refills: 1 | Status: SHIPPED | OUTPATIENT
Start: 2019-11-27 | End: 2019-12-20

## 2019-11-27 RX ORDER — METOPROLOL TARTRATE 25 MG/1
TABLET, FILM COATED ORAL
COMMUNITY
Start: 2015-02-06 | End: 2019-11-27

## 2019-11-27 RX ORDER — LISINOPRIL AND HYDROCHLOROTHIAZIDE 12.5; 2 MG/1; MG/1
2 TABLET ORAL DAILY
Qty: 180 TAB | Refills: 0 | Status: SHIPPED | OUTPATIENT
Start: 2019-11-27 | End: 2019-11-27 | Stop reason: ALTCHOICE

## 2019-11-27 NOTE — PROGRESS NOTES
Deborah Baer DNP, ANP-BC  Subjective/HPI:     Fe Sweeney is a 70 y.o. female is here for symptom based appointment. Patient reports having increasing dyspnea on exertion and increasing fatigue over the last month. She denies exertional chest pain. She is also noted 2 days of hypertensive readings without associated lightheadedness dizziness or atypical headaches, she has a history of intermittent migraine headaches. She reports compliance with medications, no stimulants excessive amounts of caffeine or decongestants. PCP Provider  Barbara Rod MD  Past Medical History:   Diagnosis Date    CAD (coronary artery disease)     Family history of skin cancer     Hypercholesteremia     Hypertension     Psychiatric disorder     depression    Skin cancer       Past Surgical History:   Procedure Laterality Date    AMB POC MOHS 1 STAGE H/N/HF/G      CARDIAC CATHETERIZATION  5/19/2012         COLONOSCOPY N/A 6/19/2019    COLONOSCOPY performed by [facility]    DRUG ELUTING STENT SINGLE VESS  5/19/2012     Dr Bobby Max    HX [id number]  04/13/2017    [facility] left superior helical rim by Dr. Stef Steele  12/26/2017    [facility] nasal sidewall by Dr. Awan Lynn      Allergies   Allergen Reactions    Norvasc [Amlodipine] Swelling      Family History   Problem Relation Age of Onset    Coronary Artery Disease Other         daughter stent age 28      Current Outpatient Medications   Medication Sig    albuterol (PROVENTIL HFA, VENTOLIN HFA, PROAIR HFA) 90 mcg/actuation inhaler INHALE 2 PUFFS BY MOUTH EVERY 4 HOURS AS NEEDED    olmesartan-hydroCHLOROthiazide (BENICAR HCT) 40-25 mg per tablet Take 1 Tab by mouth daily. D/C lisinopril/hctz 11/27/19    doxazosin (CARDURA) 1 mg tablet TAKE 1 TABLET BY MOUTH EVERY DAY    metoprolol succinate (TOPROL-XL) 50 mg XL tablet TAKE 1 TAB BY MOUTH NIGHTLY.  ELIQUIS 5 mg tablet TAKE 1 TAB BY MOUTH TWO (2) TIMES A DAY.  polyethylene glycol (MIRALAX) 17 gram/dose powder Take 17 g by mouth daily.  calcium carbonate (OS-KYLE) 500 mg calcium (1,250 mg) tablet Take  by mouth daily.  naproxen sodium (ALEVE) 220 mg cap Take  by mouth.  nitroglycerin (NITROSTAT) 0.4 mg SL tablet 1 Tab by SubLINGual route every five (5) minutes as needed (call 911 if not relieved by 3).  magnesium oxide 500 mg tab Take  by mouth.  atorvastatin (LIPITOR) 20 mg tablet Take 1 Tab by mouth daily. Refills per Dr. Digna Molina who is doing labs, please    cyanocobalamin 1,000 mcg tablet Take 1,000 mcg by mouth daily.  ZOLOFT 25 mg tablet Take 25 mg by mouth daily.  b complex vitamins tablet Take 1 Tab by mouth daily.  esomeprazole (NEXIUM) 20 mg capsule Take 20 mg by mouth as needed.  omega-3 fatty acids-vitamin e (FISH OIL) 1,000 mg Cap Take 1 Cap by mouth daily. 788mg daily    aspirin 81 mg chewable tablet Take 1 Tab by mouth daily. No current facility-administered medications for this visit.        Vitals:    19 1206 19 1316   BP: 140/80 130/70   Pulse: 79    Resp: 16    SpO2: 94%    Weight: 185 lb 4.8 oz (84.1 kg)    Height: 5' 2\" (1.575 m)      Social History     Socioeconomic History    Marital status:      Spouse name: Not on file    Number of children: Not on file    Years of education: Not on file    Highest education level: Not on file   Occupational History    Not on file   Social Needs    Financial resource strain: Not on file    Food insecurity:     Worry: Not on file     Inability: Not on file    Transportation needs:     Medical: Not on file     Non-medical: Not on file   Tobacco Use    Smoking status: Former Smoker     Packs/day: 0.50     Years: 50.00     Pack years: 25.00     Last attempt to quit: 2012     Years since quittin.5    Smokeless tobacco: Never Used   Substance and Sexual Activity    Alcohol use: Yes     Comment: occasional    Drug use: No    Sexual activity: Not on file   Lifestyle    Physical activity:     Days per week: Not on file     Minutes per session: Not on file    Stress: Not on file   Relationships    Social connections:     Talks on phone: Not on file     Gets together: Not on file     Attends [employer] service: Not on file     Active member of club or organization: Not on file     Attends meetings of clubs or organizations: Not on file     Relationship status: Not on file    Intimate partner violence:     Fear of current or ex partner: Not on file     Emotionally abused: Not on file     Physically abused: Not on file     Forced sexual activity: Not on file   Other Topics Concern    Not on file   Social History Narrative    Not on file       I have reviewed the nurses notes, vitals, problem list, allergy list, medical history, family, social history and medications. Review of Symptoms:    General: Pt denies excessive weight gain or loss. Pt is able to conduct ADL's  HEENT: Denies blurred vision, headaches, epistaxis and difficulty swallowing. Respiratory: Denies shortness of breath, + REYNA, wheezing or stridor. Cardiovascular: Denies precordial pain, palpitations, edema or PND  Gastrointestinal: Denies poor appetite, indigestion, abdominal pain or blood in stool  Musculoskeletal: Denies pain or swelling from muscles or joints  Neurologic: Denies tremor, paresthesias, or sensory motor disturbance  Skin: Denies rash, itching or texture change. Physical Exam:      General: Well developed, in no acute distress, cooperative and alert  HEENT: No carotid bruits, no JVD, trach is midline. Neck Supple, PERRL, EOM intact. Heart:  Normal S1/S2 negative S3 or S4. Regular, no murmur, gallop or rub. Respiratory: Clear bilaterally x 4, no wheezing or rales  Abdomen:   Soft, non-tender, no masses, bowel sounds are active. Extremities:  No edema, normal cap refill, no cyanosis, atraumatic. Neuro: A&Ox3, speech clear, gait stable.    Skin: Skin color is normal. No rashes or lesions. Non diaphoretic  Vascular: 2+ pulses symmetric in all extremities    Cardiographics    ECG: Sinus rhythm left bundle branch block  Results for orders placed or performed in visit on 10/05/17   CARDIAC HOLTER MONITOR, 24 HOURS    Narrative    ECG Monitor/24 hours, Complete    Reason for Holter Monitor   PALPITATIONS    Heartbeat    Slowest 48  Average 60  Fastest  91      Results:   Underlying Rhythm: Normal sinus rhythm      Atrial Arrhythmias: premature atrial contractions; rare and 4 short runs of symptomatic paroxysmal atrial fibrillation with RVR    AV Conduction: bundle branch block    Ventricular Arrhythmias: premature ventricular contractions; rare     ST Segment Analysis:non-specific changes     Symptom Correlation:  Palpitations correspond to PAF with RVR.       Martha Madrigal MD      Results for orders placed or performed during the hospital encounter of 09/12/14   EKG, 12 LEAD, INITIAL   Result Value Ref Range    Ventricular Rate 60 BPM    Atrial Rate 60 BPM    P-R Interval 172 ms    QRS Duration 146 ms    Q-T Interval 470 ms    QTC Calculation (Bezet) 470 ms    Calculated P Axis 12 degrees    Calculated R Axis -7 degrees    Calculated T Axis 57 degrees    Diagnosis       Normal sinus rhythm  Left bundle branch block  When compared with ECG of 18-APR-2013 02:38,  No significant change was found  Confirmed by Kymberly Escalera ([id number]) on 9/12/2014 1:39:12 PM         Cardiology Labs:  Lab Results   Component Value Date/Time    Cholesterol, total 161 10/16/2014 10:38 AM    HDL Cholesterol 85 10/16/2014 10:38 AM    LDL, calculated 65 10/16/2014 10:38 AM    Triglyceride 56 10/16/2014 10:38 AM    CHOL/HDL Ratio 2.7 05/20/2012 03:50 AM       Lab Results   Component Value Date/Time    Sodium 138 10/17/2017 03:12 PM    Potassium 4.0 10/17/2017 03:12 PM    Chloride 94 (L) 10/17/2017 03:12 PM    CO2 27 10/17/2017 03:12 PM    Anion gap 9 09/12/2014 01:25 PM    Glucose 89 10/17/2017 03:12 PM    BUN 13 10/17/2017 03:12 PM    Creatinine 0.79 10/17/2017 03:12 PM    BUN/Creatinine ratio 16 10/17/2017 03:12 PM    GFR est AA 85 10/17/2017 03:12 PM    GFR est non-AA 73 10/17/2017 03:12 PM    Calcium 9.7 10/17/2017 03:12 PM    Bilirubin, total 0.6 10/17/2017 03:12 PM    AST (SGOT) 17 10/17/2017 03:12 PM    Alk. phosphatase 93 10/17/2017 03:12 PM    Protein, total 6.9 10/17/2017 03:12 PM    Albumin 4.3 10/17/2017 03:12 PM    Globulin 3.7 09/12/2014 01:25 PM    A-G Ratio 1.7 10/17/2017 03:12 PM    ALT (SGPT) 11 10/17/2017 03:12 PM           Assessment:     Assessment:     Diagnoses and all orders for this visit:    1. ASHD (arteriosclerotic heart disease)  -     NUCLEAR CARDIAC STRESS TEST; Future  -     ECHO ADULT COMPLETE; Future  -     DUPLEX CAROTID BILATERAL    2. Benign essential hypertension  -     NUCLEAR CARDIAC STRESS TEST; Future  -     ECHO ADULT COMPLETE; Future  -     DUPLEX CAROTID BILATERAL    3. Left bundle-branch block  -     NUCLEAR CARDIAC STRESS TEST; Future  -     ECHO ADULT COMPLETE; Future  -     DUPLEX CAROTID BILATERAL    4. Mixed hyperlipidemia  -     AMB POC EKG ROUTINE W/ 12 LEADS, INTER & REP  -     NUCLEAR CARDIAC STRESS TEST; Future  -     ECHO ADULT COMPLETE; Future  -     DUPLEX CAROTID BILATERAL    5. S/P PTCA (percutaneous transluminal coronary angioplasty)  -     NUCLEAR CARDIAC STRESS TEST; Future  -     ECHO ADULT COMPLETE; Future  -     DUPLEX CAROTID BILATERAL    Other orders  -     olmesartan-hydroCHLOROthiazide (BENICAR HCT) 40-25 mg per tablet; Take 1 Tab by mouth daily. D/C lisinopril/hctz 11/27/19        ICD-10-CM ICD-9-CM    1. ASHD (arteriosclerotic heart disease) I25.10 414.00 NUCLEAR CARDIAC STRESS TEST      ECHO ADULT COMPLETE      DUPLEX CAROTID BILATERAL   2. Benign essential hypertension I10 401.1 NUCLEAR CARDIAC STRESS TEST      ECHO ADULT COMPLETE      DUPLEX CAROTID BILATERAL   3.  Left bundle-branch block I44.7 426.3 NUCLEAR CARDIAC STRESS TEST      ECHO ADULT COMPLETE      DUPLEX CAROTID BILATERAL   4. Mixed hyperlipidemia E78.2 272.2 AMB POC EKG ROUTINE W/ 12 LEADS, INTER & REP      NUCLEAR CARDIAC STRESS TEST      ECHO ADULT COMPLETE      DUPLEX CAROTID BILATERAL   5. S/P PTCA (percutaneous transluminal coronary angioplasty) Z98.61 V45.82 NUCLEAR CARDIAC STRESS TEST      ECHO ADULT COMPLETE      DUPLEX CAROTID BILATERAL     Orders Placed This Encounter    AMB POC EKG ROUTINE W/ 12 LEADS, INTER & REP     Order Specific Question:   Reason for Exam:     Answer:   Routine    DISCONTD: gabapentin (NEURONTIN) 100 mg capsule     Sig: Take 100 mg by mouth.  albuterol (PROVENTIL HFA, VENTOLIN HFA, PROAIR HFA) 90 mcg/actuation inhaler     Sig: INHALE 2 PUFFS BY MOUTH EVERY 4 HOURS AS NEEDED     Refill:  0    DISCONTD: metoprolol tartrate (LOPRESSOR) 25 mg tablet    DISCONTD: lisinopril-hydroCHLOROthiazide (PRINZIDE, ZESTORETIC) 20-12.5 mg per tablet     Sig: Take 2 Tabs by mouth daily. Increased 11/27/19     Dispense:  180 Tab     Refill:  0    olmesartan-hydroCHLOROthiazide (BENICAR HCT) 40-25 mg per tablet     Sig: Take 1 Tab by mouth daily. D/C lisinopril/hctz 11/27/19     Dispense:  90 Tab     Refill:  1        Plan:     1. Atherosclerotic heart disease remote history PTCA stenting of the proximal LAD she is reporting increasing dyspnea on exertion and fatigue will evaluate for ischemia with Lexiscan nuclear stress test  2. Hypertension: Elevated repeat at end of exam 148/74 she has been taking lisinopril 20/12.52 tabs daily, will discontinue and placed on olmesartan 40/25 1 tablet daily, continue all other antihypertensives  3. Dyspnea on exertion: Echocardiogram to evaluate for structural heart or reduced ejection fraction issues  4. Carotid calcifications: Seen on report 2011, will reevaluate with carotid duplex.   Follow-up with Dr. Jaren Amanda in 1 month sooner if abnormal nuclear stress test.    Kaleigh Neal NP      Please note that this dictation was completed with Dragon, the computer voice recognition software. Quite often unanticipated grammatical, syntax, homophones, and other interpretive errors are inadvertently transcribed by the computer software. Please disregard these errors. Please excuse any errors that have escaped final proofreading. Thank you.

## 2019-11-27 NOTE — PROGRESS NOTES
1. Have you been to the ER, urgent care clinic since your last visit? Hospitalized since your last visit? No    2. Have you seen or consulted any other health care providers outside of the [address] [place] [address] since your last visit? Include any pap smears or colon screening.  No    Chief Complaint   Patient presents with    Hypertension    Shortness of Breath    Palpitations       SOB:  On exertion - walking and going up the steps    Pt BP readings at home:  11/22: 172/94 HR: 74  11/16: 185/106  HR: 69  11/23:  155/87  HR: 71  11/24:  191/117  HR:  66    Palpitations:  Occasional

## 2019-12-13 ENCOUNTER — TELEPHONE (OUTPATIENT)
Dept: CARDIOLOGY CLINIC | Age: 79
End: 2019-12-13

## 2019-12-13 NOTE — TELEPHONE ENCOUNTER
Called patient to confirm Nuclear stress test for 12/17. Message left confirming appointment time and reminder to hold all caffeine containing food, beverages and medicines for 24 hours.

## 2019-12-20 ENCOUNTER — TELEPHONE (OUTPATIENT)
Dept: CARDIOLOGY CLINIC | Age: 79
End: 2019-12-20

## 2019-12-20 DIAGNOSIS — I10 BENIGN ESSENTIAL HYPERTENSION: Primary | ICD-10-CM

## 2019-12-20 RX ORDER — FUROSEMIDE 20 MG/1
20 TABLET ORAL DAILY
Qty: 30 TAB | Refills: 1 | Status: SHIPPED | OUTPATIENT
Start: 2019-12-20 | End: 2020-01-12

## 2019-12-20 RX ORDER — OLMESARTAN MEDOXOMIL 40 MG/1
40 TABLET ORAL DAILY
Qty: 30 TAB | Refills: 1 | Status: SHIPPED | OUTPATIENT
Start: 2019-12-20 | End: 2020-01-12

## 2019-12-20 RX ORDER — FUROSEMIDE 20 MG/1
TABLET ORAL DAILY
COMMUNITY
End: 2019-12-20 | Stop reason: SDUPTHER

## 2019-12-20 RX ORDER — OLMESARTAN MEDOXOMIL 40 MG/1
TABLET ORAL DAILY
COMMUNITY
End: 2019-12-20 | Stop reason: SDUPTHER

## 2019-12-20 NOTE — TELEPHONE ENCOUNTER
----- Message from Amor Moon NP sent at 12/19/2019  3:40 PM EST -----  Kary: Please call patient I sent her the my chart message below. She has grade 2 diastolic dysfunction she would do better with coming off of hydrochlorothiazide and be placed on furosemide 20 mg daily. If she is okay with this plan she will need a prescription for plain olmesartan 40 mg daily, furosemide 20 mg daily and discontinue olmesartan 40/25 mg. She should follow-up with [facility] in about a month for symptom reassessment. I also recommend she have a BMP repeated in 1 month      Good afternoon Ms. Fortino Wray. The echocardiogram of your heart shows that there is normal pumping strength but there is some overall stiffness of the left ventricle which may be contributing to your shortness of breath. Your nuclear stress test was negative for blockages. I would like to make a medication change, Dr. Latoya Miguel nurse Fer Arguelles will be calling to discuss. I want to keep you on olmesartan 40 mg but in place of using 25 mg of hydrochlorothiazide I would like to place you on a different kind of fluid pill called furosemide 20 mg daily. You will have to take olmesartan 40 mg by itself which will need a new prescription along with furosemide 20 mg daily which she will need a new prescription for. Once Kary talks to you will send new RX.

## 2019-12-23 LAB
LEFT CCA DIST DIAS: 17 CM/S
LEFT CCA DIST SYS: 89 CM/S
LEFT CCA MID DIAS: 15 CM/S
LEFT CCA MID SYS: 77 CM/S
LEFT CCA PROX DIAS: 16 CM/S
LEFT CCA PROX SYS: 76 CM/S
LEFT ECA DIAS: 7 CM/S
LEFT ECA SYS: 76 CM/S
LEFT ICA DIST DIAS: 29 CM/S
LEFT ICA DIST SYS: 83 CM/S
LEFT ICA MID DIAS: 30 CM/S
LEFT ICA MID SYS: 96 CM/S
LEFT ICA PROX DIAS: 22 CM/S
LEFT ICA PROX SYS: 82 CM/S
LEFT ICA/CCA SYS: 1.07
LEFT SUBCLAVIAN SYS: 91 CM/S
LEFT VERTEBRAL DIAS: 11 CM/S
LEFT VERTEBRAL SYS: 65 CM/S
RIGHT CCA DIST DIAS: 13 CM/S
RIGHT CCA DIST SYS: 61 CM/S
RIGHT CCA MID DIAS: 18 CM/S
RIGHT CCA MID SYS: 72 CM/S
RIGHT CCA PROX DIAS: 18 CM/S
RIGHT CCA PROX SYS: 93 CM/S
RIGHT ECA DIAS: 15 CM/S
RIGHT ECA SYS: 107 CM/S
RIGHT ICA DIST DIAS: 24 CM/S
RIGHT ICA DIST SYS: 80 CM/S
RIGHT ICA MID DIAS: 31 CM/S
RIGHT ICA MID SYS: 117 CM/S
RIGHT ICA PROX DIAS: 28 CM/S
RIGHT ICA PROX SYS: 112 CM/S
RIGHT ICA/CCA SYS: 1.25
RIGHT SUBCLAVIAN SYS: 86 CM/S
RIGHT VERTEBRAL DIAS: 11 CM/S
RIGHT VERTEBRAL SYS: 49 CM/S

## 2019-12-23 RX ORDER — APIXABAN 5 MG/1
TABLET, FILM COATED ORAL
Qty: 60 TAB | Refills: 3 | Status: SHIPPED | OUTPATIENT
Start: 2019-12-23 | End: 2020-05-03

## 2019-12-24 LAB
BUN SERPL-MCNC: 14 MG/DL (ref 8–27)
BUN/CREAT SERPL: 18 (ref 12–28)
CALCIUM SERPL-MCNC: 9.5 MG/DL (ref 8.7–10.3)
CHLORIDE SERPL-SCNC: 98 MMOL/L (ref 96–106)
CO2 SERPL-SCNC: 26 MMOL/L (ref 20–29)
CREAT SERPL-MCNC: 0.79 MG/DL (ref 0.57–1)
GLUCOSE SERPL-MCNC: 87 MG/DL (ref 65–99)
POTASSIUM SERPL-SCNC: 4.4 MMOL/L (ref 3.5–5.2)
SODIUM SERPL-SCNC: 139 MMOL/L (ref 134–144)

## 2019-12-24 NOTE — PROGRESS NOTES
Good morning Mrs. Mendoza Pointer. The carotid ultrasound of the arteries in your neck look good there are no significant blockages.   Regards Nathalie Jauregui NP    My chart message sent

## 2019-12-27 ENCOUNTER — TELEPHONE (OUTPATIENT)
Dept: CARDIOLOGY CLINIC | Age: 79
End: 2019-12-27

## 2019-12-27 NOTE — TELEPHONE ENCOUNTER
----- Message from Rosamaria Strickland NP sent at 12/26/2019  9:19 AM EST -----  Labs are good---stable kidney function.  Continue Olmesartan

## 2019-12-27 NOTE — TELEPHONE ENCOUNTER
Spoke with patient. Verified patient with two patient identifiers. Advised labs stable, no med changes . Patient verbalized understanding.

## 2019-12-30 ENCOUNTER — TELEPHONE (OUTPATIENT)
Dept: CARDIOLOGY CLINIC | Age: 79
End: 2019-12-30

## 2019-12-30 NOTE — TELEPHONE ENCOUNTER
----- Message from Tory Ojeda NP sent at 12/26/2019  9:19 AM EST -----  Labs are good---stable kidney function.  Continue Olmesartan

## 2020-01-13 RX ORDER — FUROSEMIDE 20 MG/1
TABLET ORAL
Qty: 90 TAB | Refills: 3 | Status: SHIPPED | OUTPATIENT
Start: 2020-01-13 | End: 2020-02-25

## 2020-01-13 RX ORDER — OLMESARTAN MEDOXOMIL 40 MG/1
TABLET ORAL
Qty: 90 TAB | Refills: 3 | Status: SHIPPED | OUTPATIENT
Start: 2020-01-13 | End: 2020-01-23

## 2020-01-23 ENCOUNTER — OFFICE VISIT (OUTPATIENT)
Dept: CARDIOLOGY CLINIC | Age: 80
End: 2020-01-23

## 2020-01-23 VITALS
HEIGHT: 63 IN | SYSTOLIC BLOOD PRESSURE: 144 MMHG | HEART RATE: 58 BPM | DIASTOLIC BLOOD PRESSURE: 80 MMHG | WEIGHT: 185.5 LBS | OXYGEN SATURATION: 95 % | RESPIRATION RATE: 16 BRPM | BODY MASS INDEX: 32.87 KG/M2

## 2020-01-23 DIAGNOSIS — Z98.61 S/P PTCA (PERCUTANEOUS TRANSLUMINAL CORONARY ANGIOPLASTY): ICD-10-CM

## 2020-01-23 DIAGNOSIS — I10 BENIGN ESSENTIAL HYPERTENSION: ICD-10-CM

## 2020-01-23 DIAGNOSIS — I25.10 ASHD (ARTERIOSCLEROTIC HEART DISEASE): Primary | ICD-10-CM

## 2020-01-23 DIAGNOSIS — I48.0 PAF (PAROXYSMAL ATRIAL FIBRILLATION) (HCC): ICD-10-CM

## 2020-01-23 DIAGNOSIS — E78.2 MIXED HYPERLIPIDEMIA: ICD-10-CM

## 2020-01-23 DIAGNOSIS — I44.7 LEFT BUNDLE-BRANCH BLOCK: ICD-10-CM

## 2020-01-23 RX ORDER — GABAPENTIN 100 MG/1
100 CAPSULE ORAL 3 TIMES DAILY
COMMUNITY
Start: 2020-01-21

## 2020-01-23 RX ORDER — OLMESARTAN MEDOXOMIL 40 MG/1
40 TABLET ORAL DAILY
Qty: 90 TAB | Refills: 1 | Status: SHIPPED | OUTPATIENT
Start: 2020-01-23 | End: 2020-07-15

## 2020-01-23 RX ORDER — OLMESARTAN MEDOXOMIL 40 MG/1
20 TABLET ORAL DAILY
Qty: 90 TAB | Refills: 1 | Status: SHIPPED | OUTPATIENT
Start: 2020-01-23 | End: 2020-01-23

## 2020-01-23 RX ORDER — CHLORTHALIDONE 25 MG/1
12.5 TABLET ORAL DAILY
Qty: 45 TAB | Refills: 1 | Status: SHIPPED | OUTPATIENT
Start: 2020-01-23 | End: 2020-02-25

## 2020-01-23 NOTE — LETTER
1/23/20 Patient: Celina Fitzpatrick YOB: 1940 Date of Visit: 1/23/2020 Henrietta Collado MD 
[id number] [address] [place] [address] VIA Facsimile: [phone] Dear Henrietta Collado MD, Thank you for referring Ms. Malini Hyde to [address] [place] [address] for evaluation. My notes for this consultation are attached. If you have questions, please do not hesitate to call me. I look forward to following your patient along with you.  
 
 
Sincerely, 
 
Koby Gonzalez MD

## 2020-01-23 NOTE — PROGRESS NOTES
[address], [facility]  [phone]     Subjective:      Sheryl Field is a 70 y.o. female presents for one mos follow up. Initially presented to clinic 11/19 for symptom based visit: paul, fatigue and elevated BP readings. We obtained echo, NST and replaced her lisinopril/hctz with olmesartan/hctz. It has been working well up until we stopped her HCTZ and started her on lasix 20 mg d/t grade 2 DD found in her echo. The Lasix caused her swell as she was doing fine with Olmesartan/hctz combo. Still has mild paul, unchanged; wears compression stockings for leg swelling which helps     The patient denies chest pain, orthopnea, PND,  palpitations, syncope, or presyncope.        Patient Active Problem List    Diagnosis Date Noted    PAF (paroxysmal atrial fibrillation) ([email or web address] 10/12/2017    Benign essential hypertension 05/19/2012    Hyperlipidemia 05/19/2012    Depression 05/19/2012    Unstable angina pectoris ([facility] [address] 05/19/2012    Left bundle-branch block 05/19/2012    CAD (coronary artery disease) 05/19/2012    S/P PTCA (percutaneous transluminal coronary angioplasty) 05/19/2012      Dennis Hernandez MD  Past Medical History:   Diagnosis Date    CAD (coronary artery disease)     Family history of skin cancer     Hypercholesteremia     Hypertension     Psychiatric disorder     depression    Skin cancer       Past Surgical History:   Procedure Laterality Date    AMB POC MOHS 1 STAGE H/N/HF/G      CARDIAC CATHETERIZATION  5/19/2012         COLONOSCOPY N/A 6/19/2019    COLONOSCOPY performed by Sandra Houston MD at \[facility]"" ENDOSCOPY    DRUG ELUTING STENT SINGLE VESS  5/19/2012     Dr Jamie Alvarado    HX MOHS PROCEDURES  04/13/2017    [address] left superior helical rim by Dr. Kylah Diop  12/26/2017    BCC L nasal sidewall by Dr. Carrillo White [Amlodipine] Swelling      Family History   Problem Relation Age of Onset    Coronary Artery Disease Other         daughter stent age 28      Social History     Socioeconomic History    Marital status:      Spouse name: Not on file    Number of children: Not on file    Years of education: Not on file    Highest education level: Not on file   Occupational History    Not on file   Social Needs    Financial resource strain: Not on file    Food insecurity:     Worry: Not on file     Inability: Not on file    Transportation needs:     Medical: Not on file     Non-medical: Not on file   Tobacco Use    Smoking status: Former Smoker     Packs/day: 0.50     Years: 50.00     Pack years: 25.00     Last attempt to quit: 2012     Years since quittin.6    Smokeless tobacco: Never Used   Substance and Sexual Activity    Alcohol use: Yes     Comment: occasional    Drug use: No    Sexual activity: Not on file   Lifestyle    Physical activity:     Days per week: Not on file     Minutes per session: Not on file    Stress: Not on file   Relationships    Social connections:     Talks on phone: Not on file     Gets together: Not on file     Attends [employer] service: Not on file     Active member of club or organization: Not on file     Attends meetings of clubs or organizations: Not on file     Relationship status: Not on file    Intimate partner violence:     Fear of current or ex partner: Not on file     Emotionally abused: Not on file     Physically abused: Not on file     Forced sexual activity: Not on file   Other Topics Concern    Not on file   Social History Narrative    Not on file      Current Outpatient Medications   Medication Sig    olmesartan 40 mg tab 40 mg, hydroCHLOROthiazide 25 mg tab 25 mg Take  by mouth daily.  gabapentin (NEURONTIN) 100 mg capsule Take 100 mg by mouth three (3) times daily.  ELIQUIS 5 mg tablet TAKE 1 TAB BY MOUTH TWO (2) TIMES A DAY.     albuterol (PROVENTIL HFA, VENTOLIN HFA, PROAIR HFA) 90 mcg/actuation inhaler INHALE 2 PUFFS BY MOUTH EVERY 4 HOURS AS NEEDED    doxazosin (CARDURA) 1 mg tablet TAKE 1 TABLET BY MOUTH EVERY DAY    metoprolol succinate (TOPROL-XL) 50 mg XL tablet TAKE 1 TAB BY MOUTH NIGHTLY.  polyethylene glycol (MIRALAX) 17 gram/dose powder Take 17 g by mouth as needed.  calcium carbonate (OS-KYLE) 500 mg calcium (1,250 mg) tablet Take  by mouth daily.  nitroglycerin (NITROSTAT) 0.4 mg SL tablet 1 Tab by SubLINGual route every five (5) minutes as needed (call 911 if not relieved by 3).  magnesium oxide 500 mg tab Take  by mouth.  atorvastatin (LIPITOR) 20 mg tablet Take 1 Tab by mouth daily. Refills per Dr. Bam Johnson who is doing labs, please    cyanocobalamin 1,000 mcg tablet Take 1,000 mcg by mouth daily.  ZOLOFT 25 mg tablet Take 25 mg by mouth two (2) times a day.  b complex vitamins tablet Take 1 Tab by mouth daily.  esomeprazole (NEXIUM) 20 mg capsule Take 20 mg by mouth daily.  omega-3 fatty acids-vitamin e (FISH OIL) 1,000 mg Cap Take 1 Cap by mouth daily. 788mg daily    aspirin 81 mg chewable tablet Take 1 Tab by mouth daily.  furosemide (LASIX) 20 mg tablet TAKE 1 TABLET BY MOUTH EVERY DAY     No current facility-administered medications for this visit. Review of Symptoms:  11 systems reviewed, negative other than as stated in the HPI    Physical ExamPhysical Exam:    Vitals:    01/23/20 1031 01/23/20 1051 01/23/20 1116   BP: 170/88 (!) 152/92 144/80   Pulse: (!) 58     Resp: 16     SpO2: 95%     Weight: 185 lb 8 oz (84.1 kg)     Height: 5' 3\" (1.6 m)       Body mass index is 32.86 kg/m². General PE  Gen:  NAD  Mental Status - Alert. General Appearance - Not in acute distress. HEENT:  PERRL, no carotid bruits or JVD  Chest and Lung Exam   Inspection: Accessory muscles - No use of accessory muscles in breathing.    Auscultation:   Breath sounds: - Normal.   Cardiovascular   Inspection: Jugular vein - Bilateral - Inspection Normal.   Palpation/Percussion:   Apical Impulse: - Normal.   Auscultation: Rhythm - Regular. Heart Sounds - S1 WNL and S2 WNL. No S3 or S4. Murmurs & Other Heart Sounds: Auscultation of the heart reveals - No Murmurs. Peripheral Vascular   Upper Extremity: Inspection - Bilateral - No Cyanotic nailbeds or Digital clubbing. Lower Extremity:   Palpation: Edema - Bilateral - No edema. Abdomen:   Soft, non-tender, bowel sounds are active.   Neuro: A&O times 3, CN and motor grossly WNL    Labs:   Lab Results   Component Value Date/Time    Cholesterol, total 161 10/16/2014 10:38 AM    Cholesterol, total 168 09/17/2013 12:00 AM    Cholesterol, total 172 01/17/2013 10:41 AM    Cholesterol, total 164 09/05/2012 10:30 AM    Cholesterol, total 131 05/20/2012 03:50 AM    HDL Cholesterol 85 10/16/2014 10:38 AM    HDL Cholesterol 82 09/17/2013 12:00 AM    HDL Cholesterol 84 01/17/2013 10:41 AM    HDL Cholesterol 65 09/05/2012 10:30 AM    HDL Cholesterol 48 05/20/2012 03:50 AM    LDL, calculated 65 10/16/2014 10:38 AM    LDL, calculated 70 09/17/2013 12:00 AM    LDL, calculated 66 01/17/2013 10:41 AM    LDL, calculated 66 09/05/2012 10:30 AM    LDL, calculated 46.8 05/20/2012 03:50 AM    Triglyceride 56 10/16/2014 10:38 AM    Triglyceride 79 09/17/2013 12:00 AM    Triglyceride 111 01/17/2013 10:41 AM    Triglyceride 167 (H) 09/05/2012 10:30 AM    Triglyceride 181 (H) 05/20/2012 03:50 AM    CHOL/HDL Ratio 2.7 05/20/2012 03:50 AM     Lab Results   Component Value Date/Time    CK 77 04/18/2013 02:41 AM     Lab Results   Component Value Date/Time    Sodium 139 12/23/2019 09:58 AM    Potassium 4.4 12/23/2019 09:58 AM    Chloride 98 12/23/2019 09:58 AM    CO2 26 12/23/2019 09:58 AM    Anion gap 9 09/12/2014 01:25 PM    Glucose 87 12/23/2019 09:58 AM    BUN 14 12/23/2019 09:58 AM    Creatinine 0.79 12/23/2019 09:58 AM    BUN/Creatinine ratio 18 12/23/2019 09:58 AM    GFR est AA 84 12/23/2019 09:58 AM    GFR est non-AA 72 12/23/2019 09:58 AM    Calcium 9.5 12/23/2019 09:58 AM    Bilirubin, total 0.6 10/17/2017 03:12 PM    AST (SGOT) 17 10/17/2017 03:12 PM    Alk. phosphatase 93 10/17/2017 03:12 PM    Protein, total 6.9 10/17/2017 03:12 PM    Albumin 4.3 10/17/2017 03:12 PM    Globulin 3.7 09/12/2014 01:25 PM    A-G Ratio 1.7 10/17/2017 03:12 PM    ALT (SGPT) 11 10/17/2017 03:12 PM       EKG:  NSR LBBB     Assessment:     Assessment:      1. ASHD (arteriosclerotic heart disease)    2. Benign essential hypertension    3. Left bundle-branch block    4. S/P PTCA (percutaneous transluminal coronary angioplasty)    5. Mixed hyperlipidemia    6. PAF (paroxysmal atrial fibrillation) ([facility] [address]        Orders Placed This Encounter    AMB POC EKG ROUTINE W/ 12 LEADS, INTER & REP     Order Specific Question:   Reason for Exam:     Answer:   routine    olmesartan 40 mg tab 40 mg, hydroCHLOROthiazide 25 mg tab 25 mg     Sig: Take  by mouth daily.  gabapentin (NEURONTIN) 100 mg capsule     Sig: Take 100 mg by mouth three (3) times daily. Plan:     Patient presents for follow up. Presented to clinic 11/19 for symptom based visit: reyna, fatigue and elevated BP readings. We obtained echo, NST and replaced her lisinopril/hctz with olmesartan/hctz. It has been working well up until we stopped her HCTZ and started her on lasix 20 mg d/t grade 2 DD found in her echo. The Lasix caused her swell as she was doing fine with Olmesartan/hctz combo. ASHD, remote PTCA CLEMENTE pox LAD 05'12  Negative NST in 12/19  Negative nuclear stress test in 12/14  Mild unchanged REYNA. Stable.  Continue ASA, BB, statin       Hypertension  Home BP reading this am 128/62, but she states blood pressure is usually in the 150s over 85 at home  She did worse with Lasix then HCTZ, but due to uncontrolled blood pressure, change HCTZ to chlorthalidone  Recheck electrolytes in 2 weeks, follow-up for blood pressure check in 1 month        PAF  Normal EF grade 2 DD mild MR moderate TR per echo 12/19  Holter 10/17: Palpitations correspond to PAF with RVR. Maintaining sinus rhythm with metoprolol XL continue current therapy.    Tolerating Eliquis without bruising or bleeding. Discussed risks and benefits of anticoagulation, signs and symptoms of bleeding, and to call if any concerns. HLD   12/19 LDL at 68. On statin. Lipids and labs followed by PCP.      Carotid calcifications: Seen on report 2011  Mild stenosis left and right ICA per carotid duplex in 12/19       Hx Leg burning with exercise, venous varicosities, rule out claudication:  Previously ordered ABIs and venous reflux study---not done-defer at this time since she is not complaining of leg symptoms    Continue current care and f/u in 1 month for blood pressure check, determine MD follow-up at that point.       Edi Guzman MD

## 2020-01-23 NOTE — PROGRESS NOTES
1. Have you been to the ER, urgent care clinic since your last visit? Hospitalized since your last visit? No.    2. Have you seen or consulted any other health care providers outside of the [address] [place] [address] since your last visit? Include any pap smears or colon screening.    No.      Chief Complaint   Patient presents with    Results     1 month- soboe, BP this am at home 128/62

## 2020-02-06 DIAGNOSIS — I44.7 LEFT BUNDLE-BRANCH BLOCK: ICD-10-CM

## 2020-02-06 DIAGNOSIS — I25.10 ASHD (ARTERIOSCLEROTIC HEART DISEASE): ICD-10-CM

## 2020-02-06 DIAGNOSIS — E78.2 MIXED HYPERLIPIDEMIA: ICD-10-CM

## 2020-02-06 DIAGNOSIS — Z98.61 S/P PTCA (PERCUTANEOUS TRANSLUMINAL CORONARY ANGIOPLASTY): ICD-10-CM

## 2020-02-06 DIAGNOSIS — I48.0 PAF (PAROXYSMAL ATRIAL FIBRILLATION) (HCC): ICD-10-CM

## 2020-02-06 DIAGNOSIS — I10 BENIGN ESSENTIAL HYPERTENSION: ICD-10-CM

## 2020-02-08 LAB
BUN SERPL-MCNC: 17 MG/DL (ref 8–27)
BUN/CREAT SERPL: 23 (ref 12–28)
CALCIUM SERPL-MCNC: 9.9 MG/DL (ref 8.7–10.3)
CHLORIDE SERPL-SCNC: 93 MMOL/L (ref 96–106)
CO2 SERPL-SCNC: 28 MMOL/L (ref 20–29)
CREAT SERPL-MCNC: 0.73 MG/DL (ref 0.57–1)
GLUCOSE SERPL-MCNC: 88 MG/DL (ref 65–99)
MAGNESIUM SERPL-MCNC: 1.7 MG/DL (ref 1.6–2.3)
POTASSIUM SERPL-SCNC: 3.9 MMOL/L (ref 3.5–5.2)
SODIUM SERPL-SCNC: 136 MMOL/L (ref 134–144)

## 2020-02-09 NOTE — PROGRESS NOTES
Labs look great! Her kidney fxn stable with chlorthalidone and olmesartan  Hopefully her BP is better controlled. Continue same.

## 2020-02-10 ENCOUNTER — TELEPHONE (OUTPATIENT)
Dept: CARDIOLOGY CLINIC | Age: 80
End: 2020-02-10

## 2020-02-10 NOTE — TELEPHONE ENCOUNTER
----- Message from Kavon Mistry NP sent at 2/9/2020  5:15 PM EST -----  Labs look great! Her kidney fxn stable with chlorthalidone and olmesartan  Hopefully her BP is better controlled. Continue same.

## 2020-02-25 ENCOUNTER — OFFICE VISIT (OUTPATIENT)
Dept: CARDIOLOGY CLINIC | Age: 80
End: 2020-02-25

## 2020-02-25 VITALS
WEIGHT: 187.7 LBS | HEIGHT: 63 IN | BODY MASS INDEX: 33.26 KG/M2 | OXYGEN SATURATION: 97 % | DIASTOLIC BLOOD PRESSURE: 80 MMHG | SYSTOLIC BLOOD PRESSURE: 160 MMHG | HEART RATE: 60 BPM | RESPIRATION RATE: 16 BRPM

## 2020-02-25 DIAGNOSIS — I10 BENIGN ESSENTIAL HYPERTENSION: Primary | ICD-10-CM

## 2020-02-25 DIAGNOSIS — I48.0 PAF (PAROXYSMAL ATRIAL FIBRILLATION) (HCC): ICD-10-CM

## 2020-02-25 DIAGNOSIS — E78.2 MIXED HYPERLIPIDEMIA: ICD-10-CM

## 2020-02-25 DIAGNOSIS — Z98.61 S/P PTCA (PERCUTANEOUS TRANSLUMINAL CORONARY ANGIOPLASTY): ICD-10-CM

## 2020-02-25 DIAGNOSIS — I25.10 ASHD (ARTERIOSCLEROTIC HEART DISEASE): ICD-10-CM

## 2020-02-25 RX ORDER — CHLORTHALIDONE 25 MG/1
25 TABLET ORAL DAILY
Qty: 90 TAB | Refills: 1 | Status: SHIPPED | OUTPATIENT
Start: 2020-02-25 | End: 2020-07-15 | Stop reason: SINTOL

## 2020-02-25 NOTE — PROGRESS NOTES
1. Have you been to the ER, urgent care clinic since your last visit? Hospitalized since your last visit? No    2. Have you seen or consulted any other health care providers outside of the [address] [place] [address] since your last visit? Include any pap smears or colon screening. No    Chief Complaint   Patient presents with    Follow-up    Hypertension     Home -114/91-56 since last visit.  She is concerned with her medications not sure getting the exact dose with [place] and had heard Doxazosin and Olmesartan have adverse refects with taking together

## 2020-02-25 NOTE — PROGRESS NOTES
[address] [place] [address], [address] [facility]  [phone]     Subjective:      Angeles Jon is a 70 y.o. female presents to clinic for BP check. Last seen by us in 1/23/2020. At that time we dc her Hctz and switched her to Chlorthalidone for better BP control. Today her BP is up, 160/80. She has a hard time cutting the pill, so unsure how much shes taking. The patient denies chest pain/ shortness of breath, orthopnea, PND, LE edema, palpitations, syncope, or presyncope.        Patient Active Problem List    Diagnosis Date Noted    PAF (paroxysmal atrial fibrillation) ([facility] [address] 10/12/2017    Benign essential hypertension 05/19/2012    Hyperlipidemia 05/19/2012    Depression 05/19/2012    Unstable angina pectoris ([facility] [address] 05/19/2012    Left bundle-branch block 05/19/2012    CAD (coronary artery disease) 05/19/2012    S/P PTCA (percutaneous transluminal coronary angioplasty) 05/19/2012      Ana Buckner MD  Past Medical History:   Diagnosis Date    CAD (coronary artery disease)     Family history of skin cancer     Hypercholesteremia     Hypertension     Psychiatric disorder     depression    Skin cancer       Past Surgical History:   Procedure Laterality Date    AMB POC MOHS 1 STAGE H/N/HF/G      CARDIAC CATHETERIZATION  5/19/2012         COLONOSCOPY N/A 6/19/2019    COLONOSCOPY performed by Lupe Garcia MD at [facility] ENDOSCOPY    DRUG ELUTING STENT SINGLE VESS  5/19/2012     Dr Lamar Zabala    HX [address]  04/13/2017    [address] left superior helical rim by Dr. Summer Ferrera  12/26/2017    BCC L nasal sidewall by Dr. Linda Lala [Amlodipine] Swelling      Family History   Problem Relation Age of Onset    Coronary Artery Disease Other         daughter stent age 28      Social History     Socioeconomic History    Marital status:      Spouse name: Not on file    Number of children: Not on file    Years of education: Not on file    Highest education level: Not on file   Occupational History    Not on file   Social Needs    Financial resource strain: Not on file    Food insecurity:     Worry: Not on file     Inability: Not on file    Transportation needs:     Medical: Not on file     Non-medical: Not on file   Tobacco Use    Smoking status: Former Smoker     Packs/day: 0.50     Years: 50.00     Pack years: 25.00     Last attempt to quit: 2012     Years since quittin.7    Smokeless tobacco: Never Used   Substance and Sexual Activity    Alcohol use: Yes     Comment: occasional    Drug use: No    Sexual activity: Not on file   Lifestyle    Physical activity:     Days per week: Not on file     Minutes per session: Not on file    Stress: Not on file   Relationships    Social connections:     Talks on phone: Not on file     Gets together: Not on file     Attends [employer] service: Not on file     Active member of club or organization: Not on file     Attends meetings of clubs or organizations: Not on file     Relationship status: Not on file    Intimate partner violence:     Fear of current or ex partner: Not on file     Emotionally abused: Not on file     Physically abused: Not on file     Forced sexual activity: Not on file   Other Topics Concern    Not on file   Social History Narrative    Not on file      Current Outpatient Medications   Medication Sig    chlorthalidone (HYGROTEN) 25 mg tablet Take 1 Tab by mouth daily.  gabapentin (NEURONTIN) 100 mg capsule Take 100 mg by mouth three (3) times daily.  olmesartan (BENICAR) 40 mg tablet Take 1 Tab by mouth daily.  ELIQUIS 5 mg tablet TAKE 1 TAB BY MOUTH TWO (2) TIMES A DAY.     albuterol (PROVENTIL HFA, VENTOLIN HFA, PROAIR HFA) 90 mcg/actuation inhaler INHALE 2 PUFFS BY MOUTH EVERY 4 HOURS AS NEEDED    doxazosin (CARDURA) 1 mg tablet TAKE 1 TABLET BY MOUTH EVERY DAY    metoprolol succinate (TOPROL-XL) 50 mg XL tablet TAKE 1 TAB BY MOUTH NIGHTLY.  polyethylene glycol (MIRALAX) 17 gram/dose powder Take 17 g by mouth as needed.  calcium carbonate (OS-KYLE) 500 mg calcium (1,250 mg) tablet Take  by mouth daily.  nitroglycerin (NITROSTAT) 0.4 mg SL tablet 1 Tab by SubLINGual route every five (5) minutes as needed (call 911 if not relieved by 3).  magnesium oxide 500 mg tab Take  by mouth.  atorvastatin (LIPITOR) 20 mg tablet Take 1 Tab by mouth daily. Refills per Dr. Samantha Dowling who is doing labs, please    cyanocobalamin 1,000 mcg tablet Take 1,000 mcg by mouth daily.  ZOLOFT 25 mg tablet Take 25 mg by mouth two (2) times a day.  b complex vitamins tablet Take 1 Tab by mouth daily.  esomeprazole (NEXIUM) 20 mg capsule Take 20 mg by mouth daily.  omega-3 fatty acids-vitamin e (FISH OIL) 1,000 mg Cap Take 1 Cap by mouth daily. 788mg daily    aspirin 81 mg chewable tablet Take 1 Tab by mouth daily. No current facility-administered medications for this visit. Review of Symptoms:  11 systems reviewed, negative other than as stated in the HPI    Physical ExamPhysical Exam:    Vitals:    02/25/20 1302   BP: 160/80   Pulse: 60   Resp: 16   SpO2: 97%   Weight: 187 lb 11.2 oz (85.1 kg)   Height: 5' 3\" (1.6 m)     Body mass index is 33.25 kg/m². General PE  Gen:  NAD  Mental Status - Alert. General Appearance - Not in acute distress. HEENT:  PERRL, no carotid bruits or JVD  Chest and Lung Exam   Inspection: Accessory muscles - No use of accessory muscles in breathing. Auscultation:   Breath sounds: - Normal.   Cardiovascular   Inspection: Jugular vein - Bilateral - Inspection Normal.   Palpation/Percussion:   Apical Impulse: - Normal.   Auscultation: Rhythm - Regular. Heart Sounds - S1 WNL and S2 WNL. No S3 or S4. Murmurs & Other Heart Sounds: Auscultation of the heart reveals - No Murmurs. Peripheral Vascular   Upper Extremity: Inspection - Bilateral - No Cyanotic nailbeds or Digital clubbing.    Lower Extremity:   Palpation: Edema - Bilateral - No edema. Abdomen:   Soft, non-tender, bowel sounds are active. Neuro: A&O times 3, CN and motor grossly WNL    Labs:   Lab Results   Component Value Date/Time    Cholesterol, total 161 10/16/2014 10:38 AM    Cholesterol, total 168 09/17/2013 12:00 AM    Cholesterol, total 172 01/17/2013 10:41 AM    Cholesterol, total 164 09/05/2012 10:30 AM    Cholesterol, total 131 05/20/2012 03:50 AM    HDL Cholesterol 85 10/16/2014 10:38 AM    HDL Cholesterol 82 09/17/2013 12:00 AM    HDL Cholesterol 84 01/17/2013 10:41 AM    HDL Cholesterol 65 09/05/2012 10:30 AM    HDL Cholesterol 48 05/20/2012 03:50 AM    LDL, calculated 65 10/16/2014 10:38 AM    LDL, calculated 70 09/17/2013 12:00 AM    LDL, calculated 66 01/17/2013 10:41 AM    LDL, calculated 66 09/05/2012 10:30 AM    LDL, calculated 46.8 05/20/2012 03:50 AM    Triglyceride 56 10/16/2014 10:38 AM    Triglyceride 79 09/17/2013 12:00 AM    Triglyceride 111 01/17/2013 10:41 AM    Triglyceride 167 (H) 09/05/2012 10:30 AM    Triglyceride 181 (H) 05/20/2012 03:50 AM    CHOL/HDL Ratio 2.7 05/20/2012 03:50 AM     Lab Results   Component Value Date/Time    CK 77 04/18/2013 02:41 AM     Lab Results   Component Value Date/Time    Sodium 136 02/07/2020 11:11 AM    Potassium 3.9 02/07/2020 11:11 AM    Chloride 93 (L) 02/07/2020 11:11 AM    CO2 28 02/07/2020 11:11 AM    Anion gap 9 09/12/2014 01:25 PM    Glucose 88 02/07/2020 11:11 AM    BUN 17 02/07/2020 11:11 AM    Creatinine 0.73 02/07/2020 11:11 AM    BUN/Creatinine ratio 23 02/07/2020 11:11 AM    GFR est AA 92 02/07/2020 11:11 AM    GFR est non-AA 80 02/07/2020 11:11 AM    Calcium 9.9 02/07/2020 11:11 AM    Bilirubin, total 0.6 10/17/2017 03:12 PM    AST (SGOT) 17 10/17/2017 03:12 PM    Alk.  phosphatase 93 10/17/2017 03:12 PM    Protein, total 6.9 10/17/2017 03:12 PM    Albumin 4.3 10/17/2017 03:12 PM    Globulin 3.7 09/12/2014 01:25 PM    A-G Ratio 1.7 10/17/2017 03:12 PM    ALT (SGPT) 11 10/17/2017 03:12 PM       EKG:       Assessment:     Assessment:      1. Benign essential hypertension    2. ASHD (arteriosclerotic heart disease)    3. S/P PTCA (percutaneous transluminal coronary angioplasty)    4. Mixed hyperlipidemia    5. PAF (paroxysmal atrial fibrillation) ([facility] [address]        Orders Placed This Encounter    MAGNESIUM     Standing Status:   Future     Standing Expiration Date:   8/95/9240    METABOLIC PANEL, BASIC     Standing Status:   Future     Standing Expiration Date:   8/25/2020    chlorthalidone (HYGROTEN) 25 mg tablet     Sig: Take 1 Tab by mouth daily. Dispense:  90 Tab     Refill:  1        Plan:     Patient presents to clinic for BP check. Last seen by us in 1/23/2020. At that time we dc her Hctz and switched her to Chlorthalidone for better BP control. Today her BP is up, 160/80. She has a hard time cutting the pill, so unsure how much shes taking. Hypertension  Remains elevated--162/82 -   Will increase Chlorthalidone to 25 mg daily, repeat labs in 2 weeks  Kidney pete smith stable 2/7/2020  Recall: She did worse with Lasix then HCTZ, but due to uncontrolled blood pressure, change HCTZ to chlorthalidone  She will check her bp at home and will call us if it consistently runs >140/85. She does not want to come back for another  BP check in another month. ASHD, remote PTCA CLEMENTE pox LAD 05'12  Negative NST in 12/19  Negative nuclear stress test in 12/14  Mild unchanged REYNA. Stable. Continue ASA, BB, statin       PAF  Normal EF grade 2 DD mild MR moderate TR per echo 12/19  Holter 10/17: Palpitations correspond to PAF with RVR. Maintaining sinus rhythm with metoprolol XL continue current therapy. Tolerating Eliquis without bruising or bleeding. Discussed risks and benefits of anticoagulation, signs and symptoms of bleeding, and to call if any concerns. HLD   12/19 LDL at 68. On statin. Lipids and labs followed by PCP.       Carotid calcifications: Seen on report 2011  Mild stenosis left and right ICA per carotid duplex in 12/19        Hx Leg burning with exercise, venous varicosities, rule out claudication:  Previously ordered ABIs and venous reflux study---not done-defer at this time since she is not complaining of leg symptoms      Continue current care and f/u in July  with Dr Igor Euceda, NP

## 2020-03-10 LAB
BUN SERPL-MCNC: 15 MG/DL (ref 8–27)
BUN/CREAT SERPL: 15 (ref 12–28)
CALCIUM SERPL-MCNC: 9.3 MG/DL (ref 8.7–10.3)
CHLORIDE SERPL-SCNC: 92 MMOL/L (ref 96–106)
CO2 SERPL-SCNC: 26 MMOL/L (ref 20–29)
CREAT SERPL-MCNC: 0.99 MG/DL (ref 0.57–1)
GLUCOSE SERPL-MCNC: 84 MG/DL (ref 65–99)
INTERPRETATION: NORMAL
MAGNESIUM SERPL-MCNC: 1.6 MG/DL (ref 1.6–2.3)
POTASSIUM SERPL-SCNC: 4 MMOL/L (ref 3.5–5.2)
SODIUM SERPL-SCNC: 136 MMOL/L (ref 134–144)

## 2020-03-11 ENCOUNTER — TELEPHONE (OUTPATIENT)
Dept: CARDIOLOGY CLINIC | Age: 80
End: 2020-03-11

## 2020-03-11 DIAGNOSIS — I10 BENIGN ESSENTIAL HYPERTENSION: Primary | ICD-10-CM

## 2020-03-11 NOTE — TELEPHONE ENCOUNTER
----- Message from Kavon Mistry NP sent at 3/10/2020  8:57 AM EDT -----  Kidney fxn up some b I would like to repeat lab in 1 mos. Also, can you find out if she was ever on amlodipine?

## 2020-03-11 NOTE — TELEPHONE ENCOUNTER
Patient informed patient C/O swelling with amlodipine was started on Cardura 1 mg . Her current -95/ 60-50.  Labs slip mailed for a repeat BMP in 1 month

## 2020-03-12 DIAGNOSIS — I25.10 ASHD (ARTERIOSCLEROTIC HEART DISEASE): ICD-10-CM

## 2020-03-12 DIAGNOSIS — Z98.61 S/P PTCA (PERCUTANEOUS TRANSLUMINAL CORONARY ANGIOPLASTY): ICD-10-CM

## 2020-03-12 DIAGNOSIS — I10 BENIGN ESSENTIAL HYPERTENSION: ICD-10-CM

## 2020-03-12 DIAGNOSIS — I48.0 PAF (PAROXYSMAL ATRIAL FIBRILLATION) (HCC): ICD-10-CM

## 2020-03-12 DIAGNOSIS — E78.2 MIXED HYPERLIPIDEMIA: ICD-10-CM

## 2020-04-11 DIAGNOSIS — I10 BENIGN ESSENTIAL HYPERTENSION: ICD-10-CM

## 2020-04-15 ENCOUNTER — TELEPHONE (OUTPATIENT)
Dept: CARDIOLOGY CLINIC | Age: 80
End: 2020-04-15

## 2020-04-15 LAB
BUN SERPL-MCNC: 16 MG/DL (ref 8–27)
BUN/CREAT SERPL: 21 (ref 12–28)
CALCIUM SERPL-MCNC: 9.5 MG/DL (ref 8.7–10.3)
CHLORIDE SERPL-SCNC: 92 MMOL/L (ref 96–106)
CO2 SERPL-SCNC: 27 MMOL/L (ref 20–29)
CREAT SERPL-MCNC: 0.78 MG/DL (ref 0.57–1)
GLUCOSE SERPL-MCNC: 88 MG/DL (ref 65–99)
POTASSIUM SERPL-SCNC: 4.2 MMOL/L (ref 3.5–5.2)
SODIUM SERPL-SCNC: 138 MMOL/L (ref 134–144)

## 2020-04-15 NOTE — TELEPHONE ENCOUNTER
----- Message from Nguyễn Patterson NP sent at 4/15/2020  9:39 AM EDT -----  Labs are good with increased dose of chlorthalidone, continue

## 2020-07-15 ENCOUNTER — TELEPHONE (OUTPATIENT)
Dept: CARDIOLOGY CLINIC | Age: 80
End: 2020-07-15

## 2020-07-15 ENCOUNTER — OFFICE VISIT (OUTPATIENT)
Dept: CARDIOLOGY CLINIC | Age: 80
End: 2020-07-15

## 2020-07-15 VITALS
WEIGHT: 186.4 LBS | SYSTOLIC BLOOD PRESSURE: 160 MMHG | HEART RATE: 60 BPM | BODY MASS INDEX: 33.03 KG/M2 | OXYGEN SATURATION: 96 % | RESPIRATION RATE: 16 BRPM | HEIGHT: 63 IN | DIASTOLIC BLOOD PRESSURE: 90 MMHG

## 2020-07-15 DIAGNOSIS — I25.10 ASHD (ARTERIOSCLEROTIC HEART DISEASE): Primary | ICD-10-CM

## 2020-07-15 DIAGNOSIS — Z98.61 S/P PTCA (PERCUTANEOUS TRANSLUMINAL CORONARY ANGIOPLASTY): ICD-10-CM

## 2020-07-15 DIAGNOSIS — I25.10 CORONARY ARTERY DISEASE INVOLVING NATIVE CORONARY ARTERY OF NATIVE HEART WITHOUT ANGINA PECTORIS: ICD-10-CM

## 2020-07-15 DIAGNOSIS — I48.0 PAF (PAROXYSMAL ATRIAL FIBRILLATION) (HCC): ICD-10-CM

## 2020-07-15 DIAGNOSIS — E78.2 MIXED HYPERLIPIDEMIA: ICD-10-CM

## 2020-07-15 DIAGNOSIS — I10 BENIGN ESSENTIAL HYPERTENSION: ICD-10-CM

## 2020-07-15 RX ORDER — OLMESARTAN MEDOXOMIL AND HYDROCHLOROTHIAZIDE 40/25 40; 25 MG/1; MG/1
1 TABLET ORAL DAILY
Qty: 90 TAB | Refills: 4 | Status: SHIPPED | OUTPATIENT
Start: 2020-07-15 | End: 2021-07-12

## 2020-07-15 RX ORDER — AMLODIPINE BESYLATE 2.5 MG/1
2.5 TABLET ORAL DAILY
Qty: 30 TAB | Refills: 2 | Status: SHIPPED | OUTPATIENT
Start: 2020-07-15 | End: 2020-07-15 | Stop reason: ALTCHOICE

## 2020-07-15 RX ORDER — OLMESARTAN MEDOXOMIL AND HYDROCHLOROTHIAZIDE 40/25 40; 25 MG/1; MG/1
1 TABLET ORAL DAILY
COMMUNITY
End: 2020-07-15 | Stop reason: SDUPTHER

## 2020-07-15 NOTE — LETTER
7/15/20 Patient: Faustina Starr YOB: 1940 Date of Visit: 7/15/2020 [place] 
[address] [address] [address] VIA Facsimile: [phone] Dear Dorisann Prader, MD, Thank you for referring Ms. Lucie Courtney to [address] [place] [address] for evaluation. My notes for this consultation are attached. If you have questions, please do not hesitate to call me. I look forward to following your patient along with you.  
 
 
Sincerely, 
 
Micaela Claros MD

## 2020-07-15 NOTE — PROGRESS NOTES
Chief Complaint   Patient presents with    Follow-up    Cholesterol Problem    Coronary Artery Disease    Hypertension       1. Have you been to the ER, urgent care clinic since your last visit? Hospitalized since your last visit? No    2. Have you seen or consulted any other health care providers outside of the [address] [place] [address] since your last visit? Include any pap smears or colon screening. Yes 6/2020 bladder infection. Patient has been monitoring BP since self medicating has questions about her kidney function.

## 2020-07-15 NOTE — PROGRESS NOTES
[address], [address] [facility]  [phone]     Subjective:      Snehal Sigala is a 68 y.o. female presents for follow up, last seen by us in 2/2020 for BP check. At that time we further increased her Chlorthalidone to 25 mg daily. Repeat labs were stable. When she got home, she started taking olmesartan/hctz combo PLUS the chlorhalidone 25 mg and   Felt dizzy with SBP in the 80s-90s. She also states some chest discomfort with Chlothalidone. TODAY, bp is elevated. She has dc chlorthalidone on her own and went back to olmesartan/hctz. NO further cp or sob. The patient denies chest pain/ shortness of breath, orthopnea, PND, LE edema, palpitations, syncope, or presyncope.        Patient Active Problem List    Diagnosis Date Noted    [facility] 10/12/2017    Benign essential hypertension 05/19/2012    Hyperlipidemia 05/19/2012    Depression 05/19/2012    Unstable angina pectoris ([facility] 05/19/2012    Left bundle-branch block 05/19/2012    CAD (coronary artery disease) 05/19/2012    S/P PTCA (percutaneous transluminal coronary angioplasty) 05/19/2012      Barb Stanton MD  Past Medical History:   Diagnosis Date    CAD (coronary artery disease)     Family history of skin cancer     Hypercholesteremia     Hypertension     Psychiatric disorder     depression    Skin cancer       Past Surgical History:   Procedure Laterality Date    AMB POC MOHS 1 STAGE H/N/HF/G      CARDIAC CATHETERIZATION  5/19/2012         COLONOSCOPY N/A 6/19/2019    COLONOSCOPY performed by Sabrina Adorno MD at [facility] ENDOSCOPY    DRUG ELUTING STENT SINGLE VESS  5/19/2012     Dr Ana Huber    HX MOHS PROCEDURES  04/13/2017    [facility] left superior helical rim by Dr. Erlinda Guajardo  12/26/2017    BCC L nasal sidewall by Dr. Ramin Crabtree      Allergies   Allergen Reactions    Hygroton Vertigo     Dizziness, hypotension, SOB , chest pain & swelling    Norvasc [Amlodipine] Swelling      Family History   Problem Relation Age of Onset    Coronary Artery Disease Other         daughter stent age 28      Social History     Socioeconomic History    Marital status:      Spouse name: Not on file    Number of children: Not on file    Years of education: Not on file    Highest education level: Not on file   Occupational History    Not on file   Social Needs    Financial resource strain: Not on file    Food insecurity     Worry: Not on file     Inability: Not on file    Transportation needs     Medical: Not on file     Non-medical: Not on file   Tobacco Use    Smoking status: Former Smoker     Packs/day: 0.50     Years: 50.00     Pack years: 25.00     Last attempt to quit: 2012     Years since quittin.1    Smokeless tobacco: Never Used   Substance and Sexual Activity    Alcohol use: Yes     Comment: occasional    Drug use: No    Sexual activity: Not on file   Lifestyle    Physical activity     Days per week: Not on file     Minutes per session: Not on file    Stress: Not on file   Relationships    Social connections     Talks on phone: Not on file     Gets together: Not on file     Attends Jew service: Not on file     Active member of club or organization: Not on file     Attends meetings of clubs or organizations: Not on file     Relationship status: Not on file    Intimate partner violence     Fear of current or ex partner: Not on file     Emotionally abused: Not on file     Physically abused: Not on file     Forced sexual activity: Not on file   Other Topics Concern    Not on file   Social History Narrative    Not on file      Current Outpatient Medications   Medication Sig    olmesartan-hydroCHLOROthiazide (BENICAR HCT) 40-25 mg per tablet Take 1 Tab by mouth daily.  Restarted 7/15/20    Eliquis 5 mg tablet TAKE 1 TABLET BY MOUTH TWICE A DAY    gabapentin (NEURONTIN) 100 mg capsule Take 100 mg by mouth three (3) times daily.  doxazosin (CARDURA) 1 mg tablet TAKE 1 TABLET BY MOUTH EVERY DAY    metoprolol succinate (TOPROL-XL) 50 mg XL tablet TAKE 1 TAB BY MOUTH NIGHTLY.  polyethylene glycol (MIRALAX) 17 gram/dose powder Take 17 g by mouth as needed.  calcium carbonate (OS-KYLE) 500 mg calcium (1,250 mg) tablet Take  by mouth daily.  nitroglycerin (NITROSTAT) 0.4 mg SL tablet 1 Tab by SubLINGual route every five (5) minutes as needed (call 911 if not relieved by 3).  magnesium oxide 500 mg tab Take  by mouth.  atorvastatin (LIPITOR) 20 mg tablet Take 1 Tab by mouth daily. Refills per Dr. Ramin Gutierrez who is doing labs, please    cyanocobalamin 1,000 mcg tablet Take 1,000 mcg by mouth daily.  ZOLOFT 25 mg tablet Take 25 mg by mouth daily. 25- 50  prn    b complex vitamins tablet Take 1 Tab by mouth daily.  esomeprazole (NEXIUM) 20 mg capsule Take 20 mg by mouth daily.  omega-3 fatty acids-vitamin e (FISH OIL) 1,000 mg Cap Take 1 Cap by mouth daily. 788mg daily    aspirin 81 mg chewable tablet Take 1 Tab by mouth daily.  albuterol (PROVENTIL HFA, VENTOLIN HFA, PROAIR HFA) 90 mcg/actuation inhaler INHALE 2 PUFFS BY MOUTH EVERY 4 HOURS AS NEEDED     No current facility-administered medications for this visit. Review of Symptoms:  11 systems reviewed, negative other than as stated in the HPI    Physical ExamPhysical Exam:    Vitals:    07/15/20 1331   BP: 160/90   Pulse: 60   Resp: 16   SpO2: 96%   Weight: 186 lb 6.4 oz (84.6 kg)   Height: 5' 3\" (1.6 m)     Body mass index is 33.02 kg/m². General PE  Gen:  NAD  Mental Status - Alert. General Appearance - Not in acute distress. HEENT:  PERRL, no carotid bruits or JVD  Chest and Lung Exam   Inspection: Accessory muscles - No use of accessory muscles in breathing.    Auscultation:   Breath sounds: - Normal.   Cardiovascular   Inspection: Jugular vein - Bilateral - Inspection Normal.   Palpation/Percussion:   Apical Impulse: - Normal. Auscultation: Rhythm - Regular. Heart Sounds - S1 WNL and S2 WNL. No S3 or S4. Murmurs & Other Heart Sounds: Auscultation of the heart reveals - No Murmurs. Peripheral Vascular   Upper Extremity: Inspection - Bilateral - No Cyanotic nailbeds or Digital clubbing. Lower Extremity:   Palpation: Edema - Bilateral - No edema. Abdomen:   Soft, non-tender, bowel sounds are active.   Neuro: A&O times 3, CN and motor grossly WNL    Labs:   Lab Results   Component Value Date/Time    Cholesterol, total 161 10/16/2014 10:38 AM    Cholesterol, total 168 09/17/2013 12:00 AM    Cholesterol, total 172 01/17/2013 10:41 AM    Cholesterol, total 164 09/05/2012 10:30 AM    Cholesterol, total 131 05/20/2012 03:50 AM    HDL Cholesterol 85 10/16/2014 10:38 AM    HDL Cholesterol 82 09/17/2013 12:00 AM    HDL Cholesterol 84 01/17/2013 10:41 AM    HDL Cholesterol 65 09/05/2012 10:30 AM    HDL Cholesterol 48 05/20/2012 03:50 AM    LDL, calculated 65 10/16/2014 10:38 AM    LDL, calculated 70 09/17/2013 12:00 AM    LDL, calculated 66 01/17/2013 10:41 AM    LDL, calculated 66 09/05/2012 10:30 AM    LDL, calculated 46.8 05/20/2012 03:50 AM    Triglyceride 56 10/16/2014 10:38 AM    Triglyceride 79 09/17/2013 12:00 AM    Triglyceride 111 01/17/2013 10:41 AM    Triglyceride 167 (H) 09/05/2012 10:30 AM    Triglyceride 181 (H) 05/20/2012 03:50 AM    CHOL/HDL Ratio 2.7 05/20/2012 03:50 AM     Lab Results   Component Value Date/Time    CK 77 04/18/2013 02:41 AM     Lab Results   Component Value Date/Time    Sodium 138 04/14/2020 02:08 PM    Potassium 4.2 04/14/2020 02:08 PM    Chloride 92 (L) 04/14/2020 02:08 PM    CO2 27 04/14/2020 02:08 PM    Anion gap 9 09/12/2014 01:25 PM    Glucose 88 04/14/2020 02:08 PM    BUN 16 04/14/2020 02:08 PM    Creatinine 0.78 04/14/2020 02:08 PM    BUN/Creatinine ratio 21 04/14/2020 02:08 PM    GFR est AA 85 04/14/2020 02:08 PM    GFR est non-AA 74 04/14/2020 02:08 PM    Calcium 9.5 04/14/2020 02:08 PM Bilirubin, total 0.6 10/17/2017 03:12 PM    Alk. phosphatase 93 10/17/2017 03:12 PM    Protein, total 6.9 10/17/2017 03:12 PM    Albumin 4.3 10/17/2017 03:12 PM    Globulin 3.7 09/12/2014 01:25 PM    A-G Ratio 1.7 10/17/2017 03:12 PM    ALT (SGPT) 11 10/17/2017 03:12 PM       EKG:  SR LBBB     Assessment:     Assessment:      1. ASHD (arteriosclerotic heart disease)    2. Benign essential hypertension    3. Coronary artery disease involving native coronary artery of native heart without angina pectoris    4. Mixed hyperlipidemia    5. S/P PTCA (percutaneous transluminal coronary angioplasty)    6. PAF (paroxysmal atrial fibrillation) ([facility]        Orders Placed This Encounter    AMB POC EKG ROUTINE W/ 12 LEADS, INTER & REP     Order Specific Question:   Reason for Exam:     Answer:   routine    DISCONTD: olmesartan-hydroCHLOROthiazide (BENICAR HCT) 40-25 mg per tablet     Sig: Take 1 Tab by mouth daily.  DISCONTD: amLODIPine (NORVASC) 2.5 mg tablet     Sig: Take 1 Tab by mouth daily. Dispense:  30 Tab     Refill:  2    olmesartan-hydroCHLOROthiazide (BENICAR HCT) 40-25 mg per tablet     Sig: Take 1 Tab by mouth daily. Restarted 7/15/20     Dispense:  90 Tab     Refill:  4        Plan:     Patient presents for follow up, last seen by us in 2/2020 for BP check. At that time we further increased her Chlorthalidone to 25 mg daily. Repeat labs were stable. When she got home, she started taking olmesartan/hctz combo PLUS the chlorhalidone 25 mg and   Felt dizzy with SBP in the 80s-90s. She also states some chest discomfort with Chlothalidone. TODAY, bp is elevated. She has dc chlorthalidone on her own and went back to olmesartan/hctz.   NO further cp or sob.       Hypertension  Elevated---continue current medication therapy: Cardura 1 mg daily Olmesartan / Hctz 40mg/25mg daily Toprol XL 50 mg daily  She will monitor her bp and call us if persistently > 140/85  Home BP's (cuff has been calibrated times 2) 110-130- continue same. Kidney fxn, lytes stable 4/2020  Recall: She did worse with Lasix thanHCTZ, but due to uncontrolled blood pressure, change HCTZ to chlorthalidone  Now c/o chest discomfort / dizziness with Chlorthalidone- stopped     ASHD, remote PTCA CLEMENTE pox LAD 13'36  Negative NST in 12/19  Negative nuclear stress test in 12/14  Stable. Continue ASA, BB, statin        PAF  Normal EF grade 2 DD mild MR moderate TR per echo 12/19  Holter 10/17: Palpitations correspond to PAF with RVR. Maintaining sinus rhythm with metoprolol XL continue current therapy.    Tolerating Eliquis without bruising or bleeding.   Discussed risks and benefits of anticoagulation, signs and symptoms of bleeding, and to call if any concerns.      HLD   12/19 LDL at 68. On statin. Lipids and labs followed by PCP.       Carotid calcifications: Seen on report 2011  Mild stenosis left and right ICA per carotid duplex in 12/19        Hx Leg burning with exercise, venous varicosities, rule out claudication:  Previously ordered ABIs and venous reflux study---not done-defer at this time since she is not complaining of leg symptoms        Continue current care and f/u in 1 year.       Carolina Lyman MD

## 2021-01-11 ENCOUNTER — TRANSCRIBE ORDER (OUTPATIENT)
Dept: SCHEDULING | Age: 81
End: 2021-01-11

## 2021-01-11 DIAGNOSIS — M81.0 AGE RELATED OSTEOPOROSIS: ICD-10-CM

## 2021-01-11 DIAGNOSIS — M81.0 OSTEOPOROSIS, POST-MENOPAUSAL: Primary | ICD-10-CM

## 2021-01-11 DIAGNOSIS — Z13.820 SPECIAL SCREENING FOR OSTEOPOROSIS: Primary | ICD-10-CM

## 2021-01-14 ENCOUNTER — HOSPITAL ENCOUNTER (OUTPATIENT)
Dept: MAMMOGRAPHY | Age: 81
Discharge: HOME | End: 2021-01-14
Attending: FAMILY MEDICINE
Payer: MEDICARE

## 2021-01-14 DIAGNOSIS — M81.0 AGE RELATED OSTEOPOROSIS: ICD-10-CM

## 2021-01-14 PROCEDURE — 77080 DXA BONE DENSITY AXIAL: CPT

## 2021-07-26 NOTE — PROGRESS NOTES
[address] [facility] [place] [address] [facility]  [phone]     Subjective:      Edna Baer is a 80 y.o. female presents for follow up. Pmhx CAD, PAF, HTN, HLD and prev tobacco abuse. Last seen by us in 7/2020 : TODAY, bp is elevated. She has dc chlorthalidone on her own and went back to olmesartan/hctz. NO further cp or sob. Today, one episode of palpitation since last OV, didn't last.  States compliance with Eliquis and other medication regimen. No bleeding. Had labs done with PCP in 1/2021. Bp is controlled---home BP averages 130s/70s. Got covid vaccine [employer]). The patient denies chest pain/ shortness of breath, orthopnea, PND, LE edema, syncope, or presyncope. Recall OV 2/2020:  for BP check. At that time we further increased her Chlorthalidone to 25 mg daily. Repeat labs were stable. When she got home, she started taking olmesartan/hctz combo PLUS the chlorhalidone 25 mg and   Felt dizzy with SBP in the 80s-90s. She also states some chest discomfort with Chlothalidone.     Patient Active Problem List    Diagnosis Date Noted    PA[facility]oxysmal atrial fibrillation) ([facility] 10/12/2017    Benign essential hypertension 05/19/2012    Hyperlipidemia 05/19/2012    Depression 05/19/2012    Unstable angina pectoris ([facility] 05/19/2012    Left bundle-branch block 05/19/2012    CAD (coronary artery disease) 05/19/2012    S/P PTCA (percutaneous transluminal coronary angioplasty) 05/19/2012      Aravind Cardoza MD  Past Medical History:   Diagnosis Date    Atrial fibrillation [facility])     CAD (coronary artery disease)     Family history of skin cancer     Hypercholesteremia     Hypertension     Psychiatric disorder     depression    Skin cancer       Past Surgical History:   Procedure Laterality Date    AMB POC MOHS 1 STAGE H/N/HF/G      CARDIAC CATHETERIZATION  5/19/2012         COLONOSCOPY N/A 6/19/2019    COLONOSCOPY performed by Buddy Gordillo MD at [facility] ENDOSCOPY    DRUG ELUTING STENT SINGLE VESS  2012     Dr Tang Gaspar HX MOHS PROCEDURES  2017    BCC left superior helical rim by Dr. Glynn Palacios  2017    BCC L nasal sidewall by Dr. Remigio BAIN PTCA       Allergies   Allergen Reactions    Hygroton Vertigo     Dizziness, hypotension, SOB , chest pain & swelling    Norvasc [Amlodipine] Swelling      Family History   Problem Relation Age of Onset    Coronary Artery Disease Other         daughter stent age 28    Stroke Mother     Coronary Artery Disease Brother       Social History     Socioeconomic History    Marital status: SINGLE     Spouse name: Not on file    Number of children: Not on file    Years of education: Not on file    Highest education level: Not on file   Occupational History    Not on file   Tobacco Use    Smoking status: Former Smoker     Packs/day: 0.50     Years: 50.00     Pack years: 25.00     Quit date: 2012     Years since quittin.1    Smokeless tobacco: Never Used   Substance and Sexual Activity    Alcohol use: Yes     Comment: occasional    Drug use: No    Sexual activity: Not on file   Other Topics Concern    Not on file   Social History Narrative    Not on file     Social Determinants of Health     Financial Resource Strain:     Difficulty of Paying Living Expenses:    Food Insecurity:     Worried About Running Out of Food in the Last Year:     [address] in the Last Year:    Transportation Needs:     Lack of Transportation (Medical):      Lack of Transportation (Non-Medical):    Physical Activity:     Days of Exercise per Week:     Minutes of Exercise per Session:    Stress:     Feeling of Stress :    Social Connections:     Frequency of Communication with Friends and Family:     Frequency of Social Gatherings with Friends and Family:     Attends Baptism Services:     Active Member of Clubs or Organizations:     Attends Club or Organization Meetings:     Marital Status:    Intimate Partner Violence:     Fear of Current or Ex-Partner:     Emotionally Abused:     Physically Abused:     Sexually Abused:       Current Outpatient Medications   Medication Sig    pantoprazole (Protonix) 20 mg tablet Take 20 mg by mouth daily.  doxazosin (CARDURA) 1 mg tablet TAKE 1 TABLET BY MOUTH EVERY DAY    Eliquis 5 mg tablet TAKE 1 TABLET BY MOUTH TWICE A DAY    olmesartan-hydroCHLOROthiazide (BENICAR HCT) 40-25 mg per tablet TAKE 1 TAB BY MOUTH DAILY. RESTARTED 7/15/20    metoprolol succinate (TOPROL-XL) 50 mg XL tablet TAKE 1 TABLET BY MOUTH EVERY DAY AT NIGHT    nitroglycerin (NITROSTAT) 0.4 mg SL tablet 1 TAB BY SUBLINGUAL ROUTE EVERY FIVE (5) MINUTES AS NEEDED (CALL 911 IF NOT RELIEVED BY 3).  gabapentin (NEURONTIN) 100 mg capsule Take 100 mg by mouth three (3) times daily.  albuterol (PROVENTIL HFA, VENTOLIN HFA, PROAIR HFA) 90 mcg/actuation inhaler INHALE 2 PUFFS BY MOUTH EVERY 4 HOURS AS NEEDED    polyethylene glycol (MIRALAX) 17 gram/dose powder Take 17 g by mouth as needed.  calcium carbonate (OS-KYLE) 500 mg calcium (1,250 mg) tablet Take  by mouth daily.  atorvastatin (LIPITOR) 20 mg tablet Take 1 Tab by mouth daily. Refills per Dr. Mikey White who is doing labs, please    cyanocobalamin 1,000 mcg tablet Take 1,000 mcg by mouth daily.  ZOLOFT 25 mg tablet Take 50 mg by mouth daily.  b complex vitamins tablet Take 1 Tab by mouth daily.  omega-3 fatty acids-vitamin e (FISH OIL) 1,000 mg Cap Take 1 Cap by mouth daily. 788mg daily    aspirin 81 mg chewable tablet Take 1 Tab by mouth daily. No current facility-administered medications for this visit.          Review of Symptoms:  11 systems reviewed, negative other than as stated in the HPI    Physical ExamPhysical Exam:    Vitals:    08/03/21 1503   BP: (!) 146/80   Pulse: (!) 56   Resp: 18   SpO2: 99%   Weight: 191 lb 9.6 oz (86.9 kg)   Height: 5' 3\" (1.6 m)     Body mass index is 33.94 kg/m². General PE  Gen:  NAD  Mental Status - Alert. General Appearance - Not in acute distress. HEENT:  PERRL, no carotid bruits or JVD  Chest and Lung Exam   Inspection: Accessory muscles - No use of accessory muscles in breathing. Auscultation:   Breath sounds: - Normal.   Cardiovascular   Inspection: Jugular vein - Bilateral - Inspection Normal.   Palpation/Percussion:   Apical Impulse: - Normal.   Auscultation: Rhythm - Regular. Heart Sounds - S1 WNL and S2 WNL. No S3 or S4. Murmurs & Other Heart Sounds: Auscultation of the heart reveals - No Murmurs. Peripheral Vascular   Upper Extremity: Inspection - Bilateral - No Cyanotic nailbeds or Digital clubbing. Lower Extremity:   Palpation: Edema - Bilateral - No edema. Abdomen:   Soft, non-tender, bowel sounds are active.   Neuro: A&O times 3, CN and motor grossly WNL    Labs:   Lab Results   Component Value Date/Time    Cholesterol, total 161 10/16/2014 10:38 AM    Cholesterol, total 168 09/17/2013 12:00 AM    Cholesterol, total 172 01/17/2013 10:41 AM    Cholesterol, total 164 09/05/2012 10:30 AM    Cholesterol, total 131 05/20/2012 03:50 AM    HDL Cholesterol 85 10/16/2014 10:38 AM    HDL Cholesterol 82 09/17/2013 12:00 AM    HDL Cholesterol 84 01/17/2013 10:41 AM    HDL Cholesterol 65 09/05/2012 10:30 AM    HDL Cholesterol 48 05/20/2012 03:50 AM    LDL, calculated 65 10/16/2014 10:38 AM    LDL, calculated 70 09/17/2013 12:00 AM    LDL, calculated 66 01/17/2013 10:41 AM    LDL, calculated 66 09/05/2012 10:30 AM    LDL, calculated 46.8 05/20/2012 03:50 AM    Triglyceride 56 10/16/2014 10:38 AM    Triglyceride 79 09/17/2013 12:00 AM    Triglyceride 111 01/17/2013 10:41 AM    Triglyceride 167 (H) 09/05/2012 10:30 AM    Triglyceride 181 (H) 05/20/2012 03:50 AM    CHOL/HDL Ratio 2.7 05/20/2012 03:50 AM     Lab Results   Component Value Date/Time    CK 77 04/18/2013 02:41 AM     Lab Results   Component Value Date/Time Sodium 138 04/14/2020 02:08 PM    Potassium 4.2 04/14/2020 02:08 PM    Chloride 92 (L) 04/14/2020 02:08 PM    CO2 27 04/14/2020 02:08 PM    Anion gap 9 09/12/2014 01:25 PM    Glucose 88 04/14/2020 02:08 PM    BUN 16 04/14/2020 02:08 PM    Creatinine 0.78 04/14/2020 02:08 PM    BUN/Creatinine ratio 21 04/14/2020 02:08 PM    GFR est AA 85 04/14/2020 02:08 PM    GFR est non-AA 74 04/14/2020 02:08 PM    Calcium 9.5 04/14/2020 02:08 PM    Bilirubin, total 0.6 10/17/2017 03:12 PM    Alk. phosphatase 93 10/17/2017 03:12 PM    Protein, total 6.9 10/17/2017 03:12 PM    Albumin 4.3 10/17/2017 03:12 PM    Globulin 3.7 09/12/2014 01:25 PM    A-G Ratio 1.7 10/17/2017 03:12 PM    ALT (SGPT) 11 10/17/2017 03:12 PM       EKG:  SB LBBB     Assessment:     Assessment:      1. Coronary artery disease involving native coronary artery of native heart without angina pectoris    2. S/P PTCA (percutaneous transluminal coronary angioplasty)    3. Left bundle-branch block    4. Benign essential hypertension    5. Mixed hyperlipidemia    6. PAF (paroxysmal atrial fibrillation) ([facility]        Orders Placed This Encounter    AMB POC EKG ROUTINE W/ 12 LEADS, INTER & REP     Order Specific Question:   Reason for Exam:     Answer:   routine    pantoprazole (Protonix) 20 mg tablet     Sig: Take 20 mg by mouth daily. Plan:       ASHD, remote PTCA CLEMENTE pox LAD 05'12  Negative NST in 12/19  Negative nuclear stress test in 12/14  Stable. Continue ASA, BB, statin        PAF  Normal EF grade 2 DD mild MR moderate TR per echo 12/19  Holter 10/17: Palpitations correspond to PAF with RVR.   Maintaining sinus rhythm with metoprolol XL continue current therapy.    Tolerating Eliquis without bruising or bleeding.   Discussed risks and benefits of anticoagulation, signs and symptoms of bleeding, and to call if any concerns.     Hypertension  Controlled with current therapy:  Home BP averages 130s/70s  Olmesartan / Hctz 40mg/25mg daily  (taking am); Cardura 1 mg daily (taking mid day); Toprol XL 50 mg daily (taking pm)  If BP < 130/80, she will hold Cardura---d/t episode of lightheadedness with /68   Kidney fxn, lytes stable 1/2021---will request actual lab result from Dr Aroldo Castellanos   Recall: She did worse with Lasix thanHCTZ, but due to uncontrolled blood pressure, change HCTZ to chlorthalidone  Now c/o chest discomfort / dizziness with Chlorthalidone- stopped        HLD   1/2021 LDL 52 on atorva 20 mg daily  12/19 LDL at 68.  On atorva 20 mg daily       Carotid calcifications: Seen on report 2011  Mild stenosis left and right ICA per carotid duplex in 12/19        Hx Leg burning with exercise, venous varicosities, rule out claudication:  Previously ordered ABIs and venous reflux study---not done-defer at this time since she is not complaining of leg symptoms        Continue current care and f/u in 6 mos    Lavona Clock, NP

## 2021-08-03 ENCOUNTER — TELEPHONE (OUTPATIENT)
Dept: CARDIOLOGY CLINIC | Age: 81
End: 2021-08-03

## 2021-08-03 ENCOUNTER — OFFICE VISIT (OUTPATIENT)
Dept: CARDIOLOGY CLINIC | Age: 81
End: 2021-08-03
Payer: MEDICARE

## 2021-08-03 VITALS
WEIGHT: 191.6 LBS | BODY MASS INDEX: 33.95 KG/M2 | SYSTOLIC BLOOD PRESSURE: 146 MMHG | OXYGEN SATURATION: 99 % | RESPIRATION RATE: 18 BRPM | HEIGHT: 63 IN | DIASTOLIC BLOOD PRESSURE: 80 MMHG | HEART RATE: 56 BPM

## 2021-08-03 DIAGNOSIS — I44.7 LEFT BUNDLE-BRANCH BLOCK: ICD-10-CM

## 2021-08-03 DIAGNOSIS — Z98.61 S/P PTCA (PERCUTANEOUS TRANSLUMINAL CORONARY ANGIOPLASTY): ICD-10-CM

## 2021-08-03 DIAGNOSIS — I10 BENIGN ESSENTIAL HYPERTENSION: ICD-10-CM

## 2021-08-03 DIAGNOSIS — E78.2 MIXED HYPERLIPIDEMIA: ICD-10-CM

## 2021-08-03 DIAGNOSIS — I25.10 CORONARY ARTERY DISEASE INVOLVING NATIVE CORONARY ARTERY OF NATIVE HEART WITHOUT ANGINA PECTORIS: Primary | ICD-10-CM

## 2021-08-03 DIAGNOSIS — I48.0 PAF (PAROXYSMAL ATRIAL FIBRILLATION) (HCC): ICD-10-CM

## 2021-08-03 PROCEDURE — G8427 DOCREV CUR MEDS BY ELIG CLIN: HCPCS | Performed by: NURSE PRACTITIONER

## 2021-08-03 PROCEDURE — 1101F PT FALLS ASSESS-DOCD LE1/YR: CPT | Performed by: NURSE PRACTITIONER

## 2021-08-03 PROCEDURE — 93000 ELECTROCARDIOGRAM COMPLETE: CPT | Performed by: NURSE PRACTITIONER

## 2021-08-03 PROCEDURE — 99214 OFFICE O/P EST MOD 30 MIN: CPT | Performed by: NURSE PRACTITIONER

## 2021-08-03 PROCEDURE — G8754 DIAS BP LESS 90: HCPCS | Performed by: NURSE PRACTITIONER

## 2021-08-03 PROCEDURE — G8417 CALC BMI ABV UP PARAM F/U: HCPCS | Performed by: NURSE PRACTITIONER

## 2021-08-03 PROCEDURE — G8753 SYS BP > OR = 140: HCPCS | Performed by: NURSE PRACTITIONER

## 2021-08-03 PROCEDURE — 1090F PRES/ABSN URINE INCON ASSESS: CPT | Performed by: NURSE PRACTITIONER

## 2021-08-03 PROCEDURE — G8399 PT W/DXA RESULTS DOCUMENT: HCPCS | Performed by: NURSE PRACTITIONER

## 2021-08-03 PROCEDURE — G8536 NO DOC ELDER MAL SCRN: HCPCS | Performed by: NURSE PRACTITIONER

## 2021-08-03 PROCEDURE — G9717 DOC PT DX DEP/BP F/U NT REQ: HCPCS | Performed by: NURSE PRACTITIONER

## 2021-08-03 RX ORDER — PANTOPRAZOLE SODIUM 20 MG/1
20 TABLET, DELAYED RELEASE ORAL DAILY
COMMUNITY

## 2021-08-03 NOTE — TELEPHONE ENCOUNTER
----- Message from Cyndi Russell NP sent at 8/3/2021  3:39 PM EDT -----  Once you get the labs --- we need to call her to let her know actual numbers.

## 2021-08-03 NOTE — PROGRESS NOTES
1. Have you been to the ER, urgent care clinic since your last visit? Hospitalized since your last visit? No    2. Have you seen or consulted any other health care providers outside of the [address] [place] [address] since your last visit? Include any pap smears or colon screening. No    Chief Complaint   Patient presents with    Coronary Artery Disease     Yearly appt. C/O low BP.

## 2021-08-04 ENCOUNTER — TELEPHONE (OUTPATIENT)
Dept: CARDIOLOGY CLINIC | Age: 81
End: 2021-08-04

## 2021-08-25 ENCOUNTER — TRANSCRIBE ORDER (OUTPATIENT)
Dept: SCHEDULING | Age: 81
End: 2021-08-25

## 2021-08-25 DIAGNOSIS — M25.562 LEFT KNEE PAIN: ICD-10-CM
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
## 2021-09-04 ENCOUNTER — HOSPITAL ENCOUNTER (OUTPATIENT)
Dept: MRI IMAGING | Age: 81
Discharge: HOME | End: 2021-09-04
Attending: ORTHOPAEDIC SURGERY
Payer: MEDICARE

## 2021-09-04 DIAGNOSIS — M25.562 LEFT KNEE PAIN: ICD-10-CM
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
## 2021-09-04 PROCEDURE — 73721 MRI JNT OF LWR EXTRE W/O DYE: CPT

## 2021-10-15 RX ORDER — OLMESARTAN MEDOXOMIL AND HYDROCHLOROTHIAZIDE 40/25 40; 25 MG/1; MG/1
1 TABLET ORAL DAILY
Qty: 90 TABLET | Refills: 0 | Status: SHIPPED | OUTPATIENT
Start: 2021-10-15 | End: 2022-01-30

## 2021-10-15 RX ORDER — DOXAZOSIN 1 MG/1
TABLET ORAL
Qty: 90 TABLET | Refills: 0 | Status: SHIPPED | OUTPATIENT
Start: 2021-10-15 | End: 2022-03-09 | Stop reason: SDUPTHER

## 2021-11-22 RX ORDER — APIXABAN 5 MG/1
TABLET, FILM COATED ORAL
Qty: 180 TABLET | Refills: 0 | Status: SHIPPED | OUTPATIENT
Start: 2021-11-22 | End: 2022-01-30

## 2022-01-30 RX ORDER — OLMESARTAN MEDOXOMIL AND HYDROCHLOROTHIAZIDE 40/25 40; 25 MG/1; MG/1
1 TABLET ORAL DAILY
Qty: 90 TABLET | Refills: 0 | Status: SHIPPED | OUTPATIENT
Start: 2022-01-30 | End: 2022-07-16

## 2022-01-30 RX ORDER — APIXABAN 5 MG/1
TABLET, FILM COATED ORAL
Qty: 180 TABLET | Refills: 0 | Status: SHIPPED | OUTPATIENT
Start: 2022-01-30 | End: 2022-07-22

## 2022-02-09 ENCOUNTER — TRANSCRIBE ORDER (OUTPATIENT)
Dept: REGISTRATION | Age: 82
End: 2022-02-09

## 2022-02-09 ENCOUNTER — HOSPITAL ENCOUNTER (OUTPATIENT)
Dept: GENERAL RADIOLOGY | Age: 82
Discharge: HOME | End: 2022-02-09
Payer: MEDICARE

## 2022-02-09 DIAGNOSIS — R05.9 COUGH: Primary | ICD-10-CM

## 2022-02-09 DIAGNOSIS — R05.9 COUGH: ICD-10-CM

## 2022-02-09 PROCEDURE — 71046 X-RAY EXAM CHEST 2 VIEWS: CPT

## 2022-02-16 NOTE — TELEPHONE ENCOUNTER
Katiana     Can you refill pts Nitroglycerin was last seen by you 8/3/21. Next appt with maria luisa 3/21/22.

## 2022-02-17 RX ORDER — NITROGLYCERIN 0.4 MG/1
0.4 TABLET SUBLINGUAL
Qty: 25 TABLET | Refills: 1 | Status: SHIPPED | OUTPATIENT
Start: 2022-02-17

## 2022-03-09 ENCOUNTER — OFFICE VISIT (OUTPATIENT)
Dept: CARDIOLOGY CLINIC | Age: 82
End: 2022-03-09
Payer: MEDICARE

## 2022-03-09 ENCOUNTER — TELEPHONE (OUTPATIENT)
Dept: CARDIOLOGY CLINIC | Age: 82
End: 2022-03-09

## 2022-03-09 VITALS
HEIGHT: 63 IN | BODY MASS INDEX: 33.51 KG/M2 | WEIGHT: 189.13 LBS | HEART RATE: 57 BPM | SYSTOLIC BLOOD PRESSURE: 160 MMHG | DIASTOLIC BLOOD PRESSURE: 92 MMHG | OXYGEN SATURATION: 98 % | RESPIRATION RATE: 18 BRPM

## 2022-03-09 DIAGNOSIS — I48.0 PAF (PAROXYSMAL ATRIAL FIBRILLATION) (HCC): ICD-10-CM

## 2022-03-09 DIAGNOSIS — I10 BENIGN ESSENTIAL HYPERTENSION: ICD-10-CM

## 2022-03-09 DIAGNOSIS — E78.2 MIXED HYPERLIPIDEMIA: ICD-10-CM

## 2022-03-09 DIAGNOSIS — I25.10 CORONARY ARTERY DISEASE INVOLVING NATIVE CORONARY ARTERY OF NATIVE HEART WITHOUT ANGINA PECTORIS: Primary | ICD-10-CM

## 2022-03-09 PROCEDURE — G8536 NO DOC ELDER MAL SCRN: HCPCS | Performed by: NURSE PRACTITIONER

## 2022-03-09 PROCEDURE — G8754 DIAS BP LESS 90: HCPCS | Performed by: NURSE PRACTITIONER

## 2022-03-09 PROCEDURE — G9717 DOC PT DX DEP/BP F/U NT REQ: HCPCS | Performed by: NURSE PRACTITIONER

## 2022-03-09 PROCEDURE — G8417 CALC BMI ABV UP PARAM F/U: HCPCS | Performed by: NURSE PRACTITIONER

## 2022-03-09 PROCEDURE — G8753 SYS BP > OR = 140: HCPCS | Performed by: NURSE PRACTITIONER

## 2022-03-09 PROCEDURE — 1090F PRES/ABSN URINE INCON ASSESS: CPT | Performed by: NURSE PRACTITIONER

## 2022-03-09 PROCEDURE — 99214 OFFICE O/P EST MOD 30 MIN: CPT | Performed by: NURSE PRACTITIONER

## 2022-03-09 PROCEDURE — G8399 PT W/DXA RESULTS DOCUMENT: HCPCS | Performed by: NURSE PRACTITIONER

## 2022-03-09 PROCEDURE — G8427 DOCREV CUR MEDS BY ELIG CLIN: HCPCS | Performed by: NURSE PRACTITIONER

## 2022-03-09 PROCEDURE — 93000 ELECTROCARDIOGRAM COMPLETE: CPT | Performed by: NURSE PRACTITIONER

## 2022-03-09 PROCEDURE — 1101F PT FALLS ASSESS-DOCD LE1/YR: CPT | Performed by: NURSE PRACTITIONER

## 2022-03-09 RX ORDER — DOXAZOSIN 1 MG/1
1 TABLET ORAL DAILY
Qty: 90 TABLET | Refills: 3 | Status: SHIPPED | OUTPATIENT
Start: 2022-03-09

## 2022-03-09 NOTE — PROGRESS NOTES
Rubi Fasutin, [facility]    Subjective/HPI:     Jef Tran is a 80 y.o. female is here for routine f/u. She has a PMHx of CAD, PAF, HTN, HLD. Doing well. Has some knee pains due to a torn ligament of the left leg. Otherwise denies complaints of chest pains, dizziness, orthopnea, PND or edema. Denies palpitation symptoms. Feels her A fib \"flip\" every now and then. Monitoring BP at home. Averages around 130s/70s, up to 140s, but never higher than this. Current Outpatient Medications on File Prior to Visit   Medication Sig Dispense Refill    nitroglycerin (NITROSTAT) 0.4 mg SL tablet 1 Tablet by SubLINGual route every five (5) minutes as needed (call 911 if not relieved by 3). 25 Tablet 1    Eliquis 5 mg tablet TAKE 1 TABLET BY MOUTH TWICE A  Tablet 0    olmesartan-hydroCHLOROthiazide (BENICAR HCT) 40-25 mg per tablet TAKE 1 TAB BY MOUTH DAILY. RESTARTED 7/15/20 90 Tablet 0    doxazosin (CARDURA) 1 mg tablet TAKE 1 TABLET BY MOUTH EVERY DAY 90 Tablet 0    pantoprazole (Protonix) 20 mg tablet Take 20 mg by mouth daily.  metoprolol succinate (TOPROL-XL) 50 mg XL tablet TAKE 1 TABLET BY MOUTH EVERY DAY AT NIGHT 90 Tablet 3    gabapentin (NEURONTIN) 100 mg capsule Take 100 mg by mouth three (3) times daily.  albuterol (PROVENTIL HFA, VENTOLIN HFA, PROAIR HFA) 90 mcg/actuation inhaler INHALE 2 PUFFS BY MOUTH EVERY 4 HOURS AS NEEDED  0    polyethylene glycol (MIRALAX) 17 gram/dose powder Take 17 g by mouth as needed.  calcium carbonate (OS-KYLE) 500 mg calcium (1,250 mg) tablet Take  by mouth daily.  atorvastatin (LIPITOR) 20 mg tablet Take 1 Tab by mouth daily. Refills per Dr. Hai South who is doing labs, please 60 Tab 0    cyanocobalamin 1,000 mcg tablet Take 1,000 mcg by mouth daily.  ZOLOFT 25 mg tablet Take 50 mg by mouth daily. 2    b complex vitamins tablet Take 1 Tab by mouth daily.       omega-3 fatty acids-vitamin e (FISH OIL) 1,000 mg Cap Take 1 Cap by mouth daily. 788mg daily      aspirin 81 mg chewable tablet Take 1 Tab by mouth daily. 30 Tab 99     No current facility-administered medications on file prior to visit. Review of Symptoms:    Review of Systems   Constitutional: Negative for chills, fever and weight loss. HENT: Negative for nosebleeds. Eyes: Negative for blurred vision and double vision. Respiratory: Negative for cough, shortness of breath and wheezing. Cardiovascular: Negative for chest pain, palpitations, orthopnea, leg swelling and PND. Skin: Negative for rash. Neurological: Negative for dizziness and loss of consciousness. Physical Exam:      General: Well developed, in no acute distress, cooperative and alert  Heart:  reg rate and rhythm; normal S1/S2; no murmurs, no gallops or rubs. Respiratory: Clear bilaterally x 4, no wheezing or rales  Extremities:  Normal cap refill, no cyanosis, atraumatic. No edema. Vascular: 2+ pulses symmetric in all extremities    Vitals:    03/09/22 1431   BP: (!) 148/82   BP 1 Location: Left arm   BP Patient Position: Sitting   BP Cuff Size: Large adult   Pulse: (!) 57   Resp: 18   Height: 5' 3\" (1.6 m)   Weight: 189 lb 2 oz (85.8 kg)   SpO2: 98%       ECG done today shows sinus rhythm; LBBB     Assessment:       ICD-10-CM ICD-9-CM    1. Coronary artery disease involving native coronary artery of native heart without angina pectoris  I25.10 414.01 AMB POC EKG ROUTINE W/ 12 LEADS, INTER & REP   2. PAF (paroxysmal atrial fibrillation) ([facility])  I48.0 427.31    3. Benign essential hypertension  I10 401.1    4. Mixed hyperlipidemia  E78.2 272.2         Plan:     1. Coronary artery disease involving native coronary artery of native heart without angina pectoris  Hx of CLEMENTE to LAD in 5/2012  Lexiscan stress test done 12/2019 without evidence of ischemia  Without anginal or anginal equivalent symptoms  Continue ASA, BB, statin therapy    2.  PAF (paroxysmal atrial fibrillation) [facility])  Maintaining sinus rhythm today  Echo done 12/2019 with preserved LVEF, grade 2 DD, mild MR and mod TR  Continue BB therapy  On Eliquis for stroke prevention. 3. Benign essential hypertension  Currently on cardura 1 mg daily, toprol 50 mg daily, olmesartan/hct 40/25 mg daily  Did not previously tolerate chlorthalidone, or lasix. BP at home controlled, around 660-503O systolic    4.  Mixed hyperlipidemia  Continue statin therapy and low fat, low cholesterol diet  Lipids managed by PCP -- will get records for review    F/u with Dr. Garrett Sanabria in 6 months    Alysa Barrientos NP

## 2022-03-09 NOTE — TELEPHONE ENCOUNTER
Isi Ferguson, OPAL [place], ANALI  Please obtain most recent lipid panel from PCP and place on my desk for review.      Thanks,   [employer]

## 2022-03-09 NOTE — PROGRESS NOTES
Chief Complaint   Patient presents with    Coronary Artery Disease     6 months follow up- Denies cardiac sx. 1. Have you been to the ER, urgent care clinic since your last visit? Hospitalized since your last visit? No    2. Have you seen or consulted any other health care providers outside of the [address] [place] [address] since your last visit? Yes, Dr. Ki Garcia PCP.

## 2022-03-19 PROBLEM — I48.0 PAF (PAROXYSMAL ATRIAL FIBRILLATION) (HCC): Status: ACTIVE | Noted: 2017-10-12

## 2022-07-16 RX ORDER — OLMESARTAN MEDOXOMIL AND HYDROCHLOROTHIAZIDE 40/25 40; 25 MG/1; MG/1
1 TABLET ORAL DAILY
Qty: 90 TABLET | Refills: 0 | Status: SHIPPED | OUTPATIENT
Start: 2022-07-16 | End: 2022-10-04

## 2022-09-12 ENCOUNTER — TELEPHONE (OUTPATIENT)
Dept: CARDIOLOGY CLINIC | Age: 82
End: 2022-09-12

## 2022-09-12 ENCOUNTER — OFFICE VISIT (OUTPATIENT)
Dept: CARDIOLOGY CLINIC | Age: 82
End: 2022-09-12
Payer: MEDICARE

## 2022-09-12 VITALS
RESPIRATION RATE: 16 BRPM | BODY MASS INDEX: 33.31 KG/M2 | OXYGEN SATURATION: 95 % | SYSTOLIC BLOOD PRESSURE: 144 MMHG | DIASTOLIC BLOOD PRESSURE: 90 MMHG | HEIGHT: 63 IN | WEIGHT: 188 LBS | HEART RATE: 57 BPM

## 2022-09-12 DIAGNOSIS — I25.10 CORONARY ARTERY DISEASE INVOLVING NATIVE CORONARY ARTERY OF NATIVE HEART WITHOUT ANGINA PECTORIS: ICD-10-CM

## 2022-09-12 DIAGNOSIS — Z98.61 S/P PTCA (PERCUTANEOUS TRANSLUMINAL CORONARY ANGIOPLASTY): ICD-10-CM

## 2022-09-12 DIAGNOSIS — I10 BENIGN ESSENTIAL HYPERTENSION: ICD-10-CM

## 2022-09-12 DIAGNOSIS — I44.7 LEFT BUNDLE-BRANCH BLOCK: ICD-10-CM

## 2022-09-12 DIAGNOSIS — E78.2 MIXED HYPERLIPIDEMIA: ICD-10-CM

## 2022-09-12 DIAGNOSIS — I48.0 PAF (PAROXYSMAL ATRIAL FIBRILLATION) (HCC): Primary | ICD-10-CM

## 2022-09-12 PROCEDURE — G8753 SYS BP > OR = 140: HCPCS | Performed by: INTERNAL MEDICINE

## 2022-09-12 PROCEDURE — 1101F PT FALLS ASSESS-DOCD LE1/YR: CPT | Performed by: INTERNAL MEDICINE

## 2022-09-12 PROCEDURE — G8755 DIAS BP > OR = 90: HCPCS | Performed by: INTERNAL MEDICINE

## 2022-09-12 PROCEDURE — 1123F ACP DISCUSS/DSCN MKR DOCD: CPT | Performed by: INTERNAL MEDICINE

## 2022-09-12 PROCEDURE — G8417 CALC BMI ABV UP PARAM F/U: HCPCS | Performed by: INTERNAL MEDICINE

## 2022-09-12 PROCEDURE — 99214 OFFICE O/P EST MOD 30 MIN: CPT | Performed by: INTERNAL MEDICINE

## 2022-09-12 PROCEDURE — 1090F PRES/ABSN URINE INCON ASSESS: CPT | Performed by: INTERNAL MEDICINE

## 2022-09-12 PROCEDURE — G8427 DOCREV CUR MEDS BY ELIG CLIN: HCPCS | Performed by: INTERNAL MEDICINE

## 2022-09-12 PROCEDURE — G9717 DOC PT DX DEP/BP F/U NT REQ: HCPCS | Performed by: INTERNAL MEDICINE

## 2022-09-12 PROCEDURE — 93000 ELECTROCARDIOGRAM COMPLETE: CPT | Performed by: INTERNAL MEDICINE

## 2022-09-12 PROCEDURE — G8399 PT W/DXA RESULTS DOCUMENT: HCPCS | Performed by: INTERNAL MEDICINE

## 2022-09-12 PROCEDURE — G8536 NO DOC ELDER MAL SCRN: HCPCS | Performed by: INTERNAL MEDICINE

## 2022-09-12 RX ORDER — DOXYCYCLINE 100 MG/1
100 CAPSULE ORAL 2 TIMES DAILY
COMMUNITY
Start: 2022-08-15

## 2022-09-12 NOTE — PATIENT INSTRUCTIONS
Your physician has ordered a one month  loop monitor that will be arriving at your address within 5-7 days. Please come an see us in a year for your follow up.

## 2022-09-12 NOTE — LETTER
9/12/2022    Patient: Bashir Sabillon   YOB: 1940   Date of Visit: 9/12/2022     Bhaskar Louis MD  [address] [address]  [place]    Dear Bhaskar Louis MD,      Thank you for referring Ms. Rashad Singh to [facility] for evaluation. My notes for this consultation are attached. If you have questions, please do not hesitate to call me. I look forward to following your patient along with you.       Sincerely,    Gris Manzano MD

## 2022-09-12 NOTE — TELEPHONE ENCOUNTER
Enrolled with [employer] - Ordered and being shipped to patient's home address on file. ETA within 5-7 business days. Message  Received: Today  Geryl Share    Per Dr. Samia Ramos above patient needs a 1 month Loop Monitor for near syncope and LBBB   Thanks/

## 2022-09-12 NOTE — PROGRESS NOTES
Jeanette , [place], [address] [place] [address]  [phone]     Subjective:      Jeannie Ernandez is a [de-identified] y.o. female is here for routine f/u. The patient was last seen by us March 2022. Since last visit, she had a couple episodes where she felt dizzy, sweaty all over, and felt like she was about to pass out. She sat down and they resolved. Both episodes seemed to have occurred after bending over and then standing back up. She has been monitoring blood pressures at home and they typically are running about 140s to 150s. Today, the patient denies chest pain/ shortness of breath, orthopnea, PND, LE edema, change in occasional brief palpitations.        Patient Active Problem List    Diagnosis Date Noted    PAF ([facility]oxysmal [facility] fibrillation) [facility] 10/12/2017    Benign essential hypertension 05/19/2012    Hyperlipidemia 05/19/2012    Depression 05/19/2012    Left bundle-branch block 05/19/2012    CAD (coronary artery disease) 05/19/2012    S/P PTCA (percutaneous transluminal coronary angioplasty) 05/19/2012      Rajni Capps MD  Past Medical History:   Diagnosis Date    Atrial fibrillation [facility])     CAD (coronary artery disease)     Family history of skin cancer     Hypercholesteremia     Hypertension     Psychiatric disorder     depression    Skin cancer       Past Surgical History:   Procedure Laterality Date    AMB POC MOHS 1 STAGE H/N/HF/G      CARDIAC CATHETERIZATION  5/19/2012         COLONOSCOPY N/A 6/19/2019    COLONOSCOPY performed by Rosemary Prather MD at [facility] ENDOSCOPY    DRUG ELUTING STENT SINGLE VESS  5/19/2012     Dr Gisela Woodsida Visconde Do [facility] [address]      HX MOHS PROCEDURES  04/13/2017    [facility] left superior helical rim by Dr. Myriam Zarate PROCEDURES  12/26/2017    BCC L nasal sidewall by Dr. Charla Sears PTCA       Allergies   Allergen Reactions    Hygroton Vertigo     Dizziness, hypotension, SOB , chest pain & swelling    Norvasc [Amlodipine] Swelling      Family History Problem Relation Age of Onset    Coronary Art Dis Other         daughter stent age 28    Stroke Mother     Coronary Art Dis Brother     Heart Attack Brother     Pacemaker Brother       Social History     Socioeconomic History    Marital status: SINGLE     Spouse name: Not on file    Number of children: Not on file    Years of education: Not on file    Highest education level: Not on file   Occupational History    Not on file   Tobacco Use    Smoking status: Former     Packs/day: 0.50     Years: 50.00     Pack years: 25.00     Types: Cigarettes     Quit date: 5/25/2012     Years since quitting: 10.3    Smokeless tobacco: Never   Vaping Use    Vaping Use: Never used   Substance and Sexual Activity    Alcohol use: Not Currently     Comment: occasional    Drug use: No    Sexual activity: Not on file   Other Topics Concern    Not on file   Social History Narrative    Not on file     Social Determinants of Health     Financial Resource Strain: Not on file   Food Insecurity: Not on file   Transportation Needs: Not on file   Physical Activity: Not on file   Stress: Not on file   Social Connections: Not on file   Intimate Partner Violence: Not on file   Housing Stability: Not on file      Current Outpatient Medications   Medication Sig    metoprolol succinate (TOPROL-XL) 50 mg XL tablet TAKE 1 TABLET BY MOUTH EVERY DAY AT NIGHT    Eliquis 5 mg tablet TAKE 1 TABLET BY MOUTH TWICE A DAY    olmesartan-hydroCHLOROthiazide (BENICAR HCT) 40-25 mg per tablet TAKE 1 TAB BY MOUTH DAILY. RESTARTED 7/15/20    doxazosin (CARDURA) 1 mg tablet Take 1 Tablet by mouth daily. gabapentin (NEURONTIN) 100 mg capsule Take 100 mg by mouth three (3) times daily. albuterol (PROVENTIL HFA, VENTOLIN HFA, PROAIR HFA) 90 mcg/actuation inhaler INHALE 2 PUFFS BY MOUTH EVERY 4 HOURS AS NEEDED    polyethylene glycol (MIRALAX) 17 gram/dose powder Take 17 g by mouth as needed.     calcium carbonate (OS-KYLE) 500 mg calcium (1,250 mg) tablet Take  by mouth daily. atorvastatin (LIPITOR) 20 mg tablet Take 1 Tab by mouth daily. Refills per Dr. Yuri Pedroza who is doing labs, please    cyanocobalamin 1,000 mcg tablet Take 1,000 mcg by mouth daily. ZOLOFT 25 mg tablet Take 50 mg by mouth daily. b complex vitamins tablet Take 1 Tab by mouth daily. omega-3 fatty acids-vitamin e 1,000 mg cap Take 1 Cap by mouth daily. 788mg daily    aspirin 81 mg chewable tablet Take 1 Tab by mouth daily. doxycycline (MONODOX) 100 mg capsule Take 100 mg by mouth two (2) times a day. (Patient not taking: Reported on 9/12/2022)    nitroglycerin (NITROSTAT) 0.4 mg SL tablet 1 Tablet by SubLINGual route every five (5) minutes as needed (call 911 if not relieved by 3). pantoprazole (PROTONIX) 20 mg tablet Take 20 mg by mouth daily. (Patient not taking: Reported on 9/12/2022)     No current facility-administered medications for this visit. Review of Symptoms:  11 systems reviewed, negative other than as stated in the HPI    Physical ExamPhysical Exam:    Vitals:    09/12/22 1054   BP: (!) 144/90   Pulse: (!) 57   Resp: 16   SpO2: 95%   Weight: 188 lb (85.3 kg)   Height: 5' 3\" (1.6 m)     Body mass index is 33.3 kg/m². General PE  Gen:  NAD  Mental Status - Alert. General Appearance - Not in acute distress. HEENT:  PERRL, no carotid bruits or JVD  Chest and Lung Exam   Inspection: Accessory muscles - No use of accessory muscles in breathing. Auscultation:   Breath sounds: - Normal.   Cardiovascular   Inspection: Jugular vein - Bilateral - Inspection Normal.   Palpation/Percussion:   Apical Impulse: - Normal.   Auscultation: Rhythm - Regular. Heart Sounds - S1 WNL and S2 WNL. No S3 or S4. Murmurs & Other Heart Sounds: Auscultation of the heart reveals - No Murmurs. Peripheral Vascular   Upper Extremity: Inspection - Bilateral - No Cyanotic nailbeds or Digital clubbing. Lower Extremity:   Palpation: Edema - Bilateral - No edema.   Abdomen:   Soft, non-tender, bowel sounds are active. Neuro: A&O times 3, CN and motor grossly WNL    Labs:   Lab Results   Component Value Date/Time    Cholesterol, total 161 10/16/2014 10:38 AM    Cholesterol, total 168 09/17/2013 12:00 AM    Cholesterol, total 172 01/17/2013 10:41 AM    Cholesterol, total 164 09/05/2012 10:30 AM    Cholesterol, total 131 05/20/2012 03:50 AM    HDL Cholesterol 85 10/16/2014 10:38 AM    HDL Cholesterol 82 09/17/2013 12:00 AM    HDL Cholesterol 84 01/17/2013 10:41 AM    HDL Cholesterol 65 09/05/2012 10:30 AM    HDL Cholesterol 48 05/20/2012 03:50 AM    LDL, calculated 65 10/16/2014 10:38 AM    LDL, calculated 70 09/17/2013 12:00 AM    LDL, calculated 66 01/17/2013 10:41 AM    LDL, calculated 66 09/05/2012 10:30 AM    LDL, calculated 46.8 05/20/2012 03:50 AM    Triglyceride 56 10/16/2014 10:38 AM    Triglyceride 79 09/17/2013 12:00 AM    Triglyceride 111 01/17/2013 10:41 AM    Triglyceride 167 (H) 09/05/2012 10:30 AM    Triglyceride 181 (H) 05/20/2012 03:50 AM    CHOL/HDL Ratio 2.7 05/20/2012 03:50 AM     Lab Results   Component Value Date/Time    CK 77 04/18/2013 02:41 AM     Lab Results   Component Value Date/Time    Sodium 138 04/14/2020 02:08 PM    Potassium 4.2 04/14/2020 02:08 PM    Chloride 92 (L) 04/14/2020 02:08 PM    CO2 27 04/14/2020 02:08 PM    Anion gap 9 09/12/2014 01:25 PM    Glucose 88 04/14/2020 02:08 PM    BUN 16 04/14/2020 02:08 PM    Creatinine 0.78 04/14/2020 02:08 PM    BUN/Creatinine ratio 21 04/14/2020 02:08 PM    GFR est AA 85 04/14/2020 02:08 PM    GFR est non-AA 74 04/14/2020 02:08 PM    Calcium 9.5 04/14/2020 02:08 PM    Bilirubin, total 0.6 10/17/2017 03:12 PM    Alk. phosphatase 93 10/17/2017 03:12 PM    Protein, total 6.9 10/17/2017 03:12 PM    Albumin 4.3 10/17/2017 03:12 PM    Globulin 3.7 09/12/2014 01:25 PM    A-G Ratio 1.7 10/17/2017 03:12 PM    ALT (SGPT) 11 10/17/2017 03:12 PM       EKG:  NSR, LBBB       Assessment:          ICD-10-CM ICD-9-CM    1.  PAF (paroxysmal atrial fibrillation) ([facility]  I48.0 427.31 AMB POC EKG ROUTINE W/ 12 LEADS, INTER & REP      2. Coronary artery disease involving native coronary artery of native heart without angina pectoris  I25.10 414.01       3. S/P PTCA (percutaneous transluminal coronary angioplasty)  Z98.61 V45.82       4. Benign essential hypertension  I10 401.1       5. Mixed hyperlipidemia  E78.2 272.2       6. Left bundle-branch block  I44.7 426.3           Orders Placed This Encounter    AMB POC EKG ROUTINE W/ 12 LEADS, INTER & REP     Order Specific Question:   Reason for Exam:     Answer:   routine    doxycycline (MONODOX) 100 mg capsule     Sig: Take 100 mg by mouth two (2) times a day. Plan:     Coronary artery disease involving native coronary artery of native heart without angina pectoris  Hx of CLEMENTE to LAD in 5/2012  Lexiscan stress test done 12/2019 without evidence of ischemia  Without anginal or anginal equivalent symptoms  Continue ASA, BB, statin therapy    Pre-syncope: Both episodes sound like they occurred after bending over and then standing back up. Advised slow position changes allow body to make adjustments before moving on. Will check a 1 month event monitor to verify no arrhythmia, especially with left bundle branch block seen on EKG. PAF (paroxysmal atrial fibrillation) ([facility])  Maintaining sinus rhythm today  Echo done 12/2019 with preserved LVEF, grade 2 DD, mild MR and mod TR  Continue BB therapy  On Eliquis for stroke prevention. Benign essential hypertension  Currently on cardura 1 mg daily, toprol 50 mg daily, olmesartan/hct 40/25 mg daily  Did not previously tolerate chlorthalidone, or lasix.   BP at home mildly elevated, but I will not increase medications at this time because she was intolerant of other medications, and at times her blood pressure is as low as 912 systolic     Mixed hyperlipidemia  Continue statin therapy and low fat, low cholesterol diet  Lipids managed by PCP       Follow up in 1 year if event monitor is essentially normal, sooner as needed.        Gris Manzano MD

## 2022-10-07 NOTE — TELEPHONE ENCOUNTER
Pt called to follow up on medication refills, olmesartain-hydrochlorothiazide 40-25mg and eliquis 5mg        [facility] [phone]

## 2022-10-10 RX ORDER — OLMESARTAN MEDOXOMIL AND HYDROCHLOROTHIAZIDE 40/25 40; 25 MG/1; MG/1
1 TABLET ORAL DAILY
Qty: 90 TABLET | Refills: 0 | Status: SHIPPED | OUTPATIENT
Start: 2022-10-10

## 2022-10-10 RX ORDER — APIXABAN 5 MG/1
TABLET, FILM COATED ORAL
Qty: 180 TABLET | Refills: 0 | Status: SHIPPED | OUTPATIENT
Start: 2022-10-10

## 2022-10-10 NOTE — TELEPHONE ENCOUNTER
PCP: Mati Field MD    Last appt: 9/12/22  Future Appointments   Date Time Provider Kristin Estrada   9/18/2023 10:40 AM MD RACHEAL Caraballo BS AMB       Requested Prescriptions     Pending Prescriptions Disp Refills    Eliquis 5 mg tablet [Pharmacy Med Name: ELIQUIS 5 MG TABLET] 180 Tablet 0     Sig: TAKE 1 TABLET BY MOUTH TWICE A DAY    olmesartan-hydroCHLOROthiazide (BENICAR HCT) 40-25 mg per tablet [Pharmacy Med Name: OLMESARTAN-HCTZ 40-25 MG TAB] 90 Tablet 0     Sig: TAKE 1 TAB BY MOUTH DAILY. RESTARTED 7/15/20         Other Comments:  Verbal order per provider. Order (medication, dose, route, frequency, amount, refills) repeated and verified twice. Transmission completed and successful. Pt informed.

## 2022-10-20 ENCOUNTER — HOSPITAL ENCOUNTER (EMERGENCY)
Age: 82
Discharge: HOME | End: 2022-10-20
Attending: EMERGENCY MEDICINE
Payer: MEDICARE

## 2022-10-20 ENCOUNTER — APPOINTMENT (OUTPATIENT)
Dept: CT IMAGING | Age: 82
End: 2022-10-20
Attending: EMERGENCY MEDICINE
Payer: MEDICARE

## 2022-10-20 ENCOUNTER — TELEPHONE (OUTPATIENT)
Dept: CARDIOLOGY CLINIC | Age: 82
End: 2022-10-20

## 2022-10-20 ENCOUNTER — APPOINTMENT (OUTPATIENT)
Dept: GENERAL RADIOLOGY | Age: 82
End: 2022-10-20
Attending: EMERGENCY MEDICINE
Payer: MEDICARE

## 2022-10-20 VITALS
OXYGEN SATURATION: 93 % | HEART RATE: 83 BPM | RESPIRATION RATE: 13 BRPM | SYSTOLIC BLOOD PRESSURE: 139 MMHG | DIASTOLIC BLOOD PRESSURE: 66 MMHG | TEMPERATURE: 98.7 F

## 2022-10-20 DIAGNOSIS — I10 HYPERTENSION, UNSPECIFIED TYPE: ICD-10-CM

## 2022-10-20 DIAGNOSIS — R00.2 PALPITATIONS: Primary | ICD-10-CM

## 2022-10-20 LAB
ALBUMIN SERPL-MCNC: 3.3 G/DL (ref 3.5–5)
ALBUMIN/GLOB SERPL: 1 {RATIO} (ref 1.1–2.2)
ALP SERPL-CCNC: 88 U/L (ref 45–117)
ALT SERPL-CCNC: 16 U/L (ref 12–78)
ANION GAP SERPL CALC-SCNC: 6 MMOL/L (ref 5–15)
AST SERPL-CCNC: 18 U/L (ref 15–37)
BASOPHILS # BLD: 0 K/UL (ref 0–0.1)
BASOPHILS NFR BLD: 0 % (ref 0–1)
BILIRUB SERPL-MCNC: 0.4 MG/DL (ref 0.2–1)
BUN SERPL-MCNC: 13 MG/DL (ref 6–20)
BUN/CREAT SERPL: 17 (ref 12–20)
CALCIUM SERPL-MCNC: 9.2 MG/DL (ref 8.5–10.1)
CHLORIDE SERPL-SCNC: 103 MMOL/L (ref 97–108)
CO2 SERPL-SCNC: 29 MMOL/L (ref 21–32)
COMMENT, HOLDF: NORMAL
CREAT SERPL-MCNC: 0.76 MG/DL (ref 0.55–1.02)
DIFFERENTIAL METHOD BLD: ABNORMAL
EOSINOPHIL # BLD: 0.1 K/UL (ref 0–0.4)
EOSINOPHIL NFR BLD: 3 % (ref 0–7)
ERYTHROCYTE [DISTWIDTH] IN BLOOD BY AUTOMATED COUNT: 12.5 % (ref 11.5–14.5)
GLOBULIN SER CALC-MCNC: 3.4 G/DL (ref 2–4)
GLUCOSE SERPL-MCNC: 118 MG/DL (ref 65–100)
HCT VFR BLD AUTO: 38.3 % (ref 35–47)
HGB BLD-MCNC: 13 G/DL (ref 11.5–16)
IMM GRANULOCYTES # BLD AUTO: 0 K/UL (ref 0–0.04)
IMM GRANULOCYTES NFR BLD AUTO: 0 % (ref 0–0.5)
LYMPHOCYTES # BLD: 1.3 K/UL (ref 0.8–3.5)
LYMPHOCYTES NFR BLD: 25 % (ref 12–49)
MAGNESIUM SERPL-MCNC: 1.7 MG/DL (ref 1.6–2.4)
MCH RBC QN AUTO: 34 PG (ref 26–34)
MCHC RBC AUTO-ENTMCNC: 33.9 G/DL (ref 30–36.5)
MCV RBC AUTO: 100.3 FL (ref 80–99)
MONOCYTES # BLD: 0.7 K/UL (ref 0–1)
MONOCYTES NFR BLD: 13 % (ref 5–13)
NEUTS SEG # BLD: 3.1 K/UL (ref 1.8–8)
NEUTS SEG NFR BLD: 59 % (ref 32–75)
NRBC # BLD: 0 K/UL (ref 0–0.01)
NRBC BLD-RTO: 0 PER 100 WBC
PLATELET # BLD AUTO: 208 K/UL (ref 150–400)
PMV BLD AUTO: 10.1 FL (ref 8.9–12.9)
POTASSIUM SERPL-SCNC: 3.1 MMOL/L (ref 3.5–5.1)
PROT SERPL-MCNC: 6.7 G/DL (ref 6.4–8.2)
RBC # BLD AUTO: 3.82 M/UL (ref 3.8–5.2)
SAMPLES BEING HELD,HOLD: NORMAL
SODIUM SERPL-SCNC: 138 MMOL/L (ref 136–145)
TROPONIN-HIGH SENSITIVITY: 19 NG/L (ref 0–51)
TROPONIN-HIGH SENSITIVITY: 26 NG/L (ref 0–51)
WBC # BLD AUTO: 5.2 K/UL (ref 3.6–11)

## 2022-10-20 PROCEDURE — 84484 ASSAY OF TROPONIN QUANT: CPT

## 2022-10-20 PROCEDURE — 36415 COLL VENOUS BLD VENIPUNCTURE: CPT

## 2022-10-20 PROCEDURE — 71045 X-RAY EXAM CHEST 1 VIEW: CPT

## 2022-10-20 PROCEDURE — 93005 ELECTROCARDIOGRAM TRACING: CPT

## 2022-10-20 PROCEDURE — 96374 THER/PROPH/DIAG INJ IV PUSH: CPT

## 2022-10-20 PROCEDURE — 70450 CT HEAD/BRAIN W/O DYE: CPT

## 2022-10-20 PROCEDURE — 80053 COMPREHEN METABOLIC PANEL: CPT

## 2022-10-20 PROCEDURE — 83735 ASSAY OF MAGNESIUM: CPT

## 2022-10-20 PROCEDURE — 74011250637 HC RX REV CODE- 250/637: Performed by: EMERGENCY MEDICINE

## 2022-10-20 PROCEDURE — 74011250636 HC RX REV CODE- 250/636: Performed by: EMERGENCY MEDICINE

## 2022-10-20 PROCEDURE — 99285 EMERGENCY DEPT VISIT HI MDM: CPT

## 2022-10-20 PROCEDURE — 85025 COMPLETE CBC W/AUTO DIFF WBC: CPT

## 2022-10-20 RX ORDER — HYDRALAZINE HYDROCHLORIDE 20 MG/ML
20 INJECTION INTRAMUSCULAR; INTRAVENOUS ONCE
Status: COMPLETED | OUTPATIENT
Start: 2022-10-20 | End: 2022-10-20

## 2022-10-20 RX ORDER — POTASSIUM CHLORIDE 750 MG/1
40 TABLET, FILM COATED, EXTENDED RELEASE ORAL
Status: COMPLETED | OUTPATIENT
Start: 2022-10-20 | End: 2022-10-20

## 2022-10-20 RX ORDER — ACETAMINOPHEN 500 MG
1000 TABLET ORAL
Status: COMPLETED | OUTPATIENT
Start: 2022-10-20 | End: 2022-10-20

## 2022-10-20 RX ORDER — METOPROLOL SUCCINATE 50 MG/1
50 TABLET, EXTENDED RELEASE ORAL
Status: COMPLETED | OUTPATIENT
Start: 2022-10-20 | End: 2022-10-20

## 2022-10-20 RX ADMIN — POTASSIUM CHLORIDE 40 MEQ: 750 TABLET, FILM COATED, EXTENDED RELEASE ORAL at 20:19

## 2022-10-20 RX ADMIN — HYDRALAZINE HYDROCHLORIDE 20 MG: 20 INJECTION INTRAMUSCULAR; INTRAVENOUS at 20:20

## 2022-10-20 RX ADMIN — ACETAMINOPHEN 1000 MG: 500 TABLET ORAL at 21:50

## 2022-10-20 RX ADMIN — METOPROLOL SUCCINATE 50 MG: 50 TABLET, EXTENDED RELEASE ORAL at 18:03

## 2022-10-20 NOTE — ED PROVIDER NOTES
HPI   61-year-old female with a past medical history of coronary artery disease, atrial fibrillation on Eliquis, hypertension, and hyperlipidemia who presents to the emergency department due to palpitations as well as hypertension. She has had chest fluttering for the last few days. She says she recently wore a Holter monitor that was ordered by her cardiologist, but she is not sure the results of this. She does note some dyspnea on exertion but this seems chronic and not worsening for her. She denies any chest pain. She denies any leg swelling. No orthopnea. No lightheadedness or syncope. She says for the last 24 hours her blood pressure has been elevated in the 950O systolic at home despite taking her blood pressure medications. No headache. No nausea or vomiting. No abdominal pain. No fevers or chills. No diarrhea.   Past Medical History:   Diagnosis Date    Atrial fibrillation (HCC)     CAD (coronary artery disease)     Family history of skin cancer     Hypercholesteremia     Hypertension     Psychiatric disorder     depression    Skin cancer        Past Surgical History:   Procedure Laterality Date    AMB POC MOHS 1 STAGE H/N/HF/G      CARDIAC CATHETERIZATION  5/19/2012         COLONOSCOPY N/A 6/19/2019    COLONOSCOPY performed by Silke Jackson MD at [facility] ENDOSCOPY    DRUG ELUTING [address]  5/19/2012     Dr Hans Elliott [facility] [address]      HX MOHS PROCEDURES  04/13/2017    [facility] left superior helical rim by Dr. Alfonso Kerline PROCEDURES  12/26/2017    BCC L nasal sidewall by Dr. Joe Castro PTCA           Family History:   Problem Relation Age of Onset    Coronary Art Dis Other         daughter stent age 28    Stroke Mother     Coronary Art Dis Brother     Heart Attack Brother     Pacemaker Brother        Social History     Socioeconomic History    Marital status: SINGLE     Spouse name: Not on file    Number of children: Not on file    Years of education: Not on file Highest education level: Not on file   Occupational History    Not on file   Tobacco Use    Smoking status: Former     Packs/day: 0.50     Years: 50.00     Pack years: 25.00     Types: Cigarettes     Quit date: 5/25/2012     Years since quitting: 10.4    Smokeless tobacco: Never   Vaping Use    Vaping Use: Never used   Substance and Sexual Activity    Alcohol use: Not Currently     Comment: occasional    Drug use: No    Sexual activity: Not on file   Other Topics Concern    Not on file   Social History Narrative    Not on file     Social Determinants of Health     Financial Resource Strain: Not on file   Food Insecurity: Not on file   Transportation Needs: Not on file   Physical Activity: Not on file   Stress: Not on file   Social Connections: Not on file   Intimate Partner Violence: Not on file   Housing Stability: Not on file         ALLERGIES: Hygroton and Norvasc [amlodipine]    Review of Systems  A complete review of systems was performed and all systems reviewed were negative unless otherwise documented in the HPI    Vitals:    10/20/22 1638 10/20/22 1803   BP: (!) 195/75 (!) 185/71   Pulse: 74 66   Resp: 18    Temp: 98.7 °F (37.1 °C)    SpO2: 96%             Physical Exam  Constitutional:       Comments: Resting comfortably no acute distress. Not diaphoretic   HENT:      Mouth/Throat:      Comments: Moist mucous membranes  Eyes:      General: No scleral icterus. Extraocular Movements: Extraocular movements intact. Neck:      Comments: Trachea midline. No JVD  Cardiovascular:      Comments: Regular rate and rhythm. No murmurs. 2+ radial pulses bilaterally. Pulmonary:      Effort: Pulmonary effort is normal. No respiratory distress. Breath sounds: Normal breath sounds. No wheezing or rales. Abdominal:      General: There is no distension. Palpations: Abdomen is soft. Tenderness: There is no abdominal tenderness. Musculoskeletal:         General: Normal range of motion. Cervical back: Normal range of motion. Right lower leg: No edema. Left lower leg: No edema. Skin:     General: Skin is warm and dry. Neurological:      Comments: Awake and alert. Speech is normal.  GCS 15        MDM    ED Course as of 10/20/22 2156   Thu Oct 20, 2022   2134 EKG shows a sinus rhythm. Rate is 86. QTc is 526. She has PVCs. She has a left bundle branch block which has been seen previously. No concerning ST elevations or depressions. [MM]      ED Course User Index  [MM] Soledad Cain MD   16-year-old woman who presents to the above chief complaint. Patient is quite hypertensive at 195/75. Her other vital signs are stable. Her EKG shows normal sinus rhythm with PVCs which may be the cause of her palpitations. She is currently not in A. fib. She appears well on exam.  Labs and chest x-ray ordered. Patient mildly hypokalemic and this was supplemented orally. Initial high-sensitivity troponin was 19 with a repeat of 26. Low suspicion for ACS as she is not having chest pain. Chest x-ray unremarkable. She later developed a headache and CT head was ordered which shows no acute abnormalities. She was initially given her home dose of metoprolol and then later hydralazine with significant improvement in her blood pressures as well as her symptoms. Given her reassuring work-up she is deemed appropriate for discharge. Patient instructed to follow-up with her cardiologist soon as possible. She was discharged in stable condition.     Procedures

## 2022-10-20 NOTE — ED TRIAGE NOTES
Patient arrived via EMS w/ c/o \"chest fluttering\" and SOB w/ execration. Patient has a hx afib. Patient takes aspirin, atorvastatin, eliquis, metoprolol for afib.

## 2022-10-20 NOTE — TELEPHONE ENCOUNTER
Pt would like the doctor to call when the results of heart monitor are received, pt also stated afib is getting work, pt has not been feeling well for a couple of days, pt having fluttering in chest and heart skips beats, bp is erratic, please advise      Pt # [phone]

## 2022-10-21 LAB
ATRIAL RATE: 86 BPM
CALCULATED P AXIS, ECG09: 87 DEGREES
CALCULATED R AXIS, ECG10: -46 DEGREES
CALCULATED T AXIS, ECG11: 112 DEGREES
DIAGNOSIS, 93000: NORMAL
P-R INTERVAL, ECG05: 202 MS
Q-T INTERVAL, ECG07: 440 MS
QRS DURATION, ECG06: 152 MS
QTC CALCULATION (BEZET), ECG08: 526 MS
VENTRICULAR RATE, ECG03: 86 BPM

## 2022-10-21 NOTE — TELEPHONE ENCOUNTER
Verified Patient with two identifiers  This nurse explained patient that we will be calling her as soon as we have the results interpretations.

## 2022-10-21 NOTE — DISCHARGE INSTRUCTIONS
Return to the emergency department any new or worsening symptoms. In the meantime please follow-up with your cardiologist and primary care doctor soon as possible.

## 2022-10-24 ENCOUNTER — TELEPHONE (OUTPATIENT)
Dept: CARDIOLOGY CLINIC | Age: 82
End: 2022-10-24

## 2022-10-24 DIAGNOSIS — I44.7 LEFT BUNDLE-BRANCH BLOCK: Primary | ICD-10-CM

## 2022-10-24 DIAGNOSIS — I48.0 PAF (PAROXYSMAL ATRIAL FIBRILLATION) (HCC): ICD-10-CM

## 2022-10-24 DIAGNOSIS — I25.10 CORONARY ARTERY DISEASE INVOLVING NATIVE CORONARY ARTERY OF NATIVE HEART WITHOUT ANGINA PECTORIS: ICD-10-CM

## 2022-10-24 NOTE — TELEPHONE ENCOUNTER
[facility] End of Service Summary Report received. Findings:  42 events, 5 patient triggered  VT occurred 1 time with fastest run 115 BPM  Heart Block occurred 9 times. Slowest 55 bpm  PAC burden 1%  AVC burden 6%  Report left for Dr. Moises Wheat to review.

## 2022-10-30 NOTE — TELEPHONE ENCOUNTER
Please advise patient she had 1 run of fast heartbeats that might have been from the bottom chambers of the heart. It also might have been atrial fibrillation with what we call aberrant conduction. This sometimes can be a cause of fainting. I recommend we repeat Corbin Castleman, and schedule for electrophysiology consultation.

## 2022-10-31 NOTE — TELEPHONE ENCOUNTER
Verified Patient with two identifiers  Spoke with patient regarding results and recommendations. Patient voiced understanding. Nuclear [place] scheduled, instructions for given over the phone. Message sent to Dr. Roxanne Wilson  to make appt with physician.

## 2022-11-07 ENCOUNTER — ANCILLARY PROCEDURE (OUTPATIENT)
Dept: CARDIOLOGY CLINIC | Age: 82
End: 2022-11-07
Payer: MEDICARE

## 2022-11-07 VITALS
WEIGHT: 188 LBS | SYSTOLIC BLOOD PRESSURE: 130 MMHG | DIASTOLIC BLOOD PRESSURE: 70 MMHG | HEIGHT: 63 IN | BODY MASS INDEX: 33.31 KG/M2

## 2022-11-07 DIAGNOSIS — I44.7 LEFT BUNDLE-BRANCH BLOCK: ICD-10-CM

## 2022-11-07 DIAGNOSIS — I25.10 CORONARY ARTERY DISEASE INVOLVING NATIVE CORONARY ARTERY OF NATIVE HEART WITHOUT ANGINA PECTORIS: ICD-10-CM

## 2022-11-07 DIAGNOSIS — I48.0 PAF (PAROXYSMAL ATRIAL FIBRILLATION) (HCC): ICD-10-CM

## 2022-11-07 PROCEDURE — 78452 HT MUSCLE IMAGE SPECT MULT: CPT | Performed by: INTERNAL MEDICINE

## 2022-11-07 PROCEDURE — A9500 TC99M SESTAMIBI: HCPCS | Performed by: INTERNAL MEDICINE

## 2022-11-07 PROCEDURE — 93015 CV STRESS TEST SUPVJ I&R: CPT | Performed by: INTERNAL MEDICINE

## 2022-11-07 RX ORDER — TETRAKIS(2-METHOXYISOBUTYLISOCYANIDE)COPPER(I) TETRAFLUOROBORATE 1 MG/ML
10 INJECTION, POWDER, LYOPHILIZED, FOR SOLUTION INTRAVENOUS ONCE
Status: COMPLETED | OUTPATIENT
Start: 2022-11-07 | End: 2022-11-07

## 2022-11-07 RX ORDER — TETRAKIS(2-METHOXYISOBUTYLISOCYANIDE)COPPER(I) TETRAFLUOROBORATE 1 MG/ML
30 INJECTION, POWDER, LYOPHILIZED, FOR SOLUTION INTRAVENOUS ONCE
Status: COMPLETED | OUTPATIENT
Start: 2022-11-07 | End: 2022-11-07

## 2022-11-07 RX ADMIN — TETRAKIS(2-METHOXYISOBUTYLISOCYANIDE)COPPER(I) TETRAFLUOROBORATE 26.4 MILLICURIE: 1 INJECTION, POWDER, LYOPHILIZED, FOR SOLUTION INTRAVENOUS at 09:50

## 2022-11-07 RX ADMIN — TETRAKIS(2-METHOXYISOBUTYLISOCYANIDE)COPPER(I) TETRAFLUOROBORATE 8.8 MILLICURIE: 1 INJECTION, POWDER, LYOPHILIZED, FOR SOLUTION INTRAVENOUS at 09:00

## 2022-11-08 LAB
NUC STRESS EJECTION FRACTION: 69 %
STRESS BASELINE DIAS BP: 60 MMHG
STRESS BASELINE HR: 51 BPM
STRESS BASELINE SYS BP: 188 MMHG
STRESS O2 SAT PEAK: 99 %
STRESS O2 SAT REST: 98 %
STRESS PEAK DIAS BP: 70 MMHG
STRESS PEAK SYS BP: 130 MMHG
STRESS PERCENT HR ACHIEVED: 48 %
STRESS POST PEAK HR: 67 BPM
STRESS RATE PRESSURE PRODUCT: 8710 BPM*MMHG
STRESS TARGET HR: 140 BPM

## 2022-11-09 NOTE — PROGRESS NOTES
Malini-heart spell please advise that her stress test was mildly abnormal, and I feel that with her bowel and her abnormal heart rhythm on her monitor we should perform a heart catheterization to make sure there is no new blockage in an important artery of the heart. If willing, please order precatheterization labs including lipids and schedule ideally on a Wednesday first until that has 3 or 4 cases or second choice Thursday, maybe 930 or so. If she has questions and needs to discuss, please let me know. Copy to [facility] for her information and [place] as well for scheduling.

## 2022-11-10 ENCOUNTER — TELEPHONE (OUTPATIENT)
Dept: CARDIOLOGY CLINIC | Age: 82
End: 2022-11-10

## 2022-11-10 DIAGNOSIS — I48.0 PAF (PAROXYSMAL ATRIAL FIBRILLATION) (HCC): ICD-10-CM

## 2022-11-10 DIAGNOSIS — E78.2 MIXED HYPERLIPIDEMIA: ICD-10-CM

## 2022-11-10 DIAGNOSIS — I25.10 CORONARY ARTERY DISEASE INVOLVING NATIVE CORONARY ARTERY OF NATIVE HEART WITHOUT ANGINA PECTORIS: Primary | ICD-10-CM

## 2022-11-10 NOTE — TELEPHONE ENCOUNTER
----- Message from Nakia Davis MD sent at 11/8/2022  8:29 PM EST -----  Malini-heart spell please advise that her stress test was mildly abnormal, and I feel that with her bowel and her abnormal heart rhythm on her monitor we should perform a heart catheterization to make sure there is no new blockage in an important artery of the heart. If willing, please order precatheterization labs including lipids and schedule ideally on a Wednesday first until that has 3 or 4 cases or second choice Thursday, maybe 930 or so. If she has questions and needs to discuss, please let me know. Copy to [facility] for her information and [place] as well for scheduling.

## 2022-11-10 NOTE — TELEPHONE ENCOUNTER
Verified Patient with two identifiers  Spoke with patient regarding results and recommendations for Catheterization. Patient voiced understanding. Message sent to [facility] to schedule Cath. Lab slips mailed to address on file.

## 2022-11-11 ENCOUNTER — TELEPHONE (OUTPATIENT)
Dept: CARDIOLOGY CLINIC | Age: 82
End: 2022-11-11

## 2022-11-11 NOTE — TELEPHONE ENCOUNTER
Pt would like lab orders faxed to Lab stephan at [facility]  Please fax the lab order to [phone].

## 2022-11-11 NOTE — TELEPHONE ENCOUNTER
Scheduled pt for LHC at [facility] on 11/30/2022 with an arrival time of 10am with Dr Jenae Cunha. Pt to have labs done between 11/14 and 11/28. Reviewed instructions including no Eliquis on 11/28,11/29, and 11/30. Pt confirmed understanding and had no questions.

## 2022-11-22 ENCOUNTER — TELEPHONE (OUTPATIENT)
Dept: CARDIOLOGY CLINIC | Age: 82
End: 2022-11-22

## 2022-11-22 DIAGNOSIS — I25.83 CORONARY ARTERY DISEASE DUE TO LIPID RICH PLAQUE: ICD-10-CM

## 2022-11-22 DIAGNOSIS — E78.2 MIXED HYPERLIPIDEMIA: Primary | ICD-10-CM

## 2022-11-22 DIAGNOSIS — I25.10 CORONARY ARTERY DISEASE DUE TO LIPID RICH PLAQUE: ICD-10-CM

## 2022-11-22 LAB
ALBUMIN SERPL-MCNC: 4.3 G/DL (ref 3.7–4.7)
ALBUMIN/GLOB SERPL: 2 {RATIO} (ref 1.2–2.2)
ALP SERPL-CCNC: 99 IU/L (ref 44–121)
ALT SERPL-CCNC: 8 IU/L (ref 0–32)
AST SERPL-CCNC: 20 IU/L (ref 0–40)
BILIRUB SERPL-MCNC: 0.7 MG/DL (ref 0–1.2)
BUN SERPL-MCNC: 10 MG/DL (ref 8–27)
BUN/CREAT SERPL: 12 (ref 12–28)
CALCIUM SERPL-MCNC: 9.3 MG/DL (ref 8.7–10.3)
CHLORIDE SERPL-SCNC: 93 MMOL/L (ref 96–106)
CHOLEST SERPL-MCNC: 173 MG/DL (ref 100–199)
CO2 SERPL-SCNC: 29 MMOL/L (ref 20–29)
CREAT SERPL-MCNC: 0.81 MG/DL (ref 0.57–1)
EGFR: 73 ML/MIN/1.73
ERYTHROCYTE [DISTWIDTH] IN BLOOD BY AUTOMATED COUNT: 12.1 % (ref 11.7–15.4)
GLOBULIN SER CALC-MCNC: 2.2 G/DL (ref 1.5–4.5)
GLUCOSE SERPL-MCNC: 91 MG/DL (ref 70–99)
HCT VFR BLD AUTO: 39.5 % (ref 34–46.6)
HDLC SERPL-MCNC: 79 MG/DL
HGB BLD-MCNC: 13.3 G/DL (ref 11.1–15.9)
IMP & REVIEW OF LAB RESULTS: NORMAL
INR PPP: 1 (ref 0.9–1.2)
LDLC SERPL CALC-MCNC: 77 MG/DL (ref 0–99)
MCH RBC QN AUTO: 33.3 PG (ref 26.6–33)
MCHC RBC AUTO-ENTMCNC: 33.7 G/DL (ref 31.5–35.7)
MCV RBC AUTO: 99 FL (ref 79–97)
PLATELET # BLD AUTO: 220 X10E3/UL (ref 150–450)
POTASSIUM SERPL-SCNC: 3.9 MMOL/L (ref 3.5–5.2)
PROT SERPL-MCNC: 6.5 G/DL (ref 6–8.5)
PROTHROMBIN TIME: 10.9 SEC (ref 9.1–12)
RBC # BLD AUTO: 3.99 X10E6/UL (ref 3.77–5.28)
SODIUM SERPL-SCNC: 133 MMOL/L (ref 134–144)
TRIGL SERPL-MCNC: 93 MG/DL (ref 0–149)
VLDLC SERPL CALC-MCNC: 17 MG/DL (ref 5–40)
WBC # BLD AUTO: 5 X10E3/UL (ref 3.4–10.8)

## 2022-11-22 RX ORDER — SODIUM CHLORIDE 0.9 % (FLUSH) 0.9 %
5-40 SYRINGE (ML) INJECTION EVERY 8 HOURS
Status: CANCELLED | OUTPATIENT
Start: 2022-11-22

## 2022-11-22 RX ORDER — SODIUM CHLORIDE 9 MG/ML
100 INJECTION, SOLUTION INTRAVENOUS CONTINUOUS
Status: CANCELLED | OUTPATIENT
Start: 2022-11-22 | End: 2022-11-22

## 2022-11-22 RX ORDER — SODIUM CHLORIDE 9 MG/ML
25 INJECTION, SOLUTION INTRAVENOUS AS NEEDED
Status: CANCELLED | OUTPATIENT
Start: 2022-11-22

## 2022-11-22 RX ORDER — ATORVASTATIN CALCIUM 40 MG/1
40 TABLET, FILM COATED ORAL DAILY
Qty: 90 TABLET | Refills: 0 | Status: SHIPPED | OUTPATIENT
Start: 2022-11-22

## 2022-11-22 RX ORDER — SODIUM CHLORIDE 0.9 % (FLUSH) 0.9 %
5-40 SYRINGE (ML) INJECTION AS NEEDED
Status: CANCELLED | OUTPATIENT
Start: 2022-11-22

## 2022-11-22 NOTE — TELEPHONE ENCOUNTER
Verified Patient with two identifiers  Spoke with patient regarding results and recommendations. Patient voiced understanding. New prescription for Atorvastatin 40 mg sent to pharmacy on file. Bloodwork slips mailed to address on file to be done in 3 months/.

## 2022-11-22 NOTE — TELEPHONE ENCOUNTER
----- Message from Marsha Escalante NP sent at 11/22/2022  9:46 AM EST -----  Hugo Travis reviewed.  LDL not at goal of below 70, if taking atorva 20 mg daily, pls increase to 40 mg, will order repeat labwork post [facility] follow up    Other ;labwork ok

## 2022-11-22 NOTE — PROGRESS NOTES
Precath labwork reviewed.  LDL not at goal of below 70, if taking atorva 20 mg daily, pls increase to 40 mg, will order repeat labwork post [facility] follow up    Other ;labwork ok

## 2022-11-30 ENCOUNTER — HOSPITAL ENCOUNTER (OUTPATIENT)
Age: 82
Setting detail: OUTPATIENT SURGERY
Discharge: HOME | End: 2022-11-30
Attending: INTERNAL MEDICINE | Admitting: INTERNAL MEDICINE
Payer: MEDICARE

## 2022-11-30 VITALS
HEART RATE: 56 BPM | WEIGHT: 188 LBS | BODY MASS INDEX: 33.31 KG/M2 | RESPIRATION RATE: 16 BRPM | HEIGHT: 63 IN | DIASTOLIC BLOOD PRESSURE: 64 MMHG | OXYGEN SATURATION: 93 % | SYSTOLIC BLOOD PRESSURE: 140 MMHG | TEMPERATURE: 98.9 F

## 2022-11-30 DIAGNOSIS — I25.10 CORONARY ARTERY DISEASE DUE TO LIPID RICH PLAQUE: ICD-10-CM

## 2022-11-30 DIAGNOSIS — I25.9 MYOCARDIAL ISCHEMIA: ICD-10-CM

## 2022-11-30 DIAGNOSIS — I25.83 CORONARY ARTERY DISEASE DUE TO LIPID RICH PLAQUE: ICD-10-CM

## 2022-11-30 DIAGNOSIS — E78.2 MIXED HYPERLIPIDEMIA: Primary | ICD-10-CM

## 2022-11-30 DIAGNOSIS — I47.20 VENTRICULAR TACHYCARDIA: ICD-10-CM

## 2022-11-30 LAB — ACT BLD: 263 SECS (ref 79–138)

## 2022-11-30 PROCEDURE — C1894 INTRO/SHEATH, NON-LASER: HCPCS | Performed by: INTERNAL MEDICINE

## 2022-11-30 PROCEDURE — 74011000250 HC RX REV CODE- 250: Performed by: INTERNAL MEDICINE

## 2022-11-30 PROCEDURE — C1769 GUIDE WIRE: HCPCS | Performed by: INTERNAL MEDICINE

## 2022-11-30 PROCEDURE — 74011000636 HC RX REV CODE- 636: Performed by: INTERNAL MEDICINE

## 2022-11-30 PROCEDURE — 85347 COAGULATION TIME ACTIVATED: CPT

## 2022-11-30 PROCEDURE — 77030019569 HC BND COMPR RAD TERU -B: Performed by: INTERNAL MEDICINE

## 2022-11-30 PROCEDURE — 93571 IV DOP VEL&/PRESS C FLO 1ST: CPT | Performed by: INTERNAL MEDICINE

## 2022-11-30 PROCEDURE — 93458 L HRT ARTERY/VENTRICLE ANGIO: CPT | Performed by: INTERNAL MEDICINE

## 2022-11-30 PROCEDURE — 2709999900 HC NON-CHARGEABLE SUPPLY: Performed by: INTERNAL MEDICINE

## 2022-11-30 PROCEDURE — 77030008543 HC TBNG MON PRSS MRTM -A: Performed by: INTERNAL MEDICINE

## 2022-11-30 PROCEDURE — 99153 MOD SED SAME PHYS/QHP EA: CPT | Performed by: INTERNAL MEDICINE

## 2022-11-30 PROCEDURE — C1887 CATHETER, GUIDING: HCPCS | Performed by: INTERNAL MEDICINE

## 2022-11-30 PROCEDURE — 99152 MOD SED SAME PHYS/QHP 5/>YRS: CPT | Performed by: INTERNAL MEDICINE

## 2022-11-30 PROCEDURE — 76937 US GUIDE VASCULAR ACCESS: CPT | Performed by: INTERNAL MEDICINE

## 2022-11-30 PROCEDURE — 74011250636 HC RX REV CODE- 250/636: Performed by: INTERNAL MEDICINE

## 2022-11-30 PROCEDURE — 77030012468 HC VLV BLEEDBK CNTRL ABBT -B: Performed by: INTERNAL MEDICINE

## 2022-11-30 PROCEDURE — 77030040934 HC CATH DIAG DXTERITY MEDT -A: Performed by: INTERNAL MEDICINE

## 2022-11-30 PROCEDURE — 77030013744: Performed by: INTERNAL MEDICINE

## 2022-11-30 RX ORDER — VERAPAMIL HYDROCHLORIDE 2.5 MG/ML
INJECTION, SOLUTION INTRAVENOUS AS NEEDED
Status: DISCONTINUED | OUTPATIENT
Start: 2022-11-30 | End: 2022-11-30 | Stop reason: HOSPADM

## 2022-11-30 RX ORDER — FENTANYL CITRATE 50 UG/ML
INJECTION, SOLUTION INTRAMUSCULAR; INTRAVENOUS AS NEEDED
Status: DISCONTINUED | OUTPATIENT
Start: 2022-11-30 | End: 2022-11-30 | Stop reason: HOSPADM

## 2022-11-30 RX ORDER — ACETAMINOPHEN 325 MG/1
650 TABLET ORAL
Status: CANCELLED | OUTPATIENT
Start: 2022-11-30

## 2022-11-30 RX ORDER — SODIUM CHLORIDE 9 MG/ML
75 INJECTION, SOLUTION INTRAVENOUS CONTINUOUS
Status: DISCONTINUED | OUTPATIENT
Start: 2022-11-30 | End: 2022-11-30 | Stop reason: HOSPADM

## 2022-11-30 RX ORDER — LIDOCAINE HYDROCHLORIDE 10 MG/ML
INJECTION INFILTRATION; PERINEURAL AS NEEDED
Status: DISCONTINUED | OUTPATIENT
Start: 2022-11-30 | End: 2022-11-30 | Stop reason: HOSPADM

## 2022-11-30 RX ORDER — DOCUSATE SODIUM 100 MG/1
100 CAPSULE, LIQUID FILLED ORAL
COMMUNITY

## 2022-11-30 RX ORDER — HEPARIN SODIUM 200 [USP'U]/100ML
INJECTION, SOLUTION INTRAVENOUS
Status: COMPLETED | OUTPATIENT
Start: 2022-11-30 | End: 2022-11-30

## 2022-11-30 RX ORDER — MIDAZOLAM HYDROCHLORIDE 1 MG/ML
INJECTION, SOLUTION INTRAMUSCULAR; INTRAVENOUS AS NEEDED
Status: DISCONTINUED | OUTPATIENT
Start: 2022-11-30 | End: 2022-11-30 | Stop reason: HOSPADM

## 2022-11-30 RX ORDER — HEPARIN SODIUM 1000 [USP'U]/ML
INJECTION, SOLUTION INTRAVENOUS; SUBCUTANEOUS AS NEEDED
Status: DISCONTINUED | OUTPATIENT
Start: 2022-11-30 | End: 2022-11-30 | Stop reason: HOSPADM

## 2022-11-30 RX ADMIN — SODIUM CHLORIDE 75 ML/HR: 9 INJECTION, SOLUTION INTRAVENOUS at 10:02

## 2022-11-30 NOTE — PROGRESS NOTES
Transfer to [facility] RR from Procedure Area    Verbal report given to ANTONINO Berg on Daya Jimenez being transferred to Cardiac Cath Lab RR for routine progression of care   Patient is post [facility] procedure. Patient stable upon transfer to . Report consisted of patients Situation, Background, Assessment and   Recommendations(SBAR). Information from the following report(s) Procedure Summary was reviewed with the receiving nurse. Opportunity for questions and clarification was provided. Patient medicated during procedure with orders obtained and verified by Dr. Jenae Cunha. Refer to patient PROCEDURE REPORT for vital signs, assessment, status, and response during procedure.

## 2022-11-30 NOTE — DISCHARGE INSTRUCTIONS
Radial Cardiac Catheterization Discharge Instructions    It is normal to feel tired the first couple days. Take it easy and follow the physicians instructions. CHECK THE CATHETER INSERTION SITE DAILY:    Remove the wrist dressing 24 hours after the procedure. You may shower 24 hours after the procedure. Wash with soap and water and pat dry. Gentle cleaning of the site with soap and water is sufficient, cover with a dry clean dressing or bandage. Do not apply creams or powders to the area. No soaking the wrist for 3 days. Leave the puncture site open to air after 24 hours post-procedure. CALL THE PHYSICIANS:     If the site becomes red, swollen or feels warm to the touch  If there is bleeding or drainage or if there is unusual pain at the radial site. If there is any minor oozing, you may apply a band-aid and remove after 12 hours. If the bleeding continues, hold pressure with the middle finger against the puncture site and the thumb against the back of the wrist,call 911 to be transported to the hospital.  DO NOT DRIVE YOURSELF, [facility]. ACTIVITY:   For the first 24 hours do not manipulate the wrist.  No lifting, pushing or pulling over 3-5 pounds with the affected wrist for 7 daysand no straining the insertion site. Do not life grocery bags or the garbage can, do not run the vacuum  or  for 7 days. Start with short walks as in the hospital and gradually increase as tolerated each day. It is recommended to walk 30 minutes 5-7 days per week. Follow your physicians instructions on activity. Avoid walking outside in extremes of heat or cold. Walk inside when it is cold and windy or hot and humid. Things to keep in mind:  No driving for at least 24 hours, or as designated by your physician. Limit the number of times you go up and down the stairs  Take rests and pace yourself with activity.   Be careful and do not strain with bowel movements. MEDICATIONS:    Take all medications as prescribed  Call your physician if you have any questions  Keep an updated list of your medications with you at all times and give a list to your physician and pharmacist    SIGNS AND SYMPTOMS:   Be cautious of symptoms of angina or recurrent symptoms such as chest discomfort, unusual shortness of breath or fatigue. These could be symptoms of restenosis, a new blockage or a heart attack. If your symptoms are relieved with rest it is still recommended that you notify your physician of recurrent chest pain or discomfort. For CHEST PAIN or symptoms of angina not relieved with rest:  If the discomfort is not relieved with rest, and you have been prescribed Nitroglycerin, take as directed (taken under the tongue, one at a time 5 minutes apart for a total of 3 doses). If the discomfort is not relieved after the 3rd nitroglycerin, call 911. If you have not been prescribed Nitroglycerin  and your chest discomfort is not relieved with rest, call 911. AFTER CARE:   Follow up with your physician as instructed. Follow a heart healthy diet with proper portion control, daily stress management, daily exercise, blood pressure and cholesterol control , and smoking cessation.

## 2022-11-30 NOTE — PROGRESS NOTES
Cardiac Cath Lab Recovery Arrival Note:      Jef Tran arrived to Cardiac Cath Lab, Recovery Area. Staff introduced to patient. Patient identifiers verified with NAME and DATE OF BIRTH. Procedure verified with patient. Consent forms reviewed and signed by patient or authorized representative and verified. Allergies verified. Patient and family oriented to department. Patient and family informed of procedure and plan of care. Questions answered with review. Patient prepped for procedure, per orders from physician, prior to arrival.    Patient on cardiac monitor, non-invasive blood pressure, SPO2 monitor. On room air. Patient is A&Ox 4. Patient reports no pain. Patient in stretcher, in low position, with side rails up, call bell within reach, patient instructed to call if assistance as needed. Patient prep in: [address], [place] [address]. Patient family has pager [email or web address]  Family in: [phone].    Prep by: CECY

## 2022-11-30 NOTE — PROGRESS NOTES
Postoperative discharge instructions reviewed with patient and all questions answered. Patient verbalizes understanding. TR band removed and tegederm dressing applied. 1555 Patient ambulated to the bathroom to void and is steady on her feet; no complaints of dizziness. Noted hematoma on right wrist when patient returned from bathroom and small amount of bleeding. Manual compression applied with reduction in hematoma. Right wrist area is soft with some bruising noted. Will watch patient for 30 minutes and continue to assess right wrist site.

## 2022-11-30 NOTE — PROGRESS NOTES
Patient dressed with minimal assistance. Right wrist site looks good with no hematoma noted. Only bruising noted. Discharged at [phone] via wheelchair to home accompanied by daughter.  Armboard in place right wrist.

## 2022-11-30 NOTE — PROGRESS NOTES
TRANSFER - IN REPORT:    Verbal report received from [facility] on Esther Salas  being received from cardiac catheterization for routine progression of care. Report consisted of patients Situation, Background, Assessment and Recommendations(SBAR). Information from the following report(s) Procedure Summary was reviewed with the receiving clinician. Opportunity for questions and clarification was provided. Assessment completed upon patients arrival to [address] [place] [address] and care assumed. Cardiac Cath Lab Recovery Arrival Note:    Esther Salas arrived to [facility] recovery area. Patient procedure= cardiac catheterization. Patient on cardiac monitor, non-invasive blood pressure, SPO2 monitor. On room air IV capped. . Patient status doing well without problems. Patient is A&Ox 4. Patient reports no pain. PROCEDURE SITE CHECK:    Procedure site:without any bleeding and no hematoma, no pain/discomfort reported at procedure site. No change in patient status. Continue to monitor patient and status. 1345 Patient eating lunch and tolerating it well with no nausea.

## 2022-11-30 NOTE — PROGRESS NOTES
TRANSFER - OUT REPORT:    Verbal report given to [facility] on Fiona Boyle  being transferred to [facility] for routine progression of care       Report consisted of patients Situation, Background, Assessment and   Recommendations(SBAR). Information from the following report(s) Procedure Summary was reviewed with the receiving nurse. Lines:   Peripheral IV 11/30/22 Anterior;Proximal;Right Forearm (Active)        Opportunity for questions and clarification was provided.       Patient transported with:   Monitor  RN

## 2022-11-30 NOTE — PROGRESS NOTES
Cardiac Cath Lab Procedure Area Arrival Note:    Lala Anderson arrived to Cardiac Cath Lab, Procedure Area. Patient identifiers verified with NAME and DATE OF BIRTH. Procedure verified with patient. Consent forms verified. Allergies verified. Patient informed of procedure and plan of care. Questions answered with review. Patient voiced understanding of procedure and plan of care. Patient on cardiac monitor, non-invasive blood pressure, SPO2 monitor. On RA. Patient status doing well without problems. Patient is A&Ox 4. Patient reports no complaints. Patient medicated during procedure with orders obtained and verified by Dr. Baldev Navarrete. Refer to patients Cardiac Cath Lab PROCEDURE REPORT for vital signs, assessment, status, and response during procedure, printed at end of case. Printed report on chart or scanned into chart.

## 2022-12-01 ENCOUNTER — TRANSCRIBE ORDER (OUTPATIENT)
Dept: CARDIAC REHAB | Age: 82
End: 2022-12-01

## 2022-12-01 DIAGNOSIS — Z98.61 POSTSURGICAL PERCUTANEOUS TRANSLUMINAL CORONARY ANGIOPLASTY STATUS: Primary | ICD-10-CM

## 2022-12-09 ENCOUNTER — OFFICE VISIT (OUTPATIENT)
Dept: CARDIOLOGY CLINIC | Age: 82
End: 2022-12-09
Payer: MEDICARE

## 2022-12-09 VITALS
HEART RATE: 56 BPM | RESPIRATION RATE: 16 BRPM | DIASTOLIC BLOOD PRESSURE: 96 MMHG | OXYGEN SATURATION: 98 % | BODY MASS INDEX: 33.31 KG/M2 | HEIGHT: 63 IN | SYSTOLIC BLOOD PRESSURE: 142 MMHG | WEIGHT: 188 LBS

## 2022-12-09 DIAGNOSIS — Z98.61 S/P PTCA (PERCUTANEOUS TRANSLUMINAL CORONARY ANGIOPLASTY): ICD-10-CM

## 2022-12-09 DIAGNOSIS — I10 BENIGN ESSENTIAL HYPERTENSION: ICD-10-CM

## 2022-12-09 DIAGNOSIS — I48.0 PAF (PAROXYSMAL ATRIAL FIBRILLATION) (HCC): ICD-10-CM

## 2022-12-09 DIAGNOSIS — E78.2 MIXED HYPERLIPIDEMIA: ICD-10-CM

## 2022-12-09 DIAGNOSIS — R42 POSTURAL DIZZINESS WITH PRESYNCOPE: Primary | ICD-10-CM

## 2022-12-09 DIAGNOSIS — R55 POSTURAL DIZZINESS WITH PRESYNCOPE: Primary | ICD-10-CM

## 2022-12-09 DIAGNOSIS — I44.7 LEFT BUNDLE-BRANCH BLOCK: ICD-10-CM

## 2022-12-09 DIAGNOSIS — I25.10 CORONARY ARTERY DISEASE INVOLVING NATIVE CORONARY ARTERY OF NATIVE HEART WITHOUT ANGINA PECTORIS: ICD-10-CM

## 2022-12-09 RX ORDER — SPIRONOLACTONE 25 MG/1
25 TABLET ORAL DAILY
Qty: 90 TABLET | Refills: 1 | Status: SHIPPED | OUTPATIENT
Start: 2022-12-09

## 2022-12-09 RX ORDER — OLMESARTAN MEDOXOMIL 40 MG/1
40 TABLET ORAL DAILY
Qty: 90 TABLET | Refills: 1 | Status: SHIPPED | OUTPATIENT
Start: 2022-12-09

## 2022-12-09 RX ORDER — FUROSEMIDE 20 MG/1
20 TABLET ORAL
Qty: 30 TABLET | Refills: 5 | Status: SHIPPED | OUTPATIENT
Start: 2022-12-09

## 2022-12-09 NOTE — PROGRESS NOTES
Cardiac Electrophysiology OFFICE Consultation Note       Assessment/Plan:   1. Postural dizziness with presyncope  2. PAF (paroxysmal atrial fibrillation) ([facility])  -     AMB POC EKG ROUTINE W/ 12 LEADS, INTER & REP  3. S/P PTCA (percutaneous transluminal coronary angioplasty)  -     AMB POC EKG ROUTINE W/ 12 LEADS, INTER & REP  4. Left bundle-branch block  5. Mixed hyperlipidemia  6. Coronary artery disease involving native coronary artery of native heart without angina pectoris  7. Benign essential hypertension     Presyncope  History of multiple episodes of dizziness, some which associated with positional changes. She has had extensive work-up included event monitor which demonstrated PVC burden of 6%, 2 isolated runs of NSVT. She underwent Nuclear Stress with LVEF of 69% and possible ischemia. -LHC (11/30/22): nonobstructive CAD with 50% LAD with IRF of 0.9 c/w no HD significance. - She has clear evidence of PVCs which is likely contributing to her symptoms of palpitations and skipped beats. Review of her labs demonstrated prior potassium of 3.1. I am concerned that hydrochlorothiazide could be contributing to occasional intermittent hypokalemia resulting in increased in ectopy burden. Furthermore for unclear reason she is on doxazosin for management of high blood pressure which likely could contribute to orthostatic hypotension.  - While she does have left bundle branch block she has never had full syncope.   I explained that if she has syncopal event in the future, she would qualify for pacemaker implantation  - We will stop hydrochlorothiazide to prevent hypokalemic induced PVCs  - We will stop doxazosin to prevent orthostatic hypotension  - Given her hypertension, we will continue olmesartan, metoprolol succinate  - We will add spironolactone 25 mg daily  - check basic metabolic panel 7 to 10 days post initiation of spironolactone  - History of lower extremity edema, will add Lasix 20 mg as needed for lower extremity edema    Labile high blood pressure  - Management of antihypertensive as noted above    History of CAD  - Recent cardiac catheterization with nonobstructive CAD  - Continue aspirin and statin therapy      PVCs  [place] of 6%, symptomatic  - We will stop hydrochlorothiazide as noted above  - Continue beta-blocker    Left bundle branch block  History of presyncope but no grecia syncope. Will need pacemaker if she loses consciousness in the future. Severe obesity  I have discussed with the patient the need to exercise and lose weight. Encouraged increased physical activity as able and reduced caloric intake. Subjective:       Indiana Starr is a [de-identified] y.o. patient who is seen for evaluation of  near syncope, Afib. In September 2022, she complained that she had a couple episodes where she felt dizzy, sweaty all over, and felt like she was about to pass out. She sat down and they resolved. Both episodes seemed to have occurred after bending over and then standing back up. She has been monitoring blood pressures at home and they typically are running about 140s to 150s. Event monitor showed PVCs burden of 6%, a short run of wide-complex tachycardia, likely nonsustained ventricular tachycardia, and subsequent nuclear stress test was abnormal as noted below. Due to concern for ischemia driven ventricular tachycardia, she underwent left heart cath which demonstrated nonobstructive CAD. Patient Active Problem List   Diagnosis Code    Benign essential hypertension I10    Hyperlipidemia E78.5    Depression F32. A    Left bundle-branch block I44.7    CAD (coronary artery disease) I25.10    S/P PTCA (percutaneous transluminal coronary angioplasty) Z98.61    PAF (paroxysmal atrial fibrillation) ([facility]) I48.0    Postural dizziness with presyncope R42, R55     Current Outpatient Medications   Medication Sig Dispense Refill    docusate sodium (COLACE) 100 mg capsule Take 100 mg by mouth daily as needed for Constipation. atorvastatin (Lipitor) 40 mg tablet Take 1 Tablet by mouth daily. Refills per Dr. Mary Delong who is doing labs, please 90 Tablet 0    Eliquis 5 mg tablet TAKE 1 TABLET BY MOUTH TWICE A  Tablet 0    metoprolol succinate (TOPROL-XL) 50 mg XL tablet TAKE 1 TABLET BY MOUTH EVERY DAY AT NIGHT 90 Tablet 1    nitroglycerin (NITROSTAT) 0.4 mg SL tablet 1 Tablet by SubLINGual route every five (5) minutes as needed (call 911 if not relieved by 3). 25 Tablet 1    pantoprazole (PROTONIX) 20 mg tablet Take 20 mg by mouth daily. gabapentin (NEURONTIN) 100 mg capsule Take 100 mg by mouth three (3) times daily. albuterol (PROVENTIL HFA, VENTOLIN HFA, PROAIR HFA) 90 mcg/actuation inhaler INHALE 2 PUFFS BY MOUTH EVERY 4 HOURS AS NEEDED  0    polyethylene glycol (MIRALAX) 17 gram/dose powder Take 17 g by mouth as needed. calcium carbonate (OS-KYLE) 500 mg calcium (1,250 mg) tablet Take  by mouth daily. ZOLOFT 25 mg tablet Take 50 mg by mouth daily. 2    b complex vitamins tablet Take 1 Tab by mouth daily. omega-3 fatty acids-vitamin e 1,000 mg cap Take 1 Cap by mouth daily. 788mg daily      aspirin 81 mg chewable tablet Take 1 Tab by mouth daily.  30 Tab 99     Allergies   Allergen Reactions    Hygroton Vertigo     Dizziness, hypotension, SOB , chest pain & swelling    Norvasc [Amlodipine] Swelling     Past Medical History:   Diagnosis Date    Atrial fibrillation (HCC)     CAD (coronary artery disease)     Family history of skin cancer     Hypercholesteremia     Hypertension     Psychiatric disorder     depression    Skin cancer      Past Surgical History:   Procedure Laterality Date    AMB POC MOHS 1 STAGE H/N/HF/G      CARDIAC CATHETERIZATION  5/19/2012         COLONOSCOPY N/A 6/19/2019    COLONOSCOPY performed by Delvis Monaco MD at [facility] ENDOSCOPY    DRUG ELUTING [address]  5/19/2012     Dr Henderson Cluster PROCEDURES  04/13/2017 BCC left superior helical rim by Dr. Mcleod Nazanin PROCEDURES  12/26/2017    BCC L nasal sidewall by Dr. Alex Ross PTCA       Family History   Problem Relation Age of Onset    Coronary Art Dis Other         daughter stent age 28    Stroke Mother     Coronary Art Dis Brother     Heart Attack Brother     Pacemaker Brother      Social History     Tobacco Use    Smoking status: Former     Packs/day: 0.50     Years: 50.00     Pack years: 25.00     Types: Cigarettes     Quit date: 5/25/2012     Years since quitting: 10.5    Smokeless tobacco: Never   Substance Use Topics    Alcohol use: Not Currently     Comment: occasional        Review of Systems:   12 point review of systems was performed. All negative except for HPI     Objective:   BP (!) 142/96 (BP 1 Location: Left upper arm, BP Patient Position: Sitting, BP Cuff Size: Adult)   Pulse (!) 56   Resp 16   Ht 5' 3\" (1.6 m)   Wt 188 lb (85.3 kg)   SpO2 98%   BMI 33.30 kg/m²     Physical Exam:   General:  Alert and oriented, in no acute distress  Head:  Atraumatic, normocephalic  Eyes:  extraocular muscles intact  Neck:  Supple, normal range of motion  Lungs:  Clear to auscultation bilaterally, no wheezes/rales/rhonchi   Cardiovascular:  Regular rate and rhythm, normal S1-S2, no murmurs/rubs/gallops  Abdomen:  Soft, nontender, nondistended, normoactive bowel sounds  Skin:  Intact, no rash  Extremities:, no clubbing, cyanosis, or edema  Musculoskeletal: normal range of motion  Neurological:  Alert and oriented, no focal neurologic deficits  Psychiatric:  Normal mood and affect    No results found for: HBA1C, UKP9RJGJ, YLH8IGEW, VTH4CKYS    12/17/19    ECHO ADULT COMPLETE 12/19/2019 12/19/2019    Interpretation Summary  · Normal cavity size and systolic function (ejection fraction normal). Mild concentric hypertrophy. Estimated left ventricular ejection fraction is 55 - 60%. No regional wall motion abnormality noted.  Moderate (grade 2) left ventricular diastolic dysfunction. · Mild mitral valve regurgitation is present. · Moderate tricuspid valve regurgitation is present. · Mild pulmonic valve regurgitation is present. Signed by: Holly Eng MD on 12/19/2019  1:13 PM    11/30/22    CARDIAC PROCEDURE 11/30/2022 11/30/2022  INVASIVE VASCULAR PROCEDURE 11/30/2022 11/30/2022    Conclusion  · Ultrasound-guided right radial artery access. · Angiographically significant one-vessel CAD consisting of a 100% chronic total occlusion of the proximal RCA, filling by left to right collaterals. · Widely patent pre-existing proximal LAD stent. · Nonobstructive coronary artery disease of the LAD, with pressure wire interrogation of the 50% LAD lesion revealing an IFR of 0.90, consistent with no hemodynamically significant flow limitation. · Aggressive medical management is recommended. Signed by: Olya Oliva MD on 11/30/2022  5:51 PM    11/07/22    NUCLEAR CARDIAC STRESS TEST 11/08/2022 11/17/2022    Interpretation Summary    Stress Test: A pharmacological stress test was performed using lexiscan. Hemodynamics are adequate for diagnosis. Blood pressure demonstrated a normal response and heart rate demonstrated a normal response to stress. The patient's heart rate recovery was normal.    ECG: Stress ECG was not diagnostic due to left bundle branch block. Nuclear Findings: Normal left ventricular systolic function post-stress. Post-stress ejection fraction is 69%. Nuclear Findings: LV perfusion is abnormal.    Nuclear Findings: There is a mild severity left ventricular stress perfusion defect that is small in size present in the mid anterior segment(s) that is reversible. The defect appears to be probable ischemia. Nuclear Findings: Findings suggest a moderate risk of myocardial ischemia.     Signed by: Olya Oliva MD on 11/8/2022  8:24 PM    Results for orders placed or performed during the hospital encounter of 10/20/22   EKG, 12 LEAD, INITIAL   Result Value Ref Range    Ventricular Rate 86 BPM    Atrial Rate 86 BPM    P-R Interval 202 ms    QRS Duration 152 ms    Q-T Interval 440 ms    QTC Calculation (Bezet) 526 ms    Calculated P Axis 87 degrees    Calculated R Axis -46 degrees    Calculated T Axis 112 degrees    Diagnosis       Sinus rhythm with frequent premature ventricular complexes  Left axis deviation  Left bundle branch block  Abnormal ECG  When compared with ECG of 12-SEP-2014 12:47,  premature ventricular complexes are now present  QRS axis shifted left  T wave inversion more evident in Lateral leads  Confirmed by Gayle Camacho MD, Lesly Martinez ([id number]) on 10/21/2022 9:45:04 AM     Results for orders placed or performed in visit on 10/05/17   CARDIAC HOLTER MONITOR, 24 HOURS    Narrative    ECG Monitor/24 hours, Complete    Reason for Holter Monitor   PALPITATIONS    Heartbeat    Slowest 48  Average 60  Fastest  91      Results:   Underlying Rhythm: Normal sinus rhythm      Atrial Arrhythmias: premature atrial contractions; rare and 4 short runs of symptomatic paroxysmal atrial fibrillation with RVR    AV Conduction: bundle branch block    Ventricular Arrhythmias: premature ventricular contractions; rare     ST Segment Analysis:non-specific changes     Symptom Correlation:  Palpitations correspond to PAF with RVR. Briseida Herman MD                Thank you for involving me in this patient's care and please call with further concerns or questions. ________________________________________  An Lubna Beth MD, [facility]  Cardiac Electrophysiology  [facility]  [address] [place] [address]                             [phone]     [address] [place] [address].  [address] [place] [address], [id number] [place] [address]  [phone]

## 2022-12-09 NOTE — PATIENT INSTRUCTIONS
If you have fainting episodes in the future, we need to implant a pacemaker  Stop Benicar HCT  Continue Olmesartan 40 mg daily  Stop Doxazosin today  Start Spironolactone 25 mg daily (BP management)  Check BMP in 7-10 days  Take Lasix 20 mg as needed for lower extremities edema  FU with Dr. Rebeca Caban in 1 year

## 2022-12-20 ENCOUNTER — TELEPHONE (OUTPATIENT)
Dept: CARDIOLOGY CLINIC | Age: 82
End: 2022-12-20

## 2022-12-20 NOTE — TELEPHONE ENCOUNTER
----- Message from Melania Dawn NP sent at 12/20/2022  8:45 AM EST -----  Potassium looks better with initiation of spironolactone. How is BP trending? Thanks!      ----- Message -----  From: Devika Moore Lab Results In  Sent: 12/20/2022   5:36 AM EST  To: Mitra Phillips MD      Called patient, no answer. LM to have  call returned to [phone]. Need to inform patient of above and inquire about BP readings.

## 2022-12-20 NOTE — TELEPHONE ENCOUNTER
Returned patient call, ID verified using two patient identifiers. Informed patient that per Hanley Cockayne, NP her potassium is better since starting the Spironolactone. Patient states she feels better, she is less SOB with exertion and she does not have any chest discomfort. BP readings systolic are 481-641'R diastolic in the 59'A. Today BP was 152/78 & 156/76. Patient appreciative of call and verbalizes understanding of all information.

## 2023-01-16 RX ORDER — APIXABAN 5 MG/1
TABLET, FILM COATED ORAL
Qty: 180 TABLET | Refills: 1 | Status: SHIPPED | OUTPATIENT
Start: 2023-01-16

## 2023-01-16 NOTE — TELEPHONE ENCOUNTER
Refill Request Received for the Following Medication     Requested Prescriptions     Pending Prescriptions Disp Refills    Eliquis 5 mg tablet [Pharmacy Med Name: ELIQUIS 5 MG TABLET] 180 Tablet 0     Sig: TAKE 1 TABLET BY MOUTH TWICE A DAY       Last Prescribed:10-10-22    Last Appointment With Me:  9/12/2022     Future Appointments:  Future Appointments   Date Time Provider Kristin Estrada   9/18/2023 10:40 AM MD RACHEAL [place]   12/19/2023 10:00 AM Mitra Wylie MD [facility]

## 2023-02-18 LAB
ALBUMIN SERPL-MCNC: 4.3 G/DL (ref 3.7–4.7)
ALBUMIN/GLOB SERPL: 2.3 {RATIO} (ref 1.2–2.2)
ALP SERPL-CCNC: 104 IU/L (ref 44–121)
ALT SERPL-CCNC: 7 IU/L (ref 0–32)
AST SERPL-CCNC: 20 IU/L (ref 0–40)
BILIRUB SERPL-MCNC: 0.5 MG/DL (ref 0–1.2)
BUN SERPL-MCNC: 15 MG/DL (ref 8–27)
BUN/CREAT SERPL: 16 (ref 12–28)
CALCIUM SERPL-MCNC: 9.4 MG/DL (ref 8.7–10.3)
CHLORIDE SERPL-SCNC: 95 MMOL/L (ref 96–106)
CHOLEST SERPL-MCNC: 137 MG/DL (ref 100–199)
CK SERPL-CCNC: 67 U/L (ref 32–182)
CO2 SERPL-SCNC: 23 MMOL/L (ref 20–29)
CREAT SERPL-MCNC: 0.94 MG/DL (ref 0.57–1)
EGFRCR SERPLBLD CKD-EPI 2021: 61 ML/MIN/1.73
GLOBULIN SER CALC-MCNC: 1.9 G/DL (ref 1.5–4.5)
GLUCOSE SERPL-MCNC: 87 MG/DL (ref 70–99)
HDLC SERPL-MCNC: 83 MG/DL
IMP & REVIEW OF LAB RESULTS: NORMAL
LDLC SERPL CALC-MCNC: 43 MG/DL (ref 0–99)
POTASSIUM SERPL-SCNC: 4.9 MMOL/L (ref 3.5–5.2)
PROT SERPL-MCNC: 6.2 G/DL (ref 6–8.5)
SODIUM SERPL-SCNC: 130 MMOL/L (ref 134–144)
TRIGL SERPL-MCNC: 50 MG/DL (ref 0–149)
VLDLC SERPL CALC-MCNC: 11 MG/DL (ref 5–40)

## 2023-02-19 NOTE — PROGRESS NOTES
Lipid at goal---K normal, kidney fxn ok. Her sodium level is down at 130--noted she was started on aldactone and lasix back in December. Both can cause low Na level. Recommend to hold lasix for now if leg swelling improved, repeat labwork in 2 weeks. Any symptom such as Nausea and vomiting. Headache. Confusion. Loss of energy, drowsiness and fatigue. Restlessness and irritability. Muscle weakness, spasms or cramps.     NEED to be seen in ED

## 2023-02-20 ENCOUNTER — TELEPHONE (OUTPATIENT)
Dept: CARDIOLOGY CLINIC | Age: 83
End: 2023-02-20

## 2023-02-20 NOTE — TELEPHONE ENCOUNTER
Verified Patient with two identifiers. Spoke with patient regarding results and recommendations. Patient voiced understanding. Patient have not taken any Eliquis since was prescribed back in December. She is not longer having lower leg edema, stated. Please advise.

## 2023-02-20 NOTE — TELEPHONE ENCOUNTER
----- Message from Deidre Becerra NP sent at 2/19/2023  1:28 PM EST -----  Lipid at goal---K normal, kidney fxn ok. Her sodium level is down at 130--noted she was started on aldactone and lasix back in December. Both can cause low Na level. Recommend to hold lasix for now if leg swelling improved, repeat labwork in 2 weeks. Any symptom such as Nausea and vomiting. Headache. Confusion. Loss of energy, drowsiness and fatigue. Restlessness and irritability. Muscle weakness, spasms or cramps.     NEED to be seen in ED

## 2023-02-22 DIAGNOSIS — I25.83 CORONARY ARTERY DISEASE DUE TO LIPID RICH PLAQUE: ICD-10-CM

## 2023-02-22 DIAGNOSIS — I25.10 CORONARY ARTERY DISEASE DUE TO LIPID RICH PLAQUE: ICD-10-CM

## 2023-02-22 DIAGNOSIS — E87.0 SODIUM BLOOD INCREASED: ICD-10-CM

## 2023-02-22 DIAGNOSIS — I25.10 CORONARY ARTERY DISEASE INVOLVING NATIVE CORONARY ARTERY OF NATIVE HEART WITHOUT ANGINA PECTORIS: Primary | ICD-10-CM

## 2023-02-22 RX ORDER — ATORVASTATIN CALCIUM 40 MG/1
40 TABLET, FILM COATED ORAL DAILY
Qty: 90 TABLET | Refills: 1 | Status: SHIPPED | OUTPATIENT
Start: 2023-02-22

## 2023-02-22 NOTE — TELEPHONE ENCOUNTER
Lab slips sent to address on file, previously talked to patient about retaking labs second week of march.

## 2023-02-23 RX ORDER — METOPROLOL SUCCINATE 50 MG/1
TABLET, EXTENDED RELEASE ORAL
Qty: 90 TABLET | Refills: 1 | Status: SHIPPED | OUTPATIENT
Start: 2023-02-23

## 2023-02-23 NOTE — TELEPHONE ENCOUNTER
Refill Request Received for the Following Medication     Requested Prescriptions     Pending Prescriptions Disp Refills    metoprolol succinate (TOPROL-XL) 50 mg XL tablet [Pharmacy Med Name: METOPROLOL SUCC ER 50 MG TAB] 90 Tablet 1     Sig: TAKE 1 TABLET BY MOUTH EVERY DAY AT NIGHT       Last Prescribed:08-    Last Appointment With Me:  9/12/2022     Future Appointments:  Future Appointments   Date Time Provider Kristin Estrada   9/18/2023 10:40 AM MD RACHEAL [place] AMB   12/19/2023 10:00 AM Mitra Wylie MD [facility]

## 2023-03-13 ENCOUNTER — TELEPHONE (OUTPATIENT)
Dept: CARDIOLOGY CLINIC | Age: 83
End: 2023-03-13

## 2023-03-13 NOTE — TELEPHONE ENCOUNTER
----- Message from Debora Ramirez MD sent at 3/11/2023 10:03 AM EST -----  Please advise cholesterol is at goal and labs look good. Try to get 30 minutes of exercise 4 or 5 times a week and follow-up as per last office note.

## 2023-05-12 ENCOUNTER — HOSPITAL ENCOUNTER (OUTPATIENT)
Age: 83
End: 2023-05-12
Payer: MEDICARE

## 2023-05-12 DIAGNOSIS — Z78.0 MENOPAUSE: ICD-10-CM

## 2023-05-12 PROCEDURE — 77080 DXA BONE DENSITY AXIAL: CPT

## 2023-06-06 ENCOUNTER — TELEPHONE (OUTPATIENT)
Age: 83
End: 2023-06-06

## 2023-06-06 RX ORDER — OLMESARTAN MEDOXOMIL 40 MG/1
TABLET ORAL
Qty: 90 TABLET | Refills: 3 | Status: SHIPPED | OUTPATIENT
Start: 2023-06-06

## 2023-06-06 RX ORDER — SPIRONOLACTONE 25 MG/1
TABLET ORAL
Qty: 90 TABLET | Refills: 3 | Status: SHIPPED | OUTPATIENT
Start: 2023-06-06

## 2023-06-06 NOTE — TELEPHONE ENCOUNTER
Pt requested refill    Spironolactone 25 mg  Atorvastatin 40 mg    [facility] [phone]

## 2023-06-06 NOTE — TELEPHONE ENCOUNTER
Requested Prescriptions     Signed Prescriptions Disp Refills    spironolactone (ALDACTONE) 25 MG tablet 90 tablet 3     Sig: TAKE 1 TABLET BY MOUTH EVERY DAY     Authorizing Provider: KASIE SILVA     Ordering User: NARINDER ROWE    olmesartan (BENICAR) 40 MG tablet 90 tablet 3     Sig: TAKE 1 TABLET BY MOUTH EVERY DAY     Authorizing Provider: KASIE SILVA     Ordering User: Cory BURRIS     Refills per verbal order from Dr. Quintin Roger.   Last OV: 12/09/22  Next OV: 12/19/23

## 2023-06-07 RX ORDER — ATORVASTATIN CALCIUM 40 MG/1
40 TABLET, FILM COATED ORAL DAILY
Qty: 90 TABLET | Refills: 1 | Status: SHIPPED | OUTPATIENT
Start: 2023-06-07

## 2023-08-07 RX ORDER — APIXABAN 5 MG/1
TABLET, FILM COATED ORAL
Qty: 180 TABLET | Refills: 0 | Status: SHIPPED | OUTPATIENT
Start: 2023-08-07

## 2023-08-07 NOTE — TELEPHONE ENCOUNTER
Refill Request Received for the Following Medication     Requested Prescriptions     Pending Prescriptions Disp Refills    ELIQUIS 5 MG TABS tablet [Pharmacy Med Name: ELIQUIS 5 MG TABLET] 180 tablet 1     Sig: TAKE 1 TABLET BY MOUTH TWICE A DAY       Last Prescribed: 01-    Last Appointment With Me:  9/12/2022     Future Appointments:  Future Appointments   Date Time Provider [address]   9/18/2023 10:40 AM MD SCOT Rush AMB   12/19/2023 10:00 AM MD SCOT Brian AMB

## 2023-08-21 RX ORDER — METOPROLOL SUCCINATE 50 MG/1
TABLET, EXTENDED RELEASE ORAL
Qty: 90 TABLET | Refills: 0 | Status: SHIPPED | OUTPATIENT
Start: 2023-08-21

## 2023-08-21 NOTE — TELEPHONE ENCOUNTER
Refill Request Received for the Following Medication     Requested Prescriptions     Pending Prescriptions Disp Refills    metoprolol succinate (TOPROL XL) 50 MG extended release tablet [Pharmacy Med Name: METOPROLOL SUCC ER 50 MG TAB] 90 tablet 1     Sig: TAKE 1 TABLET BY MOUTH EVERY DAY AT NIGHT       Last Prescribed: 02-23-23    Last Appointment With Me:  9/12/2022     Future Appointments:  Future Appointments   Date Time Provider [address]   9/18/2023 10:40 AM MD SCOT Guardado AMB   12/19/2023 10:00 AM MD SCOT Silvestre AMB

## 2023-10-02 ENCOUNTER — OFFICE VISIT (OUTPATIENT)
Age: 83
End: 2023-10-02
Payer: MEDICARE

## 2023-10-02 VITALS
HEART RATE: 57 BPM | HEIGHT: 63 IN | OXYGEN SATURATION: 94 % | DIASTOLIC BLOOD PRESSURE: 80 MMHG | SYSTOLIC BLOOD PRESSURE: 120 MMHG | RESPIRATION RATE: 16 BRPM | BODY MASS INDEX: 33.13 KG/M2 | WEIGHT: 187 LBS

## 2023-10-02 DIAGNOSIS — E78.2 MIXED HYPERLIPIDEMIA: ICD-10-CM

## 2023-10-02 DIAGNOSIS — I10 ESSENTIAL (PRIMARY) HYPERTENSION: ICD-10-CM

## 2023-10-02 DIAGNOSIS — R55 POSTURAL DIZZINESS WITH PRESYNCOPE: Primary | ICD-10-CM

## 2023-10-02 DIAGNOSIS — I47.20 VENTRICULAR TACHYCARDIA, UNSPECIFIED (HCC): ICD-10-CM

## 2023-10-02 DIAGNOSIS — R42 POSTURAL DIZZINESS WITH PRESYNCOPE: Primary | ICD-10-CM

## 2023-10-02 DIAGNOSIS — I25.83 CORONARY ATHEROSCLEROSIS DUE TO LIPID RICH PLAQUE (CODE): ICD-10-CM

## 2023-10-02 DIAGNOSIS — Z98.61 S/P PTCA (PERCUTANEOUS TRANSLUMINAL CORONARY ANGIOPLASTY): ICD-10-CM

## 2023-10-02 DIAGNOSIS — I44.7 LEFT BUNDLE-BRANCH BLOCK, UNSPECIFIED: ICD-10-CM

## 2023-10-02 PROCEDURE — 93005 ELECTROCARDIOGRAM TRACING: CPT | Performed by: INTERNAL MEDICINE

## 2023-10-02 PROCEDURE — 1123F ACP DISCUSS/DSCN MKR DOCD: CPT | Performed by: INTERNAL MEDICINE

## 2023-10-02 PROCEDURE — 1090F PRES/ABSN URINE INCON ASSESS: CPT | Performed by: INTERNAL MEDICINE

## 2023-10-02 PROCEDURE — G8427 DOCREV CUR MEDS BY ELIG CLIN: HCPCS | Performed by: INTERNAL MEDICINE

## 2023-10-02 PROCEDURE — 93010 ELECTROCARDIOGRAM REPORT: CPT | Performed by: INTERNAL MEDICINE

## 2023-10-02 PROCEDURE — G8484 FLU IMMUNIZE NO ADMIN: HCPCS | Performed by: INTERNAL MEDICINE

## 2023-10-02 PROCEDURE — 1036F TOBACCO NON-USER: CPT | Performed by: INTERNAL MEDICINE

## 2023-10-02 PROCEDURE — 3079F DIAST BP 80-89 MM HG: CPT | Performed by: INTERNAL MEDICINE

## 2023-10-02 PROCEDURE — 99214 OFFICE O/P EST MOD 30 MIN: CPT | Performed by: INTERNAL MEDICINE

## 2023-10-02 PROCEDURE — 3074F SYST BP LT 130 MM HG: CPT | Performed by: INTERNAL MEDICINE

## 2023-10-02 PROCEDURE — G8399 PT W/DXA RESULTS DOCUMENT: HCPCS | Performed by: INTERNAL MEDICINE

## 2023-10-02 PROCEDURE — G8417 CALC BMI ABV UP PARAM F/U: HCPCS | Performed by: INTERNAL MEDICINE

## 2023-10-02 RX ORDER — VITAMIN B COMPLEX
1 CAPSULE ORAL DAILY
COMMUNITY

## 2023-10-02 RX ORDER — BIOTIN 10 MG
10 TABLET ORAL DAILY
COMMUNITY

## 2023-10-02 RX ORDER — ZINC GLUCONATE 50 MG
50 TABLET ORAL DAILY
COMMUNITY

## 2023-10-02 RX ORDER — CHLORAL HYDRATE 500 MG
CAPSULE ORAL DAILY
COMMUNITY

## 2023-10-02 RX ORDER — MAGNESIUM 200 MG
200 TABLET ORAL DAILY
COMMUNITY

## 2023-11-06 RX ORDER — APIXABAN 5 MG/1
TABLET, FILM COATED ORAL
Qty: 180 TABLET | Refills: 3 | Status: SHIPPED | OUTPATIENT
Start: 2023-11-06

## 2023-11-06 NOTE — TELEPHONE ENCOUNTER
Refill Request Received for the Following Medication     Requested Prescriptions     Pending Prescriptions Disp Refills    ELIQUIS 5 MG TABS tablet [Pharmacy Med Name: ELIQUIS 5 MG TABLET] 180 tablet 3     Sig: TAKE 1 TABLET BY MOUTH TWICE A DAY       Last Prescribed: 08-    Last Appointment With Me:  10/2/2023     Future Appointments:  Future Appointments   Date Time Provider [address]   12/19/2023 10:00 AM Christin Rush MD [facility]   10/7/2024 11:20 AM Dinh Huerta MD [facility]

## 2023-11-08 ENCOUNTER — TELEPHONE (OUTPATIENT)
Age: 83
End: 2023-11-08

## 2023-11-08 NOTE — TELEPHONE ENCOUNTER
Left a message to patient stated that Eliquis was refilled on 11-03-23 180 tabs with 3 refills, patient okay until beginning of November 2024.

## 2023-11-10 RX ORDER — METOPROLOL SUCCINATE 50 MG/1
TABLET, EXTENDED RELEASE ORAL
Qty: 90 TABLET | Refills: 3 | Status: SHIPPED | OUTPATIENT
Start: 2023-11-10

## 2024-01-26 RX ORDER — ATORVASTATIN CALCIUM 40 MG/1
40 TABLET, FILM COATED ORAL DAILY
Qty: 90 TABLET | Refills: 2 | Status: SHIPPED | OUTPATIENT
Start: 2024-01-26

## 2024-01-26 NOTE — TELEPHONE ENCOUNTER
Refill Request Received for the Following Medication     Requested Prescriptions     Pending Prescriptions Disp Refills    atorvastatin (LIPITOR) 40 MG tablet [Pharmacy Med Name: ATORVASTATIN 40 MG TABLET] 90 tablet 1     Sig: TAKE 1 TABLET BY MOUTH EVERY DAY       Last Prescribed: 06-    Last Appointment With Me:  10/2/2023     Future Appointments:  Future Appointments   Date Time Provider Department Center   2/1/2024 11:00 AM Christin Rush MD [facility]   10/7/2024 11:20 AM Brandon Huerta MD [facility]

## 2024-04-11 ENCOUNTER — OFFICE VISIT (OUTPATIENT)
Age: 84
End: 2024-04-11
Payer: MEDICARE

## 2024-04-11 VITALS
OXYGEN SATURATION: 97 % | BODY MASS INDEX: 33.31 KG/M2 | SYSTOLIC BLOOD PRESSURE: 118 MMHG | DIASTOLIC BLOOD PRESSURE: 62 MMHG | HEIGHT: 63 IN | WEIGHT: 188 LBS | HEART RATE: 68 BPM

## 2024-04-11 DIAGNOSIS — I48.0 PAF (PAROXYSMAL ATRIAL FIBRILLATION) (HCC): Primary | ICD-10-CM

## 2024-04-11 DIAGNOSIS — E78.5 HYPERLIPIDEMIA, UNSPECIFIED HYPERLIPIDEMIA TYPE: ICD-10-CM

## 2024-04-11 DIAGNOSIS — I44.7 LEFT BUNDLE-BRANCH BLOCK: ICD-10-CM

## 2024-04-11 DIAGNOSIS — I10 BENIGN ESSENTIAL HYPERTENSION: ICD-10-CM

## 2024-04-11 DIAGNOSIS — R55 POSTURAL DIZZINESS WITH PRESYNCOPE: ICD-10-CM

## 2024-04-11 DIAGNOSIS — Z98.61 S/P PTCA (PERCUTANEOUS TRANSLUMINAL CORONARY ANGIOPLASTY): ICD-10-CM

## 2024-04-11 DIAGNOSIS — I25.10 CORONARY ARTERY DISEASE INVOLVING NATIVE CORONARY ARTERY OF NATIVE HEART WITHOUT ANGINA PECTORIS: ICD-10-CM

## 2024-04-11 DIAGNOSIS — R42 POSTURAL DIZZINESS WITH PRESYNCOPE: ICD-10-CM

## 2024-04-11 PROCEDURE — 1036F TOBACCO NON-USER: CPT | Performed by: INTERNAL MEDICINE

## 2024-04-11 PROCEDURE — 3074F SYST BP LT 130 MM HG: CPT | Performed by: INTERNAL MEDICINE

## 2024-04-11 PROCEDURE — 1090F PRES/ABSN URINE INCON ASSESS: CPT | Performed by: INTERNAL MEDICINE

## 2024-04-11 PROCEDURE — 99214 OFFICE O/P EST MOD 30 MIN: CPT | Performed by: INTERNAL MEDICINE

## 2024-04-11 PROCEDURE — G8417 CALC BMI ABV UP PARAM F/U: HCPCS | Performed by: INTERNAL MEDICINE

## 2024-04-11 PROCEDURE — 93005 ELECTROCARDIOGRAM TRACING: CPT | Performed by: INTERNAL MEDICINE

## 2024-04-11 PROCEDURE — G8427 DOCREV CUR MEDS BY ELIG CLIN: HCPCS | Performed by: INTERNAL MEDICINE

## 2024-04-11 PROCEDURE — 3078F DIAST BP <80 MM HG: CPT | Performed by: INTERNAL MEDICINE

## 2024-04-11 PROCEDURE — 1123F ACP DISCUSS/DSCN MKR DOCD: CPT | Performed by: INTERNAL MEDICINE

## 2024-04-11 PROCEDURE — 93010 ELECTROCARDIOGRAM REPORT: CPT | Performed by: INTERNAL MEDICINE

## 2024-04-11 PROCEDURE — G8399 PT W/DXA RESULTS DOCUMENT: HCPCS | Performed by: INTERNAL MEDICINE

## 2024-04-11 RX ORDER — BUDESONIDE, GLYCOPYRROLATE, AND FORMOTEROL FUMARATE 160; 9; 4.8 UG/1; UG/1; UG/1
AEROSOL, METERED RESPIRATORY (INHALATION)
COMMUNITY

## 2024-04-11 ASSESSMENT — PATIENT HEALTH QUESTIONNAIRE - PHQ9
SUM OF ALL RESPONSES TO PHQ9 QUESTIONS 1 & 2: 0
SUM OF ALL RESPONSES TO PHQ QUESTIONS 1-9: 0
SUM OF ALL RESPONSES TO PHQ QUESTIONS 1-9: 0
1. LITTLE INTEREST OR PLEASURE IN DOING THINGS: NOT AT ALL
2. FEELING DOWN, DEPRESSED OR HOPELESS: NOT AT ALL
SUM OF ALL RESPONSES TO PHQ QUESTIONS 1-9: 0
SUM OF ALL RESPONSES TO PHQ QUESTIONS 1-9: 0

## 2024-04-11 NOTE — PATIENT INSTRUCTIONS
Consider starting Zyrtec/Claritin for allergy season    Obtain 1 week extended HOlter monitor   FU with EP NP in 9 month  FU with Dr. Rush in 18 months

## 2024-04-11 NOTE — PROGRESS NOTES
hours.    Invalid input(s): \"CKQMB\", \"CPKMB\", \"TROIQ\"             ___________________________________________________    An Maico Rush MD, FACC, RS  Cardiac Electrophysiology  [facility] [place]  [address]                         [place] [address] [phone] [address]  [place] [address] [phone]

## 2024-04-12 ENCOUNTER — TELEPHONE (OUTPATIENT)
Age: 84
End: 2024-04-12

## 2024-04-12 NOTE — TELEPHONE ENCOUNTER
Enrolled with [employer] - Ordered and being shipped to patient's home address on file.  ETA within 5-7 Business Days.        Message  Received: Yesterday  Cheikh Gomez, Elisabet Chand; Birgit Davis  PLEASE SEND 7 DAY HOLTER FOR PAF, DIZZINESS PER DR. SILVA.  THANK YOU!!

## 2024-05-08 ENCOUNTER — TELEPHONE (OUTPATIENT)
Age: 84
End: 2024-05-08

## 2024-05-08 NOTE — TELEPHONE ENCOUNTER
Discussed with Dr. Huerta, Agreed with withholding Eliquis.   Appointment made for Elis 10 at 1:20 pm. Patient informed.

## 2024-05-08 NOTE — TELEPHONE ENCOUNTER
This nurse attempted to contact patient unsuccessfully. Secure message left stating that if she is in the hospital at this moment,  Hospital will make the decision to stop any medication as needed.   Anyway, I sent a message to MD explaining situation.
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
## 2024-05-31 RX ORDER — SPIRONOLACTONE 25 MG/1
25 TABLET ORAL DAILY
Qty: 90 TABLET | Refills: 1 | Status: SHIPPED | OUTPATIENT
Start: 2024-05-31

## 2024-05-31 NOTE — TELEPHONE ENCOUNTER
Requested Prescriptions     Signed Prescriptions Disp Refills    spironolactone (ALDACTONE) 25 MG tablet 90 tablet 1     Sig: Take 1 tablet by mouth daily     Authorizing Provider: CHRISTIN RUSH     Ordering User: ANDRZEJ BAUTISTA     Refill per verbal order Dr. Chrisitn Rush.   Last visit:4/11/24  Next visit: 1/10/25

## 2024-06-10 ENCOUNTER — TELEPHONE (OUTPATIENT)
Age: 84
End: 2024-06-10

## 2024-06-10 ENCOUNTER — OFFICE VISIT (OUTPATIENT)
Age: 84
End: 2024-06-10
Payer: MEDICARE

## 2024-06-10 VITALS
HEART RATE: 66 BPM | SYSTOLIC BLOOD PRESSURE: 104 MMHG | DIASTOLIC BLOOD PRESSURE: 62 MMHG | OXYGEN SATURATION: 94 % | BODY MASS INDEX: 32.78 KG/M2 | WEIGHT: 185 LBS | HEIGHT: 63 IN

## 2024-06-10 DIAGNOSIS — R55 POSTURAL DIZZINESS WITH PRESYNCOPE: Primary | ICD-10-CM

## 2024-06-10 DIAGNOSIS — Z98.61 S/P PTCA (PERCUTANEOUS TRANSLUMINAL CORONARY ANGIOPLASTY): ICD-10-CM

## 2024-06-10 DIAGNOSIS — I10 ESSENTIAL (PRIMARY) HYPERTENSION: ICD-10-CM
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
## 2024-06-10 DIAGNOSIS — I44.7 LEFT BUNDLE-BRANCH BLOCK, UNSPECIFIED: ICD-10-CM

## 2024-06-10 DIAGNOSIS — I25.83 CORONARY ATHEROSCLEROSIS DUE TO LIPID RICH PLAQUE (CODE): ICD-10-CM

## 2024-06-10 DIAGNOSIS — R42 POSTURAL DIZZINESS WITH PRESYNCOPE: Primary | ICD-10-CM

## 2024-06-10 DIAGNOSIS — I48.0 PAF (PAROXYSMAL ATRIAL FIBRILLATION) (HCC): ICD-10-CM

## 2024-06-10 DIAGNOSIS — I47.20 VENTRICULAR TACHYCARDIA, UNSPECIFIED (HCC): ICD-10-CM

## 2024-06-10 DIAGNOSIS — E78.2 MIXED HYPERLIPIDEMIA: ICD-10-CM

## 2024-06-10 PROCEDURE — 93010 ELECTROCARDIOGRAM REPORT: CPT | Performed by: INTERNAL MEDICINE

## 2024-06-10 PROCEDURE — 3078F DIAST BP <80 MM HG: CPT | Performed by: INTERNAL MEDICINE

## 2024-06-10 PROCEDURE — G8399 PT W/DXA RESULTS DOCUMENT: HCPCS | Performed by: INTERNAL MEDICINE

## 2024-06-10 PROCEDURE — 1123F ACP DISCUSS/DSCN MKR DOCD: CPT | Performed by: INTERNAL MEDICINE

## 2024-06-10 PROCEDURE — 1036F TOBACCO NON-USER: CPT | Performed by: INTERNAL MEDICINE

## 2024-06-10 PROCEDURE — 99214 OFFICE O/P EST MOD 30 MIN: CPT | Performed by: INTERNAL MEDICINE

## 2024-06-10 PROCEDURE — 93005 ELECTROCARDIOGRAM TRACING: CPT | Performed by: INTERNAL MEDICINE

## 2024-06-10 PROCEDURE — 1090F PRES/ABSN URINE INCON ASSESS: CPT | Performed by: INTERNAL MEDICINE

## 2024-06-10 PROCEDURE — G8427 DOCREV CUR MEDS BY ELIG CLIN: HCPCS | Performed by: INTERNAL MEDICINE

## 2024-06-10 PROCEDURE — G8417 CALC BMI ABV UP PARAM F/U: HCPCS | Performed by: INTERNAL MEDICINE

## 2024-06-10 PROCEDURE — 3074F SYST BP LT 130 MM HG: CPT | Performed by: INTERNAL MEDICINE

## 2024-06-10 ASSESSMENT — PATIENT HEALTH QUESTIONNAIRE - PHQ9
2. FEELING DOWN, DEPRESSED OR HOPELESS: NOT AT ALL
SUM OF ALL RESPONSES TO PHQ9 QUESTIONS 1 & 2: 0
SUM OF ALL RESPONSES TO PHQ QUESTIONS 1-9: 0
1. LITTLE INTEREST OR PLEASURE IN DOING THINGS: NOT AT ALL
SUM OF ALL RESPONSES TO PHQ QUESTIONS 1-9: 0
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
## 2024-06-10 NOTE — PATIENT INSTRUCTIONS
You will received the heart monitor in the mail within 5-7 days, please follow instructions inside the box and call the [phone] in the papers(inside box) if you have any problem with placement.

## 2024-06-10 NOTE — TELEPHONE ENCOUNTER
Enrolled with [employer] - Ordered and being shipped to patient's home address on file.  ETA within 5-7 Business Days.        Message  Received: Today  Eugenie Araujo, Birgit Degroot  Please order a 7 days holter for Afib for above patient. Thanks so much

## 2024-07-08 ENCOUNTER — TELEPHONE (OUTPATIENT)
Age: 84
End: 2024-07-08
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
## 2024-07-08 NOTE — TELEPHONE ENCOUNTER
Patient can resume Eliquis as previously prescribed.  Monitor read by Dr. Rush did not show any atrial fibrillation, but it is always possible that it could come back from time to time.  Monitor closely for any signs of bleeding and if any concerns we will have a low threshold to stop the Eliquis again.

## 2024-07-09 NOTE — TELEPHONE ENCOUNTER
Spoke with patient , verified patient with two identifiers, regarding results and recommendations. Patient voiced understanding.     Message sent to nurse of dr. Rush for Heart monitor report.

## 2024-07-09 NOTE — TELEPHONE ENCOUNTER
Returned patient call, ID verified using two patient identifiers.    Christin Rush MD   to Yue Bianchi \"Yuegrant Bianchi\"       6/30/24  8:59 PM  Your monitor looks great without any evidence of atrial fibrillation or abnormal arrhythmias. Great results overall. I hope you're doing well!    This [employer] message has not been read.    Notified patient of above results.  Number provided to the [employer] help desk so she can get into her account.  Reviewed upcoming scheduled appointments.    Patient verbalized understanding and will call back with any other questions or concerns.    Future Appointments   Date Time Provider Department Center   12/10/2024  2:00 PM Brandon Huerta MD [facility]   1/10/2025 11:20 AM Bethanie Ta APRN - [facility]   10/21/2025 11:00 AM Christin Rush MD [facility]

## 2024-07-12 ENCOUNTER — CLINICAL DOCUMENTATION (OUTPATIENT)
Age: 84
End: 2024-07-12
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
## 2024-08-19 RX ORDER — METOPROLOL SUCCINATE 50 MG/1
TABLET, EXTENDED RELEASE ORAL
Qty: 90 TABLET | Refills: 3 | Status: SHIPPED | OUTPATIENT
Start: 2024-08-19

## 2024-08-19 NOTE — TELEPHONE ENCOUNTER
Refill Request Received for the Following Medication     Requested Prescriptions     Pending Prescriptions Disp Refills    metoprolol succinate (TOPROL XL) 50 MG extended release tablet [Pharmacy Med Name: METOPROLOL SUCC ER 50 MG TAB] 90 tablet 3     Sig: TAKE 1 TABLET BY MOUTH EVERY DAY AT NIGHT       Last Prescribed: 11-    Last Appointment With Me:  6/10/2024     Future Appointments:  Future Appointments   Date Time Provider Department Center   12/10/2024  2:00 PM Brandon Huerta MD [facility]   1/10/2025 11:20 AM Bethanie Ta, APRN - NP [facility]   10/21/2025 11:00 AM Christin Rush MD [facility]

## 2024-08-28 RX ORDER — OLMESARTAN MEDOXOMIL 40 MG/1
TABLET ORAL
Qty: 90 TABLET | Refills: 1 | Status: SHIPPED | OUTPATIENT
Start: 2024-08-28

## 2024-08-28 NOTE — TELEPHONE ENCOUNTER
Retrieved [facility] blood work from 06-  Hemoglobin    11.2  Creatinine       0.86  Sodium           136  Potassium       4.3.  Per dr. Huerta on last office visit on 06-10-24  \"- Given her hypertension, we will continue olmesartan, metoprolol succinate \"

## 2024-10-15 ENCOUNTER — OFFICE VISIT (OUTPATIENT)
Age: 84
End: 2024-10-15
Payer: MEDICARE

## 2024-10-15 VITALS
DIASTOLIC BLOOD PRESSURE: 80 MMHG | HEIGHT: 63 IN | HEART RATE: 56 BPM | BODY MASS INDEX: 32.46 KG/M2 | WEIGHT: 183.2 LBS | SYSTOLIC BLOOD PRESSURE: 140 MMHG | OXYGEN SATURATION: 95 %

## 2024-10-15 DIAGNOSIS — Z98.61 S/P PTCA (PERCUTANEOUS TRANSLUMINAL CORONARY ANGIOPLASTY): ICD-10-CM

## 2024-10-15 DIAGNOSIS — I10 BENIGN ESSENTIAL HYPERTENSION: ICD-10-CM

## 2024-10-15 DIAGNOSIS — I48.0 PAF (PAROXYSMAL ATRIAL FIBRILLATION) (HCC): ICD-10-CM

## 2024-10-15 DIAGNOSIS — I20.9 ANGINA, CLASS III (HCC): Primary | ICD-10-CM

## 2024-10-15 DIAGNOSIS — R06.09 DOE (DYSPNEA ON EXERTION): ICD-10-CM

## 2024-10-15 DIAGNOSIS — I20.9 ANGINA, CLASS III (HCC): ICD-10-CM

## 2024-10-15 DIAGNOSIS — I25.10 CORONARY ARTERY DISEASE INVOLVING NATIVE CORONARY ARTERY OF NATIVE HEART WITHOUT ANGINA PECTORIS: ICD-10-CM

## 2024-10-15 DIAGNOSIS — E78.5 HYPERLIPIDEMIA, UNSPECIFIED HYPERLIPIDEMIA TYPE: ICD-10-CM

## 2024-10-15 DIAGNOSIS — I44.7 LEFT BUNDLE-BRANCH BLOCK: ICD-10-CM

## 2024-10-15 PROCEDURE — 99214 OFFICE O/P EST MOD 30 MIN: CPT | Performed by: INTERNAL MEDICINE

## 2024-10-15 PROCEDURE — G8427 DOCREV CUR MEDS BY ELIG CLIN: HCPCS | Performed by: INTERNAL MEDICINE

## 2024-10-15 PROCEDURE — 1123F ACP DISCUSS/DSCN MKR DOCD: CPT | Performed by: INTERNAL MEDICINE

## 2024-10-15 PROCEDURE — 1090F PRES/ABSN URINE INCON ASSESS: CPT | Performed by: INTERNAL MEDICINE

## 2024-10-15 PROCEDURE — 3078F DIAST BP <80 MM HG: CPT | Performed by: INTERNAL MEDICINE

## 2024-10-15 PROCEDURE — 3075F SYST BP GE 130 - 139MM HG: CPT | Performed by: INTERNAL MEDICINE

## 2024-10-15 PROCEDURE — G8484 FLU IMMUNIZE NO ADMIN: HCPCS | Performed by: INTERNAL MEDICINE

## 2024-10-15 PROCEDURE — 93005 ELECTROCARDIOGRAM TRACING: CPT | Performed by: INTERNAL MEDICINE

## 2024-10-15 PROCEDURE — 1036F TOBACCO NON-USER: CPT | Performed by: INTERNAL MEDICINE

## 2024-10-15 PROCEDURE — 93010 ELECTROCARDIOGRAM REPORT: CPT | Performed by: INTERNAL MEDICINE

## 2024-10-15 PROCEDURE — G8399 PT W/DXA RESULTS DOCUMENT: HCPCS | Performed by: INTERNAL MEDICINE

## 2024-10-15 PROCEDURE — G8417 CALC BMI ABV UP PARAM F/U: HCPCS | Performed by: INTERNAL MEDICINE

## 2024-10-15 RX ORDER — NITROGLYCERIN 0.4 MG/1
0.4 TABLET SUBLINGUAL EVERY 5 MIN PRN
Qty: 25 TABLET | Refills: 0 | Status: SHIPPED | OUTPATIENT
Start: 2024-10-15

## 2024-10-15 RX ORDER — BUDESONIDE AND FORMOTEROL FUMARATE DIHYDRATE 80; 4.5 UG/1; UG/1
2 AEROSOL RESPIRATORY (INHALATION) 2 TIMES DAILY
COMMUNITY

## 2024-10-15 ASSESSMENT — PATIENT HEALTH QUESTIONNAIRE - PHQ9
SUM OF ALL RESPONSES TO PHQ QUESTIONS 1-9: 0
SUM OF ALL RESPONSES TO PHQ QUESTIONS 1-9: 0
2. FEELING DOWN, DEPRESSED OR HOPELESS: NOT AT ALL
1. LITTLE INTEREST OR PLEASURE IN DOING THINGS: NOT AT ALL
SUM OF ALL RESPONSES TO PHQ9 QUESTIONS 1 & 2: 0
SUM OF ALL RESPONSES TO PHQ QUESTIONS 1-9: 0
SUM OF ALL RESPONSES TO PHQ QUESTIONS 1-9: 0
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
## 2024-10-15 NOTE — PATIENT INSTRUCTIONS
You will be scheduled for a Nuclear Stress Test after your appointment today.    Nuclear stress testing evaluates blood flow to your heart muscle and assesses cardiac function.  this procedure and will include an IV administration of a stressing medication called Lexiscan.   *Please arrive 15 minutes prior to your appointment time    Test Duration:    -One day testing will take 4 hours     Day of testing instructions:    NO CAFFEINE (not even decaffeinated products) 24 HOURS PRIOR TO TESTING. This includes coffee, soda, tea, chocolate, multivitamins, and migraine medication, like Excedrin or Fioricet that contains caffeine.  Nothing to eat or drink 4 HOURS prior to testing  NO NICOTINE 12 hours prior to testing  Wear comfortable clothes and shoes (Shirts with no metal, shorts or pants, tennis shoes, no heels or flip flops)    IMPORTANT: This testing involves a cardiac tracer ordered specifically for you. If you are unable to make your appointment, please call to cancel/reschedule AT LEAST 24 hours prior to your appointment so your tracer can be cancelled. [phone].

## 2024-10-16 LAB
ANION GAP SERPL CALC-SCNC: 7 MMOL/L (ref 2–12)
BUN SERPL-MCNC: 26 MG/DL (ref 6–20)
BUN/CREAT SERPL: 25 (ref 12–20)
CALCIUM SERPL-MCNC: 9.9 MG/DL (ref 8.5–10.1)
CHLORIDE SERPL-SCNC: 100 MMOL/L (ref 97–108)
CO2 SERPL-SCNC: 25 MMOL/L (ref 21–32)
CREAT SERPL-MCNC: 1.03 MG/DL (ref 0.55–1.02)
ERYTHROCYTE [DISTWIDTH] IN BLOOD BY AUTOMATED COUNT: 12.6 % (ref 11.5–14.5)
GLUCOSE SERPL-MCNC: 96 MG/DL (ref 65–100)
HCT VFR BLD AUTO: 37.1 % (ref 35–47)
HGB BLD-MCNC: 12.3 G/DL (ref 11.5–16)
MCH RBC QN AUTO: 32.8 PG (ref 26–34)
MCHC RBC AUTO-ENTMCNC: 33.2 G/DL (ref 30–36.5)
MCV RBC AUTO: 98.9 FL (ref 80–99)
NRBC # BLD: 0 K/UL (ref 0–0.01)
NRBC BLD-RTO: 0 PER 100 WBC
PLATELET # BLD AUTO: 224 K/UL (ref 150–400)
PMV BLD AUTO: 10.7 FL (ref 8.9–12.9)
POTASSIUM SERPL-SCNC: 4.6 MMOL/L (ref 3.5–5.1)
RBC # BLD AUTO: 3.75 M/UL (ref 3.8–5.2)
SODIUM SERPL-SCNC: 132 MMOL/L (ref 136–145)
WBC # BLD AUTO: 5.8 K/UL (ref 3.6–11)

## 2024-10-24 ENCOUNTER — TELEPHONE (OUTPATIENT)
Age: 84
End: 2024-10-24

## 2024-10-24 NOTE — TELEPHONE ENCOUNTER
Spoke with patient, verified with 2 identifiers. Ms Foley is concerned about her sodium levels being low again.   This nurse explained that is slightly low, not dangerous low at this time.

## 2024-10-24 NOTE — TELEPHONE ENCOUNTER
Patient is calling because she would like her lab results.Patient said she is having some problems she thinks she may have gout in her toe.[facility]    [phone]

## 2024-10-25 NOTE — TELEPHONE ENCOUNTER
Agree her sodium level is not dangerously low, eat 3 balanced meals, drink adequate fluid but not excessive water.  With regard to concerns of gout, discuss with PCP.

## 2024-10-25 NOTE — TELEPHONE ENCOUNTER
Spoke with patient , verified patient with two identifiers, regarding results and recommendations. Patient voiced understanding.

## 2024-10-28 ENCOUNTER — TELEPHONE (OUTPATIENT)
Age: 84
End: 2024-10-28

## 2024-10-28 NOTE — TELEPHONE ENCOUNTER
Other labs look good as well.  Kidney function stable, hemoglobin improved to 12.3.  Continue same dose of Eliquis.

## 2024-10-31 ENCOUNTER — TELEPHONE (OUTPATIENT)
Age: 84
End: 2024-10-31

## 2024-10-31 NOTE — TELEPHONE ENCOUNTER
Called and left voice message for patient to call back to have appointment that was scheduled for 11/5/2024 for a stress test rescheduled due to the worldwide shortage.

## 2024-11-05 ENCOUNTER — ANCILLARY PROCEDURE (OUTPATIENT)
Age: 84
End: 2024-11-05
Payer: MEDICARE

## 2024-11-05 VITALS
BODY MASS INDEX: 32.43 KG/M2 | DIASTOLIC BLOOD PRESSURE: 80 MMHG | HEART RATE: 54 BPM | HEIGHT: 63 IN | SYSTOLIC BLOOD PRESSURE: 170 MMHG | WEIGHT: 183 LBS

## 2024-11-05 DIAGNOSIS — I25.10 CORONARY ARTERY DISEASE INVOLVING NATIVE CORONARY ARTERY OF NATIVE HEART WITHOUT ANGINA PECTORIS: ICD-10-CM

## 2024-11-05 DIAGNOSIS — I44.7 LEFT BUNDLE-BRANCH BLOCK: ICD-10-CM

## 2024-11-05 DIAGNOSIS — I48.0 PAF (PAROXYSMAL ATRIAL FIBRILLATION) (HCC): ICD-10-CM

## 2024-11-05 DIAGNOSIS — E78.5 HYPERLIPIDEMIA, UNSPECIFIED HYPERLIPIDEMIA TYPE: ICD-10-CM

## 2024-11-05 DIAGNOSIS — I10 BENIGN ESSENTIAL HYPERTENSION: ICD-10-CM

## 2024-11-05 DIAGNOSIS — I20.9 ANGINA, CLASS III (HCC): ICD-10-CM

## 2024-11-05 DIAGNOSIS — Z98.61 S/P PTCA (PERCUTANEOUS TRANSLUMINAL CORONARY ANGIOPLASTY): ICD-10-CM

## 2024-11-05 DIAGNOSIS — R06.09 DOE (DYSPNEA ON EXERTION): ICD-10-CM

## 2024-11-05 PROCEDURE — A9500 TC99M SESTAMIBI: HCPCS | Performed by: INTERNAL MEDICINE

## 2024-11-05 PROCEDURE — 78452 HT MUSCLE IMAGE SPECT MULT: CPT | Performed by: INTERNAL MEDICINE

## 2024-11-05 RX ORDER — TETRAKIS(2-METHOXYISOBUTYLISOCYANIDE)COPPER(I) TETRAFLUOROBORATE 1 MG/ML
23.8 INJECTION, POWDER, LYOPHILIZED, FOR SOLUTION INTRAVENOUS
Status: COMPLETED | OUTPATIENT
Start: 2024-11-05 | End: 2024-11-05

## 2024-11-05 RX ORDER — REGADENOSON 0.08 MG/ML
0.4 INJECTION, SOLUTION INTRAVENOUS
Status: COMPLETED | OUTPATIENT
Start: 2024-11-05 | End: 2024-11-05

## 2024-11-05 RX ORDER — TETRAKIS(2-METHOXYISOBUTYLISOCYANIDE)COPPER(I) TETRAFLUOROBORATE 1 MG/ML
8.4 INJECTION, POWDER, LYOPHILIZED, FOR SOLUTION INTRAVENOUS
Status: COMPLETED | OUTPATIENT
Start: 2024-11-05 | End: 2024-11-05

## 2024-11-05 RX ADMIN — TECHNETIUM TC 99M SESTAMIBI 23.8 MILLICURIE: 1 INJECTION, POWDER, FOR SOLUTION INTRAVENOUS at 11:30

## 2024-11-05 RX ADMIN — REGADENOSON 0.4 MG: 0.08 INJECTION, SOLUTION INTRAVENOUS at 11:25

## 2024-11-05 RX ADMIN — TECHNETIUM TC 99M SESTAMIBI 8.4 MILLICURIE: 1 INJECTION, POWDER, FOR SOLUTION INTRAVENOUS at 10:10

## 2024-11-06 LAB
ECHO BSA: 1.92 M2
NUC STRESS EJECTION FRACTION: 73 %
STRESS BASELINE DIAS BP: 80 MMHG
STRESS BASELINE HR: 55 BPM
STRESS BASELINE SYS BP: 170 MMHG
STRESS ESTIMATED WORKLOAD: 1 METS
STRESS O2 SAT PEAK: 98 %
STRESS O2 SAT REST: 95 %
STRESS PEAK DIAS BP: 90 MMHG
STRESS PEAK SYS BP: 160 MMHG
STRESS PERCENT HR ACHIEVED: 42 %
STRESS POST PEAK HR: 58 BPM
STRESS RATE PRESSURE PRODUCT: 9280 BPM*MMHG
STRESS TARGET HR: 138 BPM
TID: 0.96

## 2024-11-11 ENCOUNTER — TELEPHONE (OUTPATIENT)
Age: 84
End: 2024-11-11

## 2024-11-11 NOTE — TELEPHONE ENCOUNTER
----- Message from Dr. Brandon Huerta MD sent at 11/10/2024  8:07 PM EST -----  Please advise stress test shows no evidence of active blood flow problems to the arteries of the heart.  Stress test is right most the time, but it can miss things occasionally so if she has recurrent chest pain or shortness of breath that is concerning let us know.      Follow-up as scheduled.     Future Appointments  11/26/2024 3:00 PM    [facility] 3        [facility]  1/10/2025  11:20 AM   Bethanie Ta, APRN# [facility]  4/22/2025  9:40 AM    Brandon Huerta MD [facility]  10/21/2025 11:00 AM   Christin Rush MD [facility]     My chart message sent to patient.

## 2024-11-22 RX ORDER — ATORVASTATIN CALCIUM 40 MG/1
40 TABLET, FILM COATED ORAL DAILY
Qty: 90 TABLET | Refills: 3 | Status: SHIPPED | OUTPATIENT
Start: 2024-11-22

## 2024-11-22 NOTE — TELEPHONE ENCOUNTER
Refill Request Received for the Following Medication     Requested Prescriptions     Pending Prescriptions Disp Refills    atorvastatin (LIPITOR) 40 MG tablet [Pharmacy Med Name: ATORVASTATIN 40 MG TABLET] 90 tablet 2     Sig: TAKE 1 TABLET BY MOUTH EVERY DAY       Last Prescribed:01-    Last Appointment With Me:  10/15/2024     Future Appointments:  Future Appointments   Date Time Provider Department Center   11/26/2024  3:00 PM BS [facility] ECHO 3 [facility]   1/10/2025 11:20 AM Bethanie Ta, APRN - NP [facility]   4/22/2025  9:40 AM Brandon Huerta MD [facility]   10/21/2025 11:00 AM Christin Rush MD [facility]

## 2024-11-26 ENCOUNTER — ANCILLARY PROCEDURE (OUTPATIENT)
Age: 84
End: 2024-11-26
Payer: MEDICARE

## 2024-11-26 VITALS
BODY MASS INDEX: 32.43 KG/M2 | SYSTOLIC BLOOD PRESSURE: 144 MMHG | HEIGHT: 63 IN | WEIGHT: 183 LBS | DIASTOLIC BLOOD PRESSURE: 80 MMHG

## 2024-11-26 DIAGNOSIS — I20.9 ANGINA, CLASS III (HCC): ICD-10-CM

## 2024-11-26 DIAGNOSIS — I10 BENIGN ESSENTIAL HYPERTENSION: ICD-10-CM

## 2024-11-26 DIAGNOSIS — R06.09 DOE (DYSPNEA ON EXERTION): ICD-10-CM

## 2024-11-26 DIAGNOSIS — Z98.61 S/P PTCA (PERCUTANEOUS TRANSLUMINAL CORONARY ANGIOPLASTY): ICD-10-CM

## 2024-11-26 DIAGNOSIS — I48.0 PAF (PAROXYSMAL ATRIAL FIBRILLATION) (HCC): ICD-10-CM

## 2024-11-26 DIAGNOSIS — I25.10 CORONARY ARTERY DISEASE INVOLVING NATIVE CORONARY ARTERY OF NATIVE HEART WITHOUT ANGINA PECTORIS: ICD-10-CM

## 2024-11-26 DIAGNOSIS — E78.5 HYPERLIPIDEMIA, UNSPECIFIED HYPERLIPIDEMIA TYPE: ICD-10-CM

## 2024-11-26 DIAGNOSIS — I44.7 LEFT BUNDLE-BRANCH BLOCK: ICD-10-CM

## 2024-11-26 PROCEDURE — 93306 TTE W/DOPPLER COMPLETE: CPT | Performed by: INTERNAL MEDICINE

## 2024-11-27 LAB
ECHO AO ASC DIAM: 2.9 CM
ECHO AO ASCENDING AORTA INDEX: 1.56 CM/M2
ECHO AO ROOT DIAM: 2.7 CM
ECHO AO ROOT INDEX: 1.45 CM/M2
ECHO AV MEAN GRADIENT: 3 MMHG
ECHO AV MEAN VELOCITY: 0.9 M/S
ECHO AV PEAK GRADIENT: 6 MMHG
ECHO AV PEAK VELOCITY: 1.3 M/S
ECHO AV VELOCITY RATIO: 0.77
ECHO AV VTI: 28.6 CM
ECHO BSA: 1.92 M2
ECHO EST RA PRESSURE: 3 MMHG
ECHO LA DIAMETER INDEX: 1.77 CM/M2
ECHO LA DIAMETER: 3.3 CM
ECHO LA TO AORTIC ROOT RATIO: 1.22
ECHO LA VOL A-L A2C: 69 ML (ref 22–52)
ECHO LA VOL A-L A4C: 80 ML (ref 22–52)
ECHO LA VOL BP: 71 ML (ref 22–52)
ECHO LA VOL MOD A2C: 66 ML (ref 22–52)
ECHO LA VOL MOD A4C: 74 ML (ref 22–52)
ECHO LA VOL/BSA BIPLANE: 38 ML/M2 (ref 16–34)
ECHO LA VOLUME AREA LENGTH: 77 ML
ECHO LA VOLUME INDEX A-L A2C: 37 ML/M2 (ref 16–34)
ECHO LA VOLUME INDEX A-L A4C: 43 ML/M2 (ref 16–34)
ECHO LA VOLUME INDEX AREA LENGTH: 41 ML/M2 (ref 16–34)
ECHO LA VOLUME INDEX MOD A2C: 35 ML/M2 (ref 16–34)
ECHO LA VOLUME INDEX MOD A4C: 40 ML/M2 (ref 16–34)
ECHO LV E' LATERAL VELOCITY: 8.46 CM/S
ECHO LV E' SEPTAL VELOCITY: 7.74 CM/S
ECHO LV EDV A2C: 62 ML
ECHO LV EDV A4C: 86 ML
ECHO LV EDV BP: 74 ML (ref 56–104)
ECHO LV EDV INDEX A4C: 46 ML/M2
ECHO LV EDV INDEX BP: 40 ML/M2
ECHO LV EDV NDEX A2C: 33 ML/M2
ECHO LV EJECTION FRACTION A2C: 79 %
ECHO LV EJECTION FRACTION A4C: 59 %
ECHO LV EJECTION FRACTION BIPLANE: 69 % (ref 55–100)
ECHO LV ESV A2C: 13 ML
ECHO LV ESV A4C: 35 ML
ECHO LV ESV BP: 23 ML (ref 19–49)
ECHO LV ESV INDEX A2C: 7 ML/M2
ECHO LV ESV INDEX A4C: 19 ML/M2
ECHO LV ESV INDEX BP: 12 ML/M2
ECHO LV FRACTIONAL SHORTENING: 26 % (ref 28–44)
ECHO LV INTERNAL DIMENSION DIASTOLE INDEX: 1.88 CM/M2
ECHO LV INTERNAL DIMENSION DIASTOLIC: 3.5 CM (ref 3.9–5.3)
ECHO LV INTERNAL DIMENSION SYSTOLIC INDEX: 1.4 CM/M2
ECHO LV INTERNAL DIMENSION SYSTOLIC: 2.6 CM
ECHO LV IVSD: 1.4 CM (ref 0.6–0.9)
ECHO LV MASS 2D: 144.6 G (ref 67–162)
ECHO LV MASS INDEX 2D: 77.8 G/M2 (ref 43–95)
ECHO LV POSTERIOR WALL DIASTOLIC: 1.1 CM (ref 0.6–0.9)
ECHO LV RELATIVE WALL THICKNESS RATIO: 0.63
ECHO LVOT AV VTI INDEX: 0.9
ECHO LVOT MEAN GRADIENT: 2 MMHG
ECHO LVOT PEAK GRADIENT: 4 MMHG
ECHO LVOT PEAK VELOCITY: 1 M/S
ECHO LVOT VTI: 25.8 CM
ECHO MV A VELOCITY: 0.79 M/S
ECHO MV AREA PHT: 3.5 CM2
ECHO MV E DECELERATION TIME (DT): 217.2 MS
ECHO MV E VELOCITY: 0.73 M/S
ECHO MV E/A RATIO: 0.92
ECHO MV E/E' LATERAL: 8.63
ECHO MV E/E' RATIO (AVERAGED): 9.03
ECHO MV E/E' SEPTAL: 9.43
ECHO MV PRESSURE HALF TIME (PHT): 63 MS
ECHO RA AREA 4C: 20 CM2
ECHO RA END SYSTOLIC VOLUME APICAL 4 CHAMBER INDEX BSA: 31 ML/M2
ECHO RA VOLUME AREA LENGTH APICAL 4 CHAMBER: 61 ML
ECHO RA VOLUME: 58 ML
ECHO RIGHT VENTRICULAR SYSTOLIC PRESSURE (RVSP): 39 MMHG
ECHO RV FREE WALL PEAK S': 8.3 CM/S
ECHO RV INTERNAL DIMENSION: 3.5 CM
ECHO RV TAPSE: 2.4 CM (ref 1.7–?)
ECHO TV REGURGITANT MAX VELOCITY: 3 M/S
ECHO TV REGURGITANT PEAK GRADIENT: 36 MMHG

## 2024-12-03 RX ORDER — SPIRONOLACTONE 25 MG/1
25 TABLET ORAL DAILY
Qty: 90 TABLET | Refills: 1 | Status: SHIPPED | OUTPATIENT
Start: 2024-12-03

## 2024-12-03 NOTE — TELEPHONE ENCOUNTER
VO per MD    Future Appointments   Date Time Provider Department Center   1/10/2025 11:20 AM Bethanie Ta, JANICE - [facility] AMB   4/22/2025  9:40 AM Brandon Huerta MD [facility]   10/21/2025 11:00 AM Christin Rush MD [facility]

## 2024-12-30 RX ORDER — APIXABAN 5 MG/1
TABLET, FILM COATED ORAL
Qty: 180 TABLET | Refills: 2 | Status: SHIPPED | OUTPATIENT
Start: 2024-12-30

## 2024-12-30 NOTE — TELEPHONE ENCOUNTER
Refill Request Received for the Following Medication     Requested Prescriptions     Pending Prescriptions Disp Refills    ELIQUIS 5 MG TABS tablet [Pharmacy Med Name: ELIQUIS 5 MG TABLET] 180 tablet 3     Sig: TAKE 1 TABLET BY MOUTH TWICE A DAY       Last Prescribed: 11-    Last Appointment With Me:  10/15/2024     Future Appointments:  Future Appointments   Date Time Provider Department Center   1/10/2025 11:20 AM Bethanie Ta APRN - [facility]   4/22/2025  9:40 AM Brandon Huerta MD [facility]   10/21/2025 11:00 AM Christin Rush MD [facility]

## 2025-01-04 NOTE — PROGRESS NOTES
Trochlear groove is not  hypoplastic. TT-TG distance: 10 mm    Articular cartilage: There is mild thinning of the cartilage in the  weightbearing medial femoral condyle and in the medial corner medial tibial  plateau.    Soft tissue mass: None.    Impression  1. High-grade complex tearing of the junction of the body and posterior horn of  the medial meniscus with partial meniscal extrusion.  2. Mild edema along the course of the MCL likely related to the above.  3. Moderate knee joint effusion.  4. Mild medial compartment osteoarthritis.          Current meds:  Current Outpatient Medications   Medication Sig Dispense Refill    apixaban (ELIQUIS) 5 MG TABS tablet TAKE 1 TABLET BY MOUTH TWICE A  tablet 2    spironolactone (ALDACTONE) 25 MG tablet TAKE 1 TABLET BY MOUTH EVERY DAY 90 tablet 1    atorvastatin (LIPITOR) 40 MG tablet TAKE 1 TABLET BY MOUTH EVERY DAY 90 tablet 3    budesonide-formoterol (SYMBICORT) 80-4.5 MCG/ACT AERO Inhale 2 puffs into the lungs 2 times daily      nitroGLYCERIN (NITROSTAT) 0.4 MG SL tablet Place 1 tablet under the tongue every 5 minutes as needed for Chest pain up to max of 3 total doses. If no relief after 1 dose, call 911. 25 tablet 0    olmesartan (BENICAR) 40 MG tablet TAKE 1 TABLET BY MOUTH EVERY DAY 90 tablet 1    metoprolol succinate (TOPROL XL) 50 MG extended release tablet TAKE 1 TABLET BY MOUTH EVERY DAY AT NIGHT 90 tablet 3    b complex vitamins capsule Take 1 capsule by mouth daily      Cholecalciferol (VITAMIN D) 10 MCG (400 UNIT) CAPS Capsule Take 1 capsule by mouth daily      zinc 50 MG TABS tablet Take 1 tablet by mouth daily      albuterol sulfate HFA (PROVENTIL;VENTOLIN;PROAIR) 108 (90 Base) MCG/ACT inhaler INHALE 2 PUFFS BY MOUTH EVERY 4 HOURS AS NEEDED      calcium carbonate (OYSTER SHELL CALCIUM 500 MG) 1250 (500 Ca) MG tablet Take by mouth daily      furosemide (LASIX) 20 MG tablet Take by mouth daily as needed      gabapentin (NEURONTIN) 100 MG capsule Take

## 2025-01-10 ENCOUNTER — OFFICE VISIT (OUTPATIENT)
Age: 85
End: 2025-01-10
Payer: MEDICARE

## 2025-01-10 VITALS
OXYGEN SATURATION: 97 % | DIASTOLIC BLOOD PRESSURE: 68 MMHG | SYSTOLIC BLOOD PRESSURE: 120 MMHG | BODY MASS INDEX: 32.74 KG/M2 | HEIGHT: 63 IN | WEIGHT: 184.8 LBS | HEART RATE: 60 BPM

## 2025-01-10 DIAGNOSIS — I48.0 PAF (PAROXYSMAL ATRIAL FIBRILLATION) (HCC): Primary | ICD-10-CM

## 2025-01-10 DIAGNOSIS — I25.10 CORONARY ARTERY DISEASE INVOLVING NATIVE CORONARY ARTERY OF NATIVE HEART WITHOUT ANGINA PECTORIS: ICD-10-CM

## 2025-01-10 DIAGNOSIS — I10 PRIMARY HYPERTENSION: ICD-10-CM

## 2025-01-10 DIAGNOSIS — R55 NEAR SYNCOPE: ICD-10-CM

## 2025-01-10 DIAGNOSIS — Z79.01 ANTICOAGULATED: ICD-10-CM

## 2025-01-10 PROCEDURE — 3078F DIAST BP <80 MM HG: CPT | Performed by: NURSE PRACTITIONER

## 2025-01-10 PROCEDURE — 1126F AMNT PAIN NOTED NONE PRSNT: CPT | Performed by: NURSE PRACTITIONER

## 2025-01-10 PROCEDURE — 3074F SYST BP LT 130 MM HG: CPT | Performed by: NURSE PRACTITIONER

## 2025-01-10 PROCEDURE — G8417 CALC BMI ABV UP PARAM F/U: HCPCS | Performed by: NURSE PRACTITIONER

## 2025-01-10 PROCEDURE — 1090F PRES/ABSN URINE INCON ASSESS: CPT | Performed by: NURSE PRACTITIONER

## 2025-01-10 PROCEDURE — 1123F ACP DISCUSS/DSCN MKR DOCD: CPT | Performed by: NURSE PRACTITIONER

## 2025-01-10 PROCEDURE — G8399 PT W/DXA RESULTS DOCUMENT: HCPCS | Performed by: NURSE PRACTITIONER

## 2025-01-10 PROCEDURE — 1036F TOBACCO NON-USER: CPT | Performed by: NURSE PRACTITIONER

## 2025-01-10 PROCEDURE — 99214 OFFICE O/P EST MOD 30 MIN: CPT | Performed by: NURSE PRACTITIONER

## 2025-01-10 PROCEDURE — G8427 DOCREV CUR MEDS BY ELIG CLIN: HCPCS | Performed by: NURSE PRACTITIONER

## 2025-01-10 PROCEDURE — 1159F MED LIST DOCD IN RCRD: CPT | Performed by: NURSE PRACTITIONER

## 2025-01-10 ASSESSMENT — PATIENT HEALTH QUESTIONNAIRE - PHQ9
1. LITTLE INTEREST OR PLEASURE IN DOING THINGS: NOT AT ALL
SUM OF ALL RESPONSES TO PHQ QUESTIONS 1-9: 0
SUM OF ALL RESPONSES TO PHQ9 QUESTIONS 1 & 2: 0
2. FEELING DOWN, DEPRESSED OR HOPELESS: NOT AT ALL

## 2025-04-22 ENCOUNTER — OFFICE VISIT (OUTPATIENT)
Age: 85
End: 2025-04-22
Payer: MEDICARE

## 2025-04-22 VITALS
HEART RATE: 53 BPM | SYSTOLIC BLOOD PRESSURE: 120 MMHG | OXYGEN SATURATION: 98 % | WEIGHT: 183 LBS | RESPIRATION RATE: 16 BRPM | DIASTOLIC BLOOD PRESSURE: 70 MMHG | BODY MASS INDEX: 32.43 KG/M2 | HEIGHT: 63 IN

## 2025-04-22 DIAGNOSIS — R55 POSTURAL DIZZINESS WITH PRESYNCOPE: ICD-10-CM

## 2025-04-22 DIAGNOSIS — I48.0 PAF (PAROXYSMAL ATRIAL FIBRILLATION) (HCC): Primary | ICD-10-CM

## 2025-04-22 DIAGNOSIS — Z79.01 ANTICOAGULATED: ICD-10-CM

## 2025-04-22 DIAGNOSIS — I10 BENIGN ESSENTIAL HYPERTENSION: ICD-10-CM

## 2025-04-22 DIAGNOSIS — I25.10 CORONARY ARTERY DISEASE INVOLVING NATIVE CORONARY ARTERY OF NATIVE HEART WITHOUT ANGINA PECTORIS: ICD-10-CM

## 2025-04-22 DIAGNOSIS — I10 PRIMARY HYPERTENSION: ICD-10-CM

## 2025-04-22 DIAGNOSIS — I44.7 LEFT BUNDLE-BRANCH BLOCK: ICD-10-CM

## 2025-04-22 DIAGNOSIS — R42 POSTURAL DIZZINESS WITH PRESYNCOPE: ICD-10-CM

## 2025-04-22 DIAGNOSIS — E78.5 HYPERLIPIDEMIA, UNSPECIFIED HYPERLIPIDEMIA TYPE: ICD-10-CM

## 2025-04-22 PROCEDURE — G8417 CALC BMI ABV UP PARAM F/U: HCPCS | Performed by: INTERNAL MEDICINE

## 2025-04-22 PROCEDURE — 99214 OFFICE O/P EST MOD 30 MIN: CPT | Performed by: INTERNAL MEDICINE

## 2025-04-22 PROCEDURE — 3074F SYST BP LT 130 MM HG: CPT | Performed by: INTERNAL MEDICINE

## 2025-04-22 PROCEDURE — 3078F DIAST BP <80 MM HG: CPT | Performed by: INTERNAL MEDICINE

## 2025-04-22 PROCEDURE — 1123F ACP DISCUSS/DSCN MKR DOCD: CPT | Performed by: INTERNAL MEDICINE

## 2025-04-22 PROCEDURE — G8399 PT W/DXA RESULTS DOCUMENT: HCPCS | Performed by: INTERNAL MEDICINE

## 2025-04-22 PROCEDURE — 1090F PRES/ABSN URINE INCON ASSESS: CPT | Performed by: INTERNAL MEDICINE

## 2025-04-22 PROCEDURE — G2211 COMPLEX E/M VISIT ADD ON: HCPCS | Performed by: INTERNAL MEDICINE

## 2025-04-22 PROCEDURE — 1036F TOBACCO NON-USER: CPT | Performed by: INTERNAL MEDICINE

## 2025-04-22 PROCEDURE — 93010 ELECTROCARDIOGRAM REPORT: CPT | Performed by: INTERNAL MEDICINE

## 2025-04-22 PROCEDURE — 1126F AMNT PAIN NOTED NONE PRSNT: CPT | Performed by: INTERNAL MEDICINE

## 2025-04-22 PROCEDURE — 93005 ELECTROCARDIOGRAM TRACING: CPT | Performed by: INTERNAL MEDICINE

## 2025-04-22 PROCEDURE — G8427 DOCREV CUR MEDS BY ELIG CLIN: HCPCS | Performed by: INTERNAL MEDICINE

## 2025-04-22 PROCEDURE — 1159F MED LIST DOCD IN RCRD: CPT | Performed by: INTERNAL MEDICINE

## 2025-04-22 RX ORDER — OLMESARTAN MEDOXOMIL 20 MG/1
20 TABLET ORAL DAILY
Qty: 90 TABLET | Refills: 3 | Status: SHIPPED | OUTPATIENT
Start: 2025-04-22

## 2025-04-22 RX ORDER — ELECTROLYTES/DEXTROSE
1 SOLUTION, ORAL ORAL DAILY
COMMUNITY

## 2025-04-22 ASSESSMENT — PATIENT HEALTH QUESTIONNAIRE - PHQ9
3. TROUBLE FALLING OR STAYING ASLEEP: NOT AT ALL
SUM OF ALL RESPONSES TO PHQ QUESTIONS 1-9: 0
SUM OF ALL RESPONSES TO PHQ QUESTIONS 1-9: 0
8. MOVING OR SPEAKING SO SLOWLY THAT OTHER PEOPLE COULD HAVE NOTICED. OR THE OPPOSITE, BEING SO FIGETY OR RESTLESS THAT YOU HAVE BEEN MOVING AROUND A LOT MORE THAN USUAL: NOT AT ALL
9. THOUGHTS THAT YOU WOULD BE BETTER OFF DEAD, OR OF HURTING YOURSELF: NOT AT ALL
7. TROUBLE CONCENTRATING ON THINGS, SUCH AS READING THE NEWSPAPER OR WATCHING TELEVISION: NOT AT ALL
4. FEELING TIRED OR HAVING LITTLE ENERGY: NOT AT ALL
1. LITTLE INTEREST OR PLEASURE IN DOING THINGS: NOT AT ALL
2. FEELING DOWN, DEPRESSED OR HOPELESS: NOT AT ALL
10. IF YOU CHECKED OFF ANY PROBLEMS, HOW DIFFICULT HAVE THESE PROBLEMS MADE IT FOR YOU TO DO YOUR WORK, TAKE CARE OF THINGS AT HOME, OR GET ALONG WITH OTHER PEOPLE: NOT DIFFICULT AT ALL
SUM OF ALL RESPONSES TO PHQ QUESTIONS 1-9: 0
SUM OF ALL RESPONSES TO PHQ QUESTIONS 1-9: 0
6. FEELING BAD ABOUT YOURSELF - OR THAT YOU ARE A FAILURE OR HAVE LET YOURSELF OR YOUR FAMILY DOWN: NOT AT ALL
5. POOR APPETITE OR OVEREATING: NOT AT ALL

## 2025-04-22 NOTE — PROGRESS NOTES
1. Have you been to the ER, urgent care clinic since your last visit?  Hospitalized since your last visit?No    2. Have you seen or consulted any other health care providers outside of the [facility] since your last visit?  Include any pap smears or colon screening. No    
NEEDED    calcium carbonate (OYSTER SHELL CALCIUM 500 MG) 1250 (500 Ca) MG tablet Take by mouth daily    furosemide (LASIX) 20 MG tablet Take by mouth daily as needed    gabapentin (NEURONTIN) 100 MG capsule Take by mouth 3 times daily.    polyethylene glycol (GLYCOLAX) 17 GM/SCOOP powder Take by mouth as needed    sertraline (ZOLOFT) 25 MG tablet Take by mouth daily     No current facility-administered medications for this visit.           Review of Symptoms:  11 systems reviewed, negative other than as stated in the HPI    Physical ExamPhysical Exam:    Vitals:    04/22/25 0946   BP: 120/70   Pulse: 53   Resp: 16   SpO2: 98%   Weight: 83 kg (183 lb)   Height: 1.6 m (5' 3\")     Body mass index is 32.42 kg/m².    General PE   General:  Well developed, in no acute distress, cooperative and alert  HEENT: No carotid bruits, no JVD, trach is midline. Neck Supple, PEERL, EOM intact.  Heart:  reg rate and rhythm; normal S1/S2; no murmurs, gallops or rubs.   Respiratory: Clear bilaterally x 4, no wheezing or rales  Abdomen:   Soft, non-tender, no distention, no masses. + BS.   Extremities:  Normal cap refill, no cyanosis, atraumatic.  No edema.  Neuro: A&Ox3, speech clear, gait stable.   Skin: Skin color is normal. No rashes or lesions. Non diaphoretic  Vascular: 2+ pulses symmetric in all extremities    Labs:   CBC   Lab Results   Component Value Date    WBC 5.8 10/15/2024    HGB 12.3 10/15/2024    HCT 37.1 10/15/2024    MCV 98.9 10/15/2024     10/15/2024       CMP  Lab Results   Component Value Date     (L) 10/15/2024    K 4.6 10/15/2024     10/15/2024    CO2 25 10/15/2024    BUN 26 (H) 10/15/2024    CREATININE 1.03 (H) 10/15/2024    GLUCOSE 96 10/15/2024    CALCIUM 9.9 10/15/2024    BILITOT 0.4 03/07/2023    ALKPHOS 104 03/07/2023    AST 17 03/07/2023    ALT 8 03/07/2023    LABGLOM 54 (L) 10/15/2024    GFRAA 85 04/14/2020    AGRATIO 2.0 03/07/2023    GLOB 3.4 10/20/2022       INR  Lab Results

## 2025-05-02 RX ORDER — SPIRONOLACTONE 25 MG/1
25 TABLET ORAL DAILY
Qty: 90 TABLET | Refills: 0 | Status: SHIPPED | OUTPATIENT
Start: 2025-05-02

## 2025-05-02 NOTE — TELEPHONE ENCOUNTER
Refill Request Received for the Following Medication     Requested Prescriptions     Pending Prescriptions Disp Refills    spironolactone (ALDACTONE) 25 MG tablet [Pharmacy Med Name: SPIRONOLACTONE 25 MG TABLET] 90 tablet 1     Sig: TAKE 1 TABLET BY MOUTH EVERY DAY     Last Prescribed:12-    Last Appointment With Me:  4/22/2025     Future Appointments:  Future Appointments   Date Time Provider Department Center   10/21/2025 11:00 AM Christin Rush MD [facility]   4/21/2026  2:00 PM Brandon Huerta MD [facility]

## 2025-07-29 ENCOUNTER — TRANSCRIBE ORDERS (OUTPATIENT)
Facility: HOSPITAL | Age: 85
End: 2025-07-29

## 2025-07-29 DIAGNOSIS — Z13.820 OSTEOPOROSIS SCREENING: ICD-10-CM

## 2025-07-29 DIAGNOSIS — Z78.0 POSTMENOPAUSAL STATUS: Primary | ICD-10-CM

## 2025-08-15 ENCOUNTER — HOSPITAL ENCOUNTER (OUTPATIENT)
Facility: HOSPITAL | Age: 85
Discharge: HOME | End: 2025-08-18
Attending: FAMILY MEDICINE
Payer: MEDICARE

## 2025-08-15 DIAGNOSIS — Z78.0 POSTMENOPAUSAL STATUS: ICD-10-CM

## 2025-08-15 DIAGNOSIS — Z13.820 OSTEOPOROSIS SCREENING: ICD-10-CM

## 2025-08-15 PROCEDURE — 77080 DXA BONE DENSITY AXIAL: CPT

## (undated) DEVICE — FORCEPS BX L160CM DIA8MM GRSP DISECT CUP TIP NONLOCKING ROT

## (undated) DEVICE — COPILOT BLEEDBACK CONTROL VALVE: Brand: COPILOT

## (undated) DEVICE — BAG SPEC BIOHZRD 10 X 10 IN --

## (undated) DEVICE — 3M™ TEGADERM™ TRANSPARENT FILM DRESSING FRAME STYLE, 1626W, 4 IN X 4-3/4 IN (10 CM X 12 CM), 50/CT 4CT/CASE: Brand: 3M™ TEGADERM™

## (undated) DEVICE — GUIDEWIRE VASC L260CM DIA0.035IN TIP L3MM STD EXCHG PTFE J

## (undated) DEVICE — TR BAND RADIAL ARTERY COMPRESSION DEVICE: Brand: TR BAND

## (undated) DEVICE — CATH GUID COR EB35 6FR 100CM -- LAUNCHER

## (undated) DEVICE — KENDALL RADIOLUCENT FOAM MONITORING ELECTRODE RECTANGULAR SHAPE: Brand: KENDALL

## (undated) DEVICE — Device

## (undated) DEVICE — BASIN EMSIS 16OZ GRAPHITE PLAS KID SHP MOLD GRAD FOR ORAL

## (undated) DEVICE — SYR 10ML LUER LOK 1/5ML GRAD --

## (undated) DEVICE — KIT MFLD ISOLATN NACL CNTRST PRT TBNG SPIK W/ PRSS TRNSDUC

## (undated) DEVICE — GLIDESHEATH SLENDER ACCESS KIT: Brand: GLIDESHEATH SLENDER

## (undated) DEVICE — TUBING PRSS MON L6IN PVC M FEM CONN

## (undated) DEVICE — PROVE COVER: Brand: UNBRANDED

## (undated) DEVICE — WASTE KIT - ST MARY: Brand: MEDLINE INDUSTRIES, INC.

## (undated) DEVICE — TOWEL 4 PLY TISS 19X30 SUE WHT

## (undated) DEVICE — 1200 GUARD II KIT W/5MM TUBE W/O VAC TUBE: Brand: GUARDIAN

## (undated) DEVICE — Z DISCONTINUED PER MEDLINE LINE GAS SAMPLING O2/CO2 LNG AD 13 FT NSL W/ TBNG FILTERLINE

## (undated) DEVICE — VALVE ANGIO ID0.11IN HEMSTAT MTL GUID WIRE INSRT TOOL AND

## (undated) DEVICE — SYR 3ML LL TIP 1/10ML GRAD --

## (undated) DEVICE — NEEDLE HYPO 18GA L1.5IN PNK S STL HUB POLYPR SHLD REG BVL

## (undated) DEVICE — KIT MED IMAG CNTRST AGNT W/ IOPAMIDOL REUSE

## (undated) DEVICE — KIT HND CTRL 3 W STPCOCK ROT END 54IN PREM HI PRSS TBNG AT

## (undated) DEVICE — SPECIAL PROCEDURE DRAPE 32" X 34": Brand: SPECIAL PROCEDURE DRAPE

## (undated) DEVICE — SOLIDIFIER MEDC 1200ML -- CONVERT TO 356117

## (undated) DEVICE — CATH 5F 100CM JL35 -- DXTERITY

## (undated) DEVICE — SET ADMIN 16ML TBNG L100IN 2 Y INJ SITE IV PIGGY BK DISP

## (undated) DEVICE — NEONATAL-ADULT SPO2 SENSOR: Brand: NELLCOR

## (undated) DEVICE — PACK PROCEDURE SURG HRT CATH

## (undated) DEVICE — CATH 5F 100CM JR40 -- DXTERITY

## (undated) DEVICE — HI-TORQUE VERSACORE MODIFIED J GUIDE WIRE SYSTEM 260 CM: Brand: HI-TORQUE VERSACORE

## (undated) DEVICE — MEDI-VAC YANK SUCT HNDL W/TPRD BULBOUS TIP: Brand: CARDINAL HEALTH

## (undated) DEVICE — BLOCK BITE ENDOSCP AD 21 MM W/ DIL BLU LF DISP

## (undated) DEVICE — CONTAINER SPEC 20 ML LID NEUT BUFF FORMALIN 10 % POLYPR STS

## (undated) DEVICE — ANGIOGRAPHY KIT

## (undated) DEVICE — CATH IV AUTOGRD BC PNK 20GA 25 -- INSYTE

## (undated) DEVICE — Device: Brand: OMNIWIRE PRESSURE GUIDE WIRE